# Patient Record
Sex: FEMALE | Race: WHITE | Employment: OTHER | URBAN - NONMETROPOLITAN AREA
[De-identification: names, ages, dates, MRNs, and addresses within clinical notes are randomized per-mention and may not be internally consistent; named-entity substitution may affect disease eponyms.]

---

## 2018-03-23 ENCOUNTER — HOSPITAL ENCOUNTER (OUTPATIENT)
Dept: NON INVASIVE DIAGNOSTICS | Age: 78
Discharge: HOME | End: 2018-03-23
Payer: MEDICARE

## 2018-03-23 VITALS — WEIGHT: 235.89 LBS | SYSTOLIC BLOOD PRESSURE: 130 MMHG | DIASTOLIC BLOOD PRESSURE: 69 MMHG | HEART RATE: 83 BPM

## 2018-03-23 PROCEDURE — C8928 TTE W OR W/O FOL W/CON,STRES: HCPCS

## 2018-03-23 PROCEDURE — 2580000003 HC RX 258: Performed by: INTERNAL MEDICINE

## 2018-03-23 PROCEDURE — 6360000004 HC RX CONTRAST MEDICATION: Performed by: INTERNAL MEDICINE

## 2018-03-23 PROCEDURE — 6360000002 HC RX W HCPCS: Performed by: INTERNAL MEDICINE

## 2018-03-23 RX ORDER — DOBUTAMINE HYDROCHLORIDE 200 MG/100ML
10 INJECTION INTRAVENOUS CONTINUOUS PRN
Status: DISCONTINUED | OUTPATIENT
Start: 2018-03-23 | End: 2018-03-23 | Stop reason: ALTCHOICE

## 2018-03-23 RX ORDER — SODIUM CHLORIDE 9 MG/ML
INJECTION, SOLUTION INTRAVENOUS
Status: COMPLETED | OUTPATIENT
Start: 2018-03-23 | End: 2018-03-23

## 2018-03-23 RX ADMIN — SODIUM CHLORIDE: 9 INJECTION, SOLUTION INTRAVENOUS at 10:25

## 2018-03-23 RX ADMIN — DOBUTAMINE HYDROCHLORIDE 10 MCG/KG/MIN: 200 INJECTION INTRAVENOUS at 10:25

## 2018-03-23 RX ADMIN — PERFLUTREN 1 ML: 6.52 INJECTION, SUSPENSION INTRAVENOUS at 10:20

## 2018-03-23 RX ADMIN — PERFLUTREN 0.5 ML: 6.52 INJECTION, SUSPENSION INTRAVENOUS at 10:29

## 2019-01-10 LAB
ALBUMIN SERPL-MCNC: 4 G/DL (ref 3.5–5.2)
ALP BLD-CCNC: 76 U/L (ref 35–104)
ALT SERPL-CCNC: 14 U/L (ref 5–33)
ANION GAP SERPL CALCULATED.3IONS-SCNC: 14 MMOL/L (ref 7–19)
AST SERPL-CCNC: 14 U/L (ref 5–32)
BILIRUB SERPL-MCNC: 0.3 MG/DL (ref 0.2–1.2)
BUN BLDV-MCNC: 22 MG/DL (ref 8–23)
CALCIUM SERPL-MCNC: 10.1 MG/DL (ref 8.8–10.2)
CHLORIDE BLD-SCNC: 104 MMOL/L (ref 98–111)
CO2: 29 MMOL/L (ref 22–29)
CREAT SERPL-MCNC: 2.7 MG/DL (ref 0.5–0.9)
GFR NON-AFRICAN AMERICAN: 17
GLUCOSE BLD-MCNC: 100 MG/DL (ref 74–109)
POTASSIUM SERPL-SCNC: 4.9 MMOL/L (ref 3.5–5)
SODIUM BLD-SCNC: 147 MMOL/L (ref 136–145)
TOTAL PROTEIN: 6.9 G/DL (ref 6.6–8.7)

## 2019-01-23 ENCOUNTER — NURSE TRIAGE (OUTPATIENT)
Dept: CALL CENTER | Facility: HOSPITAL | Age: 79
End: 2019-01-23

## 2019-01-23 LAB
ANION GAP SERPL CALCULATED.3IONS-SCNC: 12 MMOL/L (ref 7–19)
BUN BLDV-MCNC: 28 MG/DL (ref 8–23)
CALCIUM SERPL-MCNC: 10.2 MG/DL (ref 8.8–10.2)
CHLORIDE BLD-SCNC: 103 MMOL/L (ref 98–111)
CO2: 30 MMOL/L (ref 22–29)
CREAT SERPL-MCNC: 2 MG/DL (ref 0.5–0.9)
GFR NON-AFRICAN AMERICAN: 24
GLUCOSE BLD-MCNC: 77 MG/DL (ref 74–109)
POTASSIUM SERPL-SCNC: 4.9 MMOL/L (ref 3.5–5)
SODIUM BLD-SCNC: 145 MMOL/L (ref 136–145)

## 2019-01-23 NOTE — TELEPHONE ENCOUNTER
"Labs drawn at [facility] this am. She hit her head on the  singh of the van while getting walker out. This happen about 09:30am. A knot appeared and they put a cold coke can on it.  They have been out all day, shopping and running errands. The knot has resolved with now.  No c/o headache.  Area now with small indention. She is acting perfectly normally working a word search puzzle. No n/v.    Reason for Disposition  • Scalp swelling, bruise or pain    Additional Information  • Negative: [1] ACUTE NEURO SYMPTOM AND [2] present now  (DEFINITION: difficult to awaken OR confused thinking and talking OR slurred speech OR weakness of arms OR unsteady walking)  • Negative: Knocked out (unconscious) > 1 minute  • Negative: Seizure (convulsion) occurred  (Exception: prior history of seizures and now alert and without Acute Neuro Symptoms)  • Negative: Penetrating head injury (e.g., knife, gun shot wound, metal object)  • Negative: [1] Major bleeding (e.g., actively dripping or spurting) AND [2] can't be stopped  • Negative: [1] Dangerous mechanism of injury (e.g., MVA, diving, trampoline, contact sports, fall > 10 feet or 3 meters) AND [2] NECK pain AND [3] began < 1 hour after injury  • Negative: Sounds like a life-threatening emergency to the triager  • Negative: [1] Recently examined and diagnosed with a concussion by a healthcare provider AND [2] questions about concussion symptoms  • Negative: Can't remember what happened (amnesia)  • Negative: Vomiting once or more  • Negative: [1] Loss of vision or double vision AND [2] present now  • Negative: Watery or blood-tinged fluid dripping from the NOSE or EARS now  (Exception: tears from crying or nosebleed from nasal trauma)  • Negative: One or two \"black eyes\" (bruising, purple color of eyelids)  • Negative: [1] Large swelling AND [2] size > palm of person's hand  • Negative: Skin is split open or gaping  (or length > 1/2 inch or 12 mm)  • Negative: [1] Bleeding AND [2] won't " "stop after 10 minutes of direct pressure (using correct technique)  • Negative: Sounds like a serious injury to the triager  • Negative: [1] ACUTE NEURO SYMPTOM AND [2] now fine  (DEFINITION: difficult to awaken OR confused thinking and talking OR slurred speech OR weakness of arms OR unsteady walking)  • Negative: [1] Knocked out (unconscious) < 1 minute AND [2] now fine  • Negative: [1] SEVERE headache AND [2]  not improved 2 hours after pain medicine/ice packs  • Negative: Dangerous injury (e.g., MVA, diving, trampoline, contact sports, fall > 10 feet or 3 meters) or severe blow from hard object (e.g., golf club or baseball bat)  • Negative: Taking Coumadin (warfarin) or other strong blood thinner, or known bleeding disorder (e.g., thrombocytopenia)  • Negative: Suspicious history for the injury  • Negative: Patient is confused or is an unreliable provider of information (e.g., dementia, profound mental retardation, alcohol intoxication)  • Negative: [1] Last tetanus shot > 5 years ago AND [2] DIRTY cut or scrape  • Negative: [1] After 72 hours AND [2] headache persists    Answer Assessment - Initial Assessment Questions  1. MECHANISM: \"How did the injury happen?\" For falls, ask: \"What height did you fall from?\" and \"What surface did you fall against?\"       Van singh lowered on forehead while getting walker out of the back of the van  2. ONSET: \"When did the injury happen?\" (Minutes or hours ago)       About 0930am  3. NEUROLOGIC SYMPTOMS: \"Was there any loss of consciousness?\" \"Are there any other neurological symptoms?\"      no symptomns  4. MENTAL STATUS: \"Does the person know who he is, who you are, and where he is?\"       A/o  5. LOCATION: \"What part of the head was hit?\"       Right at the forehead  6. SCALP APPEARANCE: \"What does the scalp look like? Is it bleeding now?\" If so, ask: \"Is it difficult to stop?\"       Did not break skin   7. SIZE: For cuts, bruises, or swelling, ask: \"How large is it?\" " "(e.g., inches or centimeters)      Swelling has resolved  8. PAIN: \"Is there any pain?\" If so, ask: \"How bad is it?\"  (e.g., Scale 1-10; or mild, moderate, severe)      Denies headache  9. TETANUS: For any breaks in the skin, ask: \"When was the last tetanus booster?\"        10. OTHER SYMPTOMS: \"Do you have any other symptoms?\" (e.g., neck pain, vomiting)        none  11. PREGNANCY: \"Is there any chance you are pregnant?\" \"When was your last menstrual period?\"       na    Protocols used: HEAD INJURY-ADULT-AH      "

## 2019-05-12 ENCOUNTER — APPOINTMENT (OUTPATIENT)
Dept: GENERAL RADIOLOGY | Facility: HOSPITAL | Age: 79
End: 2019-05-12

## 2019-05-12 ENCOUNTER — APPOINTMENT (OUTPATIENT)
Dept: CT IMAGING | Facility: HOSPITAL | Age: 79
End: 2019-05-12

## 2019-05-12 ENCOUNTER — HOSPITAL ENCOUNTER (EMERGENCY)
Facility: HOSPITAL | Age: 79
Discharge: HOME | End: 2019-05-12
Admitting: EMERGENCY MEDICINE

## 2019-05-12 VITALS
DIASTOLIC BLOOD PRESSURE: 38 MMHG | RESPIRATION RATE: 17 BRPM | OXYGEN SATURATION: 94 % | SYSTOLIC BLOOD PRESSURE: 122 MMHG | BODY MASS INDEX: 37.77 KG/M2 | TEMPERATURE: 98.8 F | WEIGHT: 235 LBS | HEIGHT: 66 IN | HEART RATE: 90 BPM

## 2019-05-12 DIAGNOSIS — S42.292A CLOSED FRACTURE OF HEAD OF LEFT HUMERUS, INITIAL ENCOUNTER: Primary | ICD-10-CM

## 2019-05-12 DIAGNOSIS — K11.8 MASS OF PAROTID GLAND: ICD-10-CM

## 2019-05-12 DIAGNOSIS — N18.9 CHRONIC KIDNEY DISEASE, UNSPECIFIED CKD STAGE: ICD-10-CM
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
## 2019-05-12 DIAGNOSIS — S09.90XA INJURY OF HEAD, INITIAL ENCOUNTER: ICD-10-CM

## 2019-05-12 LAB
ALBUMIN SERPL-MCNC: 3.7 G/DL (ref 3.5–5)
ALBUMIN/GLOB SERPL: 1.3 G/DL (ref 1.1–2.5)
ALP SERPL-CCNC: 89 U/L (ref 24–120)
ALT SERPL W P-5'-P-CCNC: 21 U/L (ref 0–54)
ANION GAP SERPL CALCULATED.3IONS-SCNC: 11 MMOL/L (ref 4–13)
APTT PPP: 22 SECONDS (ref 24.1–35)
AST SERPL-CCNC: 18 U/L (ref 7–45)
BASOPHILS # BLD AUTO: 0.03 10*3/MM3 (ref 0–0.2)
BASOPHILS NFR BLD AUTO: 0.2 % (ref 0–2)
BILIRUB SERPL-MCNC: 0.5 MG/DL (ref 0.1–1)
BUN BLD-MCNC: 27 MG/DL (ref 5–21)
BUN/CREAT SERPL: 13.5 (ref 7–25)
CALCIUM SPEC-SCNC: 9.5 MG/DL (ref 8.4–10.4)
CHLORIDE SERPL-SCNC: 103 MMOL/L (ref 98–110)
CO2 SERPL-SCNC: 26 MMOL/L (ref 24–31)
CREAT BLD-MCNC: 2 MG/DL (ref 0.5–1.4)
DEPRECATED RDW RBC AUTO: 44.8 FL (ref 40–54)
EOSINOPHIL # BLD AUTO: 0.14 10*3/MM3 (ref 0–0.7)
EOSINOPHIL NFR BLD AUTO: 1.1 % (ref 0–4)
ERYTHROCYTE [DISTWIDTH] IN BLOOD BY AUTOMATED COUNT: 13.1 % (ref 12–15)
GFR SERPL CREATININE-BSD FRML MDRD: 24 ML/MIN/1.73
GLOBULIN UR ELPH-MCNC: 2.9 GM/DL
GLUCOSE BLD-MCNC: 320 MG/DL (ref 70–100)
HCT VFR BLD AUTO: 34.8 % (ref 37–47)
HGB BLD-MCNC: 11.6 G/DL (ref 12–16)
IMM GRANULOCYTES # BLD AUTO: 0.05 10*3/MM3 (ref 0–0.05)
IMM GRANULOCYTES NFR BLD AUTO: 0.4 % (ref 0–5)
INR PPP: 1 (ref 0.91–1.09)
LYMPHOCYTES # BLD AUTO: 1.92 10*3/MM3 (ref 0.72–4.86)
LYMPHOCYTES NFR BLD AUTO: 15 % (ref 15–45)
MCH RBC QN AUTO: 31.3 PG (ref 28–32)
MCHC RBC AUTO-ENTMCNC: 33.3 G/DL (ref 33–36)
MCV RBC AUTO: 93.8 FL (ref 82–98)
MONOCYTES # BLD AUTO: 0.71 10*3/MM3 (ref 0.19–1.3)
MONOCYTES NFR BLD AUTO: 5.5 % (ref 4–12)
NEUTROPHILS # BLD AUTO: 9.96 10*3/MM3 (ref 1.87–8.4)
NEUTROPHILS NFR BLD AUTO: 77.8 % (ref 39–78)
NRBC BLD AUTO-RTO: 0 /100 WBC (ref 0–0.2)
PLATELET # BLD AUTO: 322 10*3/MM3 (ref 130–400)
PMV BLD AUTO: 9.7 FL (ref 6–12)
POTASSIUM BLD-SCNC: 4.1 MMOL/L (ref 3.5–5.3)
PROT SERPL-MCNC: 6.6 G/DL (ref 6.3–8.7)
PROTHROMBIN TIME: 13.5 SECONDS (ref 11.9–14.6)
RBC # BLD AUTO: 3.71 10*6/MM3 (ref 4.2–5.4)
SODIUM BLD-SCNC: 140 MMOL/L (ref 135–145)
WBC NRBC COR # BLD: 12.81 10*3/MM3 (ref 4.8–10.8)

## 2019-05-12 PROCEDURE — 25010000002 MORPHINE SULFATE (PF) 2 MG/ML SOLUTION: Performed by: NURSE PRACTITIONER

## 2019-05-12 PROCEDURE — 25010000002 FENTANYL CITRATE (PF) 100 MCG/2ML SOLUTION: Performed by: NURSE PRACTITIONER

## 2019-05-12 PROCEDURE — 70486 CT MAXILLOFACIAL W/O DYE: CPT

## 2019-05-12 PROCEDURE — 70450 CT HEAD/BRAIN W/O DYE: CPT

## 2019-05-12 PROCEDURE — 85730 THROMBOPLASTIN TIME PARTIAL: CPT | Performed by: NURSE PRACTITIONER

## 2019-05-12 PROCEDURE — 25010000002 ONDANSETRON PER 1 MG: Performed by: NURSE PRACTITIONER

## 2019-05-12 PROCEDURE — 85610 PROTHROMBIN TIME: CPT | Performed by: NURSE PRACTITIONER

## 2019-05-12 PROCEDURE — 99285 EMERGENCY DEPT VISIT HI MDM: CPT

## 2019-05-12 PROCEDURE — 85025 COMPLETE CBC W/AUTO DIFF WBC: CPT | Performed by: NURSE PRACTITIONER

## 2019-05-12 PROCEDURE — 80053 COMPREHEN METABOLIC PANEL: CPT | Performed by: NURSE PRACTITIONER

## 2019-05-12 PROCEDURE — 96376 TX/PRO/DX INJ SAME DRUG ADON: CPT

## 2019-05-12 PROCEDURE — 96375 TX/PRO/DX INJ NEW DRUG ADDON: CPT

## 2019-05-12 PROCEDURE — 96374 THER/PROPH/DIAG INJ IV PUSH: CPT

## 2019-05-12 PROCEDURE — 72125 CT NECK SPINE W/O DYE: CPT

## 2019-05-12 PROCEDURE — 73030 X-RAY EXAM OF SHOULDER: CPT

## 2019-05-12 RX ORDER — ONDANSETRON 2 MG/ML
4 INJECTION INTRAMUSCULAR; INTRAVENOUS ONCE
Status: COMPLETED | OUTPATIENT
Start: 2019-05-12 | End: 2019-05-12

## 2019-05-12 RX ORDER — MORPHINE SULFATE 2 MG/ML
2 INJECTION, SOLUTION INTRAMUSCULAR; INTRAVENOUS ONCE
Status: DISCONTINUED | OUTPATIENT
Start: 2019-05-12 | End: 2019-05-12

## 2019-05-12 RX ORDER — MORPHINE SULFATE 2 MG/ML
2 INJECTION, SOLUTION INTRAMUSCULAR; INTRAVENOUS ONCE
Status: COMPLETED | OUTPATIENT
Start: 2019-05-12 | End: 2019-05-12

## 2019-05-12 RX ORDER — FENTANYL CITRATE 50 UG/ML
50 INJECTION, SOLUTION INTRAMUSCULAR; INTRAVENOUS ONCE
Status: COMPLETED | OUTPATIENT
Start: 2019-05-12 | End: 2019-05-12

## 2019-05-12 RX ORDER — HYDROCODONE BITARTRATE AND ACETAMINOPHEN 5; 325 MG/1; MG/1
1 TABLET ORAL EVERY 6 HOURS PRN
Qty: 12 TABLET | Refills: 0 | Status: SHIPPED | OUTPATIENT
Start: 2019-05-12 | End: 2019-05-15

## 2019-05-12 RX ADMIN — FENTANYL CITRATE 50 MCG: 50 INJECTION, SOLUTION INTRAMUSCULAR; INTRAVENOUS at 20:42

## 2019-05-12 RX ADMIN — ONDANSETRON HYDROCHLORIDE 4 MG: 2 SOLUTION INTRAMUSCULAR; INTRAVENOUS at 18:36

## 2019-05-12 RX ADMIN — ONDANSETRON HYDROCHLORIDE 4 MG: 2 SOLUTION INTRAMUSCULAR; INTRAVENOUS at 17:00

## 2019-05-12 RX ADMIN — MORPHINE SULFATE 2 MG: 2 INJECTION, SOLUTION INTRAMUSCULAR; INTRAVENOUS at 18:36

## 2019-05-12 RX ADMIN — MORPHINE SULFATE 2 MG: 2 INJECTION, SOLUTION INTRAMUSCULAR; INTRAVENOUS at 17:00

## 2019-05-12 NOTE — ED PROVIDER NOTES
Subjective   Patient is an 79-year-old  female who presents to the ER today per EMS with complaint of fall.  Patient states she was walking into her home when she tripped over a small ledge walking into her trailer.  She states that she fell face first.  The patient states she had the left side of her face on the hard floor of her house.  The patient states she also landed on her left shoulder.  Patient reports she is having facial pain and had pain at this time.  She denies any loss of consciousness.  Patient also reports left shoulder pain.  She states it is difficult for her to raise her shoulder due to the pain.  The patient denies any back pain.  She denies any neck pain at this time.  She denies any chest pain or shortness of breath.  She denies any hip or extremity pain.  She presents here today for further evaluation.        History provided by:  Patient   used: No    Fall   Mechanism of injury: fall    Injury location:  Head/neck, shoulder/arm and face  Head/neck injury location:  Head  Facial injury location:  Face  Shoulder/arm injury location:  L shoulder  Incident location:  Home  Time since incident:  1 hour  Arrived directly from scene: yes    Fall:     Fall occurred:  Standing    Impact surface:  Hard floor    Point of impact:  Head and face    Entrapped after fall: no    Suspicion of alcohol use: no    Suspicion of drug use: no    Associated symptoms: no abdominal pain, no back pain, no blindness, no chest pain, no difficulty breathing, no headaches, no hearing loss, no loss of consciousness, no nausea, no neck pain, no seizures and no vomiting    Risk factors: diabetes and kidney disease    Risk factors: no AICD, no anticoagulation therapy, no asthma, no beta blocker therapy, no CABG, no CAD, no CHF, no COPD, no dialysis, no hemophilia, no pacemaker, no past MI, not pregnant and no steroid use        Review of Systems   HENT: Negative for hearing loss.    Eyes: Negative  for blindness.   Cardiovascular: Negative for chest pain.   Gastrointestinal: Negative for abdominal pain, nausea and vomiting.   Musculoskeletal: Negative for back pain and neck pain.   Neurological: Negative for seizures, loss of consciousness and headaches.   All other systems reviewed and are negative.      No past medical history on file.    No Known Allergies    No past surgical history on file.    No family history on file.    Social History     Socioeconomic History   • Marital status:      Spouse name: Not on file   • Number of children: Not on file   • Years of education: Not on file   • Highest education level: Not on file           Objective   Physical Exam   Constitutional: She is oriented to person, place, and time. She appears well-developed and well-nourished.   HENT:   Head: Normocephalic and atraumatic.       Right Ear: External ear normal.   Left Ear: External ear normal.   Nose: Nose normal.   Mouth/Throat: Oropharynx is clear and moist.   Eyes: Conjunctivae are normal. Pupils are equal, round, and reactive to light.   Neck: Normal range of motion.       Cardiovascular: Normal rate, regular rhythm and normal heart sounds.   Pulmonary/Chest: Effort normal and breath sounds normal.   Abdominal: Soft. Bowel sounds are normal.   Musculoskeletal:        Arms:  Neurological: She is alert and oriented to person, place, and time.   Skin: Skin is warm and dry. Capillary refill takes less than 2 seconds.   Psychiatric: She has a normal mood and affect.   Nursing note and vitals reviewed.      Procedures           ED Course  ED Course as of May 12 2242   Sun May 12, 2019   1652 Xray lt shoulder reviewed; arm sling will be placed.   [LF]   1659 Pt and family updated on Xray lt shoulder. Pending labs and CT results at this time.     [LF]   2002 There was also a layering density in the left maxillary sinus, correlate for sinusitis, occult fracture also considered.  Advised the patient of this and  advised her to follow-up with ENT for this as well.  [LF]   2002 X-ray of the left shoulder showed a comminuted fracture of the head and neck of the left humerus.  The patient was placed in arm sling for this.  I have contacted orthopedics to discuss this. I did discuss the pt case with Dr. Bonilla; he would like a shoulder axillary and lateral view of the lt shoulder; if Xray shows no displacement of glenohumeral head, pt may be DC home to call and f/u with him this week or next week. Pt advised to use arm sling and to keep this on loosely; he has advised that this is a non-operable injury. Pt and family advised of this.  [LF]   2002 CT scan of the head showed no acute findings.  CT scan of the cervical spine showed no acute findings.  [LF]   2002 CT scan of the facial bones did show a left parotid mass that will need further evaluation.  Advised patient she needs to follow-up ENT for this.  [LF]   2002 There was also a layering density in the left maxillary sinus.  Findings could be due to sinusitis vs occult fracture. I have advised the pt that she will need to f/u with ENT for this as well and will refer her to Dr. Bobby.   [LF]   2218 Repeat shoulder Xray with axillary and lateral views shows alignment of glenohumeral head.   [LF]   2236 [facility] report # [id number] reviewed; no suspicious activity noted. The pt O2 sat has remained 93-94% on Ra. The pt has an arm sling placed and will be DC home in stable cond. Pt advised to call and f/u with Dr. Bonilla tomorrow; she will need to f/u either this week or next week; call tomorrow for an appt. Pt will be DC home with her daughter who will help care for her mother.   [LF]   2237 I will DC the patient home with a short course of Norco for pain.  The patient is advised to stop precautions while using.  Patient is advised to return to the ER for any new or worsening symptoms patient discharged home in stable condition at this time.  Patient denies any chest pain  shortness of breath back pain lower extremity pain or other injuries.  [LF]      ED Course User Index  [LF] Sada Ordonez, APRN      XR Shoulder 2+ View Left   Final Result   1. Limited exam.   2. Redemonstration of proximal humerus fracture with possibly 3 cm of   foreshortening, but this could be exaggerated due to technique.   3. Humeral head appears normally aligned with the glenoid fossa.   This report was finalized on 05/12/2019 21:44 by Dr Zulema Fernandez MD.      CT Head Without Contrast   Final Result   1. No acute intracranial findings.   2. Mild volume loss and mild chronic microvascular changes.   This report was finalized on 05/12/2019 19:44 by Dr Zulema Fernandez MD.      CT Facial Bones Without Contrast   Final Result   1. No evidence of acute facial fracture.   2. Layering density in the left maxillary sinus, correlate for   sinusitis. Occult fracture is also considered. No displaced maxillary   sinus wall fracture.   3. There is a 1.5 cm enhancing mass in the left parotid gland. Recommend   correlation with outside imaging to determine chronicity. Recommend ENT   follow-up and consider nonemergent/outpatient targeted ultrasound.           This report was finalized on 05/12/2019 19:44 by Dr Zulema Fernandez MD.      CT Cervical Spine Without Contrast   Final Result   1. No acute fracture or dislocation.   2. Degenerative changes with probable at least moderate spinal canal   stenosis at C3-4.   This report was finalized on 05/12/2019 19:44 by Dr Zulema Fernandez MD.      XR Shoulder 2+ View Left   Final Result   Impression:    1. Comminuted fracture head and neck of the left humerus..           This report was finalized on 05/12/2019 16:46 by Dr. Evan Tuttle MD.        Labs Reviewed   COMPREHENSIVE METABOLIC PANEL - Abnormal; Notable for the following components:       Result Value    Glucose 320 (*)     BUN 27 (*)     Creatinine 2.00 (*)     eGFR Non  Amer 24 (*)     All other components  within normal limits    Narrative:     GFR Normal >60  Chronic Kidney Disease <60  Kidney Failure <15   CBC WITH AUTO DIFFERENTIAL - Abnormal; Notable for the following components:    WBC 12.81 (*)     RBC 3.71 (*)     Hemoglobin 11.6 (*)     Hematocrit 34.8 (*)     Neutrophils, Absolute 9.96 (*)     All other components within normal limits   APTT - Abnormal; Notable for the following components:    PTT 22.0 (*)     All other components within normal limits   PROTIME-INR - Normal   CBC AND DIFFERENTIAL    Narrative:     The following orders were created for panel order CBC & Differential.  Procedure                               Abnormality         Status                     ---------                               -----------         ------                     CBC Auto Differential[[id number]]        Abnormal            Final result                 Please view results for these tests on the individual orders.                 MDM  Number of Diagnoses or Management Options  Chronic kidney disease, unspecified CKD stage: established and improving  Closed fracture of head of left humerus, initial encounter: new and requires workup  Fall, initial encounter: new and requires workup  Injury of head, initial encounter: new and requires workup  Mass of parotid gland: new and requires workup     Amount and/or Complexity of Data Reviewed  Clinical lab tests: reviewed and ordered  Tests in the radiology section of CPT®: reviewed and ordered  Discuss the patient with other providers: yes    Risk of Complications, Morbidity, and/or Mortality  General comments: Pt case discussed with Dr. Bautista who agrees with plan of care.     Patient Progress  Patient progress: stable        Final diagnoses:   Closed fracture of head of left humerus, initial encounter   Mass of parotid gland   Chronic kidney disease, unspecified CKD stage   Fall, initial encounter   Injury of head, initial encounter            Sada Ordonez, APRN  05/12/19  [id number]

## 2019-06-02 NOTE — PROGRESS NOTES
Jesse Duffy MD     Chief Complaint   Patient presents with   • Neck Mass        History of Present Illness   Gema Chapman is a  79 y.o.  female who presents for evaluation of an abnormal radiographic study. She had a recent CT  of the facial bones that showed a 1.5 cm enhancing lesion of the left parotid gland.. She has had complaints regarding this findings.  She has not had a palpable mass, facial nerve symptoms, dryness, or lymphadenopathy.  If the CT scan was not performed, she would not know that she had a lesion.    Review of Systems  Reviewed per patient intake note    Past History:  Past Medical History:   Diagnosis Date   • Diabetes (CMS/HCC)    • Hyperlipemia    • Hypertension    • Renal disease      Past Surgical History:   Procedure Laterality Date   • CHOLECYSTECTOMY     • HIP ARTHROPLASTY Left      Family History   Problem Relation Age of Onset   • Diabetes Mother      Social History     Tobacco Use   • Smoking status: Never Smoker   • Smokeless tobacco: Never Used   Substance Use Topics   • Alcohol use: No     Frequency: Never   • Drug use: Not on file     Outpatient Medications Marked as Taking for the 6/5/19 encounter (Office Visit) with Jesse Duffy MD   Medication Sig Dispense Refill   • acetaminophen (TYLENOL) 500 MG tablet Take 500 mg by mouth Every 6 (Six) Hours As Needed for Mild Pain .     • amLODIPine-benazepril (LOTREL 5-10) 5-10 MG per capsule      • calcitriol (ROCALTROL) 0.25 MCG capsule      • gabapentin (NEURONTIN) 100 MG capsule      • HYDROcodone-acetaminophen (NORCO) 5-325 MG per tablet      • LANTUS SOLOSTAR 100 UNIT/ML injection pen      • magnesium chloride ER 64 MG DR tablet Take  by mouth Daily.     • NOVOLOG FLEXPEN 100 UNIT/ML solution pen-injector sc pen      • Omega-3 Fatty Acids (FISH OIL) 1000 MG capsule capsule Take  by mouth Daily With Breakfast.     • omeprazole (priLOSEC) 20 MG capsule      • rosuvastatin (CRESTOR) 20 MG tablet      • Semaglutide  (OZEMPIC) 0.25 or 0.5 MG/DOSE solution pen-injector Inject 0.5 mg under the skin into the appropriate area as directed 1 (One) Time Per Week.     • torsemide (DEMADEX) 20 MG tablet      • ULORIC 40 MG tablet      • Vitamin D, Cholecalciferol, (CHOLECALCIFEROL) 400 units tablet Take 2,000 Units by mouth Daily.       Allergies:  Patient has no known allergies.        Vital Signs:   Temp:  [97.3 °F (36.3 °C)] 97.3 °F (36.3 °C)  Heart Rate:  [61] 61  BP: (131)/(88) 131/88    Physical Exam:   CONSTITUTIONAL: well nourished, well-developed, alert, oriented, in no acute distress   COMMUNICATION AND VOICE: able to communicate normally, normal voice quality  HEAD: normocephalic, no lesions, atraumatic, no tenderness, no masses   FACE: appearance normal, no lesions, no tenderness, no deformities, facial motion symmetric  SALIVARY GLANDS: On the left side, I may be able to palpate a fullness in the parotid gland but no discrete mass or lesion.  EYES: ocular motility normal, eyelids normal, orbits normal, no proptosis, conjunctiva normal , pupils equal, round  HEARING: response to conversational voice normal bilaterally   EXTERNAL EARS: auricles without lesions  EXTERNAL EAR CANALS: normal ear canals without stenosis or significant cerumen  TYMPANIC MEMBRANES: tympanic membrane appearance normal, no lesions, no perforation, normal mobility, no fluid  EXTERNAL NOSE: structure normal, no tenderness on palpation, no nasal discharge, no lesions, no evidence of trauma, nostrils patent  INTRANASAL EXAM: nasal mucosa normal, vestibule within normal limits, inferior turbinate normal, nasal septum without overtly obstructing anterior deviation  LIPS: structure normal, no tenderness on palpation, no lesions, no evidence of trauma  TEETH: dentition within normal limits for age  GUMS: gingivae healthy  ORAL MUCOSA: oral mucosa normal, no mucosal lesions  FLOOR OF MOUTH: Warthin's duct patent, mucosa normal  TONGUE: lingual mucosa normal  without lesions, normal tongue mobility  PALATE: soft and hard palates with normal mucosa and structure  OROPHARYNX: oropharyngeal mucosa normal, tonsil fossa with normal in appearance  NECK: neck appearance normal, no masses or tenderness  THYROID: no overt thyromegaly, no tenderness, nodules or mass present on palpation, position midline   LYMPH NODES: no lymphadenopathy  CHEST/RESPIRATORY: respiratory effort normal  CARDIOVASCULAR: extremities without cyanosis or edema, no overt jugulovenous distension present  NEUROLOGIC/PSYCHIATRIC: oriented appropriately for age, mood normal, affect appropriate, cranial nerves intact grossly unless specifically mentioned above     RESULTS REVIEW:    CT FACE:  I have personally reviewed the patient's ct scan of the facial bones images.       Assessment   1. 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  After the long discussion, the patient wanted to observe this area and will consider needle biopsy on further discussion with her family members.    Orders Placed This Encounter   Procedures   • US Head Neck Soft Tissue       Return in about 4 months (around 10/5/2019).    Jesse Duffy MD  06/05/19  2:32 PM

## 2019-06-05 ENCOUNTER — OFFICE VISIT (OUTPATIENT)
Dept: OTOLARYNGOLOGY | Facility: CLINIC | Age: 79
End: 2019-06-05

## 2019-06-05 VITALS
WEIGHT: 212 LBS | BODY MASS INDEX: 34.07 KG/M2 | HEIGHT: 66 IN | HEART RATE: 61 BPM | SYSTOLIC BLOOD PRESSURE: 131 MMHG | TEMPERATURE: 97.3 F | DIASTOLIC BLOOD PRESSURE: 88 MMHG

## 2019-06-05 DIAGNOSIS — D37.030 NEOPLASM OF UNCERTAIN BEHAVIOR OF PAROTID GLAND: Primary | ICD-10-CM

## 2019-06-05 PROCEDURE — 99203 OFFICE O/P NEW LOW 30 MIN: CPT | Performed by: OTOLARYNGOLOGY

## 2019-06-05 RX ORDER — HYDROCODONE BITARTRATE AND ACETAMINOPHEN 5; 325 MG/1; MG/1
TABLET ORAL
COMMUNITY
Start: 2019-05-17 | End: 2019-10-23

## 2019-06-05 RX ORDER — MULTIVIT-MIN/IRON/FOLIC ACID/K 18-600-40
2000 CAPSULE ORAL DAILY
COMMUNITY

## 2019-06-05 RX ORDER — INSULIN GLARGINE 100 [IU]/ML
61 INJECTION, SOLUTION SUBCUTANEOUS 2 TIMES DAILY
Status: ON HOLD | COMMUNITY
Start: 2019-05-31 | End: 2020-04-21 | Stop reason: SDUPTHER

## 2019-06-05 RX ORDER — AMLODIPINE BESYLATE AND BENAZEPRIL HYDROCHLORIDE 5; 10 MG/1; MG/1
1 CAPSULE ORAL DAILY
COMMUNITY
Start: 2019-04-18 | End: 2020-04-21 | Stop reason: HOSPADM

## 2019-06-05 RX ORDER — ROSUVASTATIN CALCIUM 20 MG/1
20 TABLET, COATED ORAL DAILY
COMMUNITY
Start: 2019-04-18

## 2019-06-05 RX ORDER — MAGNESIUM CHLORIDE 64 MG
64 TABLET, DELAYED RELEASE (ENTERIC COATED) ORAL DAILY
COMMUNITY

## 2019-06-05 RX ORDER — INSULIN ASPART 100 [IU]/ML
13 INJECTION, SOLUTION INTRAVENOUS; SUBCUTANEOUS
COMMUNITY
Start: 2019-05-31

## 2019-06-05 RX ORDER — GABAPENTIN 100 MG/1
100 CAPSULE ORAL DAILY
COMMUNITY
Start: 2019-04-18

## 2019-06-05 RX ORDER — FEBUXOSTAT 40 MG/1
TABLET ORAL
Status: ON HOLD | COMMUNITY
Start: 2019-05-20 | End: 2020-04-08

## 2019-06-05 RX ORDER — CALCITRIOL 0.25 UG/1
0.25 CAPSULE, LIQUID FILLED ORAL DAILY
COMMUNITY
Start: 2019-05-20

## 2019-06-05 RX ORDER — CHLORAL HYDRATE 500 MG
1000 CAPSULE ORAL
COMMUNITY

## 2019-06-05 RX ORDER — ACETAMINOPHEN 500 MG
1000 TABLET ORAL DAILY
COMMUNITY

## 2019-06-05 RX ORDER — OMEPRAZOLE 20 MG/1
CAPSULE, DELAYED RELEASE ORAL
COMMUNITY
Start: 2019-04-18 | End: 2019-10-23

## 2019-06-05 RX ORDER — TORSEMIDE 20 MG/1
20 TABLET ORAL TAKE AS DIRECTED
COMMUNITY
Start: 2019-03-05 | End: 2020-04-21 | Stop reason: HOSPADM

## 2019-06-05 NOTE — PROGRESS NOTES
Estella Osullivan   Patient Intake Note    Review of Systems  Review of Systems   Constitutional: Negative for chills, fatigue and fever.   HENT:        See HPI   Eyes: Negative for pain, redness and itching.   Respiratory: Negative for cough, choking and shortness of breath.    Gastrointestinal: Negative for diarrhea, nausea and vomiting.   Musculoskeletal: Negative for neck pain and neck stiffness.   Neurological: Negative for dizziness, light-headedness and headaches.   Psychiatric/Behavioral: Negative for sleep disturbance.   All other systems reviewed and are negative.      QUALITY MEASURES    Tobacco Use: Screening and Cessation Intervention  Social History    Tobacco Use      Smoking status: Never Smoker      Smokeless tobacco: Never Used        Estella Osullivan  6/5/2019  1:19 PM

## 2019-10-15 ENCOUNTER — HOSPITAL ENCOUNTER (OUTPATIENT)
Dept: ULTRASOUND IMAGING | Facility: HOSPITAL | Age: 79
Discharge: HOME | End: 2019-10-15
Admitting: OTOLARYNGOLOGY

## 2019-10-15 DIAGNOSIS — D37.030 NEOPLASM OF UNCERTAIN BEHAVIOR OF PAROTID GLAND: ICD-10-CM

## 2019-10-15 PROCEDURE — 76536 US EXAM OF HEAD AND NECK: CPT

## 2019-10-23 ENCOUNTER — OFFICE VISIT (OUTPATIENT)
Dept: OTOLARYNGOLOGY | Facility: CLINIC | Age: 79
End: 2019-10-23

## 2019-10-23 VITALS
WEIGHT: 210.4 LBS | HEIGHT: 66 IN | BODY MASS INDEX: 33.82 KG/M2 | SYSTOLIC BLOOD PRESSURE: 119 MMHG | TEMPERATURE: 98.7 F | DIASTOLIC BLOOD PRESSURE: 70 MMHG | HEART RATE: 110 BPM

## 2019-10-23 DIAGNOSIS — D37.030 NEOPLASM OF UNCERTAIN BEHAVIOR OF PAROTID GLAND: Primary | ICD-10-CM

## 2019-10-23 PROCEDURE — 99213 OFFICE O/P EST LOW 20 MIN: CPT | Performed by: OTOLARYNGOLOGY

## 2019-10-23 RX ORDER — FAMOTIDINE 20 MG/1
20 TABLET, FILM COATED ORAL DAILY
COMMUNITY

## 2019-10-23 NOTE — PROGRESS NOTES
Lauren Tamayo MA   Patient Intake Note    Review of Systems  Review of Systems   Constitutional: Negative for chills, fatigue and fever.   HENT:        See HPI   Respiratory: Negative for cough, shortness of breath and wheezing.    Gastrointestinal: Negative for diarrhea, nausea and vomiting.   Neurological: Negative for light-headedness and headaches.   Psychiatric/Behavioral: Negative for sleep disturbance.       QUALITY MEASURES    Tobacco Use: Screening and Cessation Intervention  Social History    Tobacco Use      Smoking status: Never Smoker      Smokeless tobacco: Never Used        Lauren Tamayo MA  10/23/2019  10:46 AM

## 2019-10-23 NOTE — PROGRESS NOTES
Jesse Duffy MD     Chief Complaint   Patient presents with   • Follow-up        History of Present Illness  Gema Chapman is a  79 y.o. female who is here for follow up.  She was last seen in the office on June 5, 2019 for evaluation of a left-sided parotid gland mass.  At the time of the visit, I gave her options of serial ultrasounds versus CT scans versus fine-needle aspiration to get a pathologic diagnosis versus parotidectomy.  She deferred her decision until after discussion with family members.  She eventually did decide to get a ultrasound of the parotid gland which was performed on October 15, 2019.  This showed subcentimeter left parotid gland cysts but no definitive sonographic evidence of the left parotid gland mass.      Review of Systems  Reviewed per patient intake note    Past History:  Past medical and surgical history, family history and social history reviewed and updated when appropriate.  Current medications and allergies reviewed and updated when appropriate.  Allergies:  Patient has no known allergies.        Vital Signs:   Temp:  [98.7 °F (37.1 °C)] 98.7 °F (37.1 °C)  Heart Rate:  [110] 110  BP: (119)/(70) 119/70    Physical Exam:  CONSTITUTIONAL: well nourished, well-developed, alert, oriented, in no acute distress   COMMUNICATION AND VOICE: able to communicate normally, normal voice quality  HEAD: normocephalic, no lesions, atraumatic, no tenderness, no masses   FACE: appearance normal, no lesions, no tenderness, no deformities, facial motion symmetric  SALIVARY GLANDS: parotid glands with no tenderness, no swelling, no masses, submandibular glands with normal size, nontender  EYES: ocular motility normal, eyelids normal, orbits normal, no proptosis, conjunctiva normal , pupils equal, round  HEARING: response to conversational voice normal bilaterally   EXTERNAL EARS: auricles without lesions  EXTERNAL NOSE: structure normal, no tenderness on palpation, no nasal discharge, no  lesions, no evidence of trauma, nostrils patent  LIPS: structure normal, no tenderness on palpation, no lesions, no evidence of trauma  NECK: neck appearance normal  LYMPH NODES: no lymphadenopathy  CHEST/RESPIRATORY: respiratory effort normal  CARDIOVASCULAR: extremities without cyanosis or edema, no overt jugulovenous distension present  NEUROLOGIC/PSYCHIATRIC: oriented appropriately for age, mood normal, affect appropriate, cranial nerves intact grossly unless specifically mentioned above          Assessment   1. 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                 Return if symptoms worsen or fail to improve.    Jesse Duffy MD  10/23/19  11:45 AM

## 2020-04-04 ENCOUNTER — HOSPITAL ENCOUNTER (INPATIENT)
Facility: HOSPITAL | Age: 80
LOS: 17 days | Discharge: HOME | End: 2020-04-21
Attending: EMERGENCY MEDICINE | Admitting: INTERNAL MEDICINE

## 2020-04-04 ENCOUNTER — APPOINTMENT (OUTPATIENT)
Dept: GENERAL RADIOLOGY | Facility: HOSPITAL | Age: 80
End: 2020-04-04

## 2020-04-04 DIAGNOSIS — Z74.09 IMPAIRED MOBILITY AND ADLS: ICD-10-CM

## 2020-04-04 DIAGNOSIS — R74.01 TRANSAMINITIS: ICD-10-CM

## 2020-04-04 DIAGNOSIS — U07.1 COVID-19 VIRUS INFECTION: ICD-10-CM

## 2020-04-04 DIAGNOSIS — J18.9 PNEUMONIA DUE TO INFECTIOUS ORGANISM, UNSPECIFIED LATERALITY, UNSPECIFIED PART OF LUNG: ICD-10-CM

## 2020-04-04 DIAGNOSIS — R50.9 FEBRILE ILLNESS: Primary | ICD-10-CM

## 2020-04-04 DIAGNOSIS — Z74.09 IMPAIRED MOBILITY: ICD-10-CM

## 2020-04-04 DIAGNOSIS — N18.9 CHRONIC KIDNEY DISEASE, UNSPECIFIED CKD STAGE: ICD-10-CM

## 2020-04-04 DIAGNOSIS — Z78.9 IMPAIRED MOBILITY AND ADLS: ICD-10-CM

## 2020-04-04 PROBLEM — E11.9 TYPE 2 DIABETES MELLITUS (HCC): Status: ACTIVE | Noted: 2020-04-04

## 2020-04-04 LAB
ALBUMIN SERPL-MCNC: 3.2 G/DL (ref 3.5–5.2)
ALBUMIN/GLOB SERPL: 0.8 G/DL
ALP SERPL-CCNC: 92 U/L (ref 39–117)
ALT SERPL W P-5'-P-CCNC: 183 U/L (ref 1–33)
ANION GAP SERPL CALCULATED.3IONS-SCNC: 15 MMOL/L (ref 5–15)
AST SERPL-CCNC: 212 U/L (ref 1–32)
BASOPHILS # BLD AUTO: 0.03 10*3/MM3 (ref 0–0.2)
BASOPHILS NFR BLD AUTO: 0.2 % (ref 0–1.5)
BILIRUB SERPL-MCNC: 0.7 MG/DL (ref 0.2–1.2)
BUN BLD-MCNC: 51 MG/DL (ref 8–23)
BUN/CREAT SERPL: 22.6 (ref 7–25)
CALCIUM SPEC-SCNC: 9.2 MG/DL (ref 8.6–10.5)
CHLORIDE SERPL-SCNC: 96 MMOL/L (ref 98–107)
CO2 SERPL-SCNC: 21 MMOL/L (ref 22–29)
CREAT BLD-MCNC: 2.26 MG/DL (ref 0.57–1)
CRP SERPL-MCNC: 14.96 MG/DL (ref 0–0.5)
D-LACTATE SERPL-SCNC: 1.9 MMOL/L (ref 0.5–2)
DEPRECATED RDW RBC AUTO: 45.1 FL (ref 37–54)
EOSINOPHIL # BLD AUTO: 0 10*3/MM3 (ref 0–0.4)
EOSINOPHIL NFR BLD AUTO: 0 % (ref 0.3–6.2)
ERYTHROCYTE [DISTWIDTH] IN BLOOD BY AUTOMATED COUNT: 13.2 % (ref 12.3–15.4)
FERRITIN SERPL-MCNC: 1835 NG/ML (ref 13–150)
GFR SERPL CREATININE-BSD FRML MDRD: 21 ML/MIN/1.73
GLOBULIN UR ELPH-MCNC: 4.1 GM/DL
GLUCOSE BLD-MCNC: 226 MG/DL (ref 65–99)
GLUCOSE BLDC GLUCOMTR-MCNC: 197 MG/DL (ref 70–130)
GLUCOSE BLDC GLUCOMTR-MCNC: 219 MG/DL (ref 70–130)
HCT VFR BLD AUTO: 34.1 % (ref 34–46.6)
HGB BLD-MCNC: 11.5 G/DL (ref 12–15.9)
IMM GRANULOCYTES # BLD AUTO: 0.08 10*3/MM3 (ref 0–0.05)
IMM GRANULOCYTES NFR BLD AUTO: 0.5 % (ref 0–0.5)
LDH SERPL-CCNC: 423 U/L (ref 135–214)
LYMPHOCYTES # BLD AUTO: 1.15 10*3/MM3 (ref 0.7–3.1)
LYMPHOCYTES NFR BLD AUTO: 7.3 % (ref 19.6–45.3)
MCH RBC QN AUTO: 31.4 PG (ref 26.6–33)
MCHC RBC AUTO-ENTMCNC: 33.7 G/DL (ref 31.5–35.7)
MCV RBC AUTO: 93.2 FL (ref 79–97)
MONOCYTES # BLD AUTO: 0.64 10*3/MM3 (ref 0.1–0.9)
MONOCYTES NFR BLD AUTO: 4 % (ref 5–12)
NEUTROPHILS # BLD AUTO: 13.92 10*3/MM3 (ref 1.7–7)
NEUTROPHILS NFR BLD AUTO: 88 % (ref 42.7–76)
NRBC BLD AUTO-RTO: 0 /100 WBC (ref 0–0.2)
PLATELET # BLD AUTO: 297 10*3/MM3 (ref 140–450)
PMV BLD AUTO: 10.3 FL (ref 6–12)
POTASSIUM BLD-SCNC: 4.3 MMOL/L (ref 3.5–5.2)
PROCALCITONIN SERPL-MCNC: 0.69 NG/ML (ref 0.1–0.25)
PROT SERPL-MCNC: 7.3 G/DL (ref 6–8.5)
RBC # BLD AUTO: 3.66 10*6/MM3 (ref 3.77–5.28)
SODIUM BLD-SCNC: 132 MMOL/L (ref 136–145)
WBC NRBC COR # BLD: 15.82 10*3/MM3 (ref 3.4–10.8)

## 2020-04-04 PROCEDURE — 25010000002 ENOXAPARIN PER 10 MG: Performed by: FAMILY MEDICINE

## 2020-04-04 PROCEDURE — 84145 PROCALCITONIN (PCT): CPT | Performed by: EMERGENCY MEDICINE

## 2020-04-04 PROCEDURE — 86140 C-REACTIVE PROTEIN: CPT | Performed by: EMERGENCY MEDICINE

## 2020-04-04 PROCEDURE — 94799 UNLISTED PULMONARY SVC/PX: CPT

## 2020-04-04 PROCEDURE — 83615 LACTATE (LD) (LDH) ENZYME: CPT | Performed by: EMERGENCY MEDICINE

## 2020-04-04 PROCEDURE — 71045 X-RAY EXAM CHEST 1 VIEW: CPT

## 2020-04-04 PROCEDURE — 83605 ASSAY OF LACTIC ACID: CPT | Performed by: EMERGENCY MEDICINE

## 2020-04-04 PROCEDURE — U0002 COVID-19 LAB TEST NON-CDC: HCPCS | Performed by: EMERGENCY MEDICINE

## 2020-04-04 PROCEDURE — 80053 COMPREHEN METABOLIC PANEL: CPT | Performed by: EMERGENCY MEDICINE

## 2020-04-04 PROCEDURE — 36415 COLL VENOUS BLD VENIPUNCTURE: CPT

## 2020-04-04 PROCEDURE — 25010000002 AZITHROMYCIN PER 500 MG: Performed by: EMERGENCY MEDICINE

## 2020-04-04 PROCEDURE — 82728 ASSAY OF FERRITIN: CPT | Performed by: EMERGENCY MEDICINE

## 2020-04-04 PROCEDURE — 93005 ELECTROCARDIOGRAM TRACING: CPT | Performed by: EMERGENCY MEDICINE

## 2020-04-04 PROCEDURE — 93010 ELECTROCARDIOGRAM REPORT: CPT | Performed by: INTERNAL MEDICINE

## 2020-04-04 PROCEDURE — 87040 BLOOD CULTURE FOR BACTERIA: CPT | Performed by: EMERGENCY MEDICINE

## 2020-04-04 PROCEDURE — 63710000001 INSULIN LISPRO (HUMAN) PER 5 UNITS: Performed by: FAMILY MEDICINE

## 2020-04-04 PROCEDURE — 82962 GLUCOSE BLOOD TEST: CPT

## 2020-04-04 PROCEDURE — 85025 COMPLETE CBC W/AUTO DIFF WBC: CPT | Performed by: EMERGENCY MEDICINE

## 2020-04-04 PROCEDURE — 99285 EMERGENCY DEPT VISIT HI MDM: CPT

## 2020-04-04 PROCEDURE — 25010000002 CEFTRIAXONE PER 250 MG: Performed by: EMERGENCY MEDICINE

## 2020-04-04 RX ORDER — SODIUM CHLORIDE 0.9 % (FLUSH) 0.9 %
10 SYRINGE (ML) INJECTION EVERY 12 HOURS SCHEDULED
Status: DISCONTINUED | OUTPATIENT
Start: 2020-04-04 | End: 2020-04-20

## 2020-04-04 RX ORDER — DEXTROSE MONOHYDRATE 25 G/50ML
25 INJECTION, SOLUTION INTRAVENOUS
Status: DISCONTINUED | OUTPATIENT
Start: 2020-04-04 | End: 2020-04-21 | Stop reason: HOSPADM

## 2020-04-04 RX ORDER — ONDANSETRON 4 MG/1
4 TABLET, FILM COATED ORAL EVERY 6 HOURS PRN
Status: DISCONTINUED | OUTPATIENT
Start: 2020-04-04 | End: 2020-04-21 | Stop reason: HOSPADM

## 2020-04-04 RX ORDER — ACETAMINOPHEN 325 MG/1
650 TABLET ORAL EVERY 4 HOURS PRN
Status: DISCONTINUED | OUTPATIENT
Start: 2020-04-04 | End: 2020-04-05

## 2020-04-04 RX ORDER — NICOTINE POLACRILEX 4 MG
15 LOZENGE BUCCAL
Status: DISCONTINUED | OUTPATIENT
Start: 2020-04-04 | End: 2020-04-21 | Stop reason: HOSPADM

## 2020-04-04 RX ORDER — ACETAMINOPHEN 160 MG/5ML
650 SOLUTION ORAL EVERY 4 HOURS PRN
Status: DISCONTINUED | OUTPATIENT
Start: 2020-04-04 | End: 2020-04-05

## 2020-04-04 RX ORDER — HYDROXYCHLOROQUINE SULFATE 200 MG/1
400 TABLET, FILM COATED ORAL EVERY 12 HOURS SCHEDULED
Status: DISPENSED | OUTPATIENT
Start: 2020-04-04 | End: 2020-04-05

## 2020-04-04 RX ORDER — ACETAMINOPHEN 500 MG
1000 TABLET ORAL ONCE
Status: COMPLETED | OUTPATIENT
Start: 2020-04-04 | End: 2020-04-04

## 2020-04-04 RX ORDER — HYDROXYCHLOROQUINE SULFATE 200 MG/1
200 TABLET, FILM COATED ORAL EVERY 12 HOURS SCHEDULED
Status: DISCONTINUED | OUTPATIENT
Start: 2020-04-05 | End: 2020-04-06

## 2020-04-04 RX ORDER — SODIUM CHLORIDE 0.9 % (FLUSH) 0.9 %
10 SYRINGE (ML) INJECTION AS NEEDED
Status: DISCONTINUED | OUTPATIENT
Start: 2020-04-04 | End: 2020-04-21 | Stop reason: HOSPADM

## 2020-04-04 RX ORDER — ACETAMINOPHEN 650 MG/1
650 SUPPOSITORY RECTAL EVERY 4 HOURS PRN
Status: DISCONTINUED | OUTPATIENT
Start: 2020-04-04 | End: 2020-04-05

## 2020-04-04 RX ADMIN — INSULIN LISPRO 3 UNITS: 100 INJECTION, SOLUTION INTRAVENOUS; SUBCUTANEOUS at 17:34

## 2020-04-04 RX ADMIN — ACETAMINOPHEN 650 MG: 325 TABLET, FILM COATED ORAL at 22:17

## 2020-04-04 RX ADMIN — ACETAMINOPHEN 650 MG: 325 TABLET, FILM COATED ORAL at 17:34

## 2020-04-04 RX ADMIN — ENOXAPARIN SODIUM 30 MG: 30 INJECTION SUBCUTANEOUS at 17:34

## 2020-04-04 RX ADMIN — SODIUM CHLORIDE, PRESERVATIVE FREE 10 ML: 5 INJECTION INTRAVENOUS at 20:11

## 2020-04-04 RX ADMIN — ACETAMINOPHEN 1000 MG: 500 TABLET, FILM COATED ORAL at 11:33

## 2020-04-04 RX ADMIN — AZITHROMYCIN MONOHYDRATE 500 MG: 500 INJECTION, POWDER, LYOPHILIZED, FOR SOLUTION INTRAVENOUS at 13:00

## 2020-04-04 RX ADMIN — CEFTRIAXONE SODIUM 1 G: 1 INJECTION, POWDER, FOR SOLUTION INTRAMUSCULAR; INTRAVENOUS at 11:53

## 2020-04-04 RX ADMIN — INSULIN LISPRO 2 UNITS: 100 INJECTION, SOLUTION INTRAVENOUS; SUBCUTANEOUS at 20:18

## 2020-04-04 NOTE — CONSULTS
INFECTIOUS DISEASES CONSULT NOTE    Patient:  Gema Chapman 80 y.o. female  ROOM # 290/1  YOB: 1940  MRN: [id number]  [facility]:  [id number]  Admit date: 4/4/2020   Admitting Physician: Paulette Bell MD  Primary Care Physician: Duy Hall MD  REFERRING PROVIDER: Paulette Bell MD    Reason for Consultation: Tested positive for COVID-19.    History of Present Illness/Chief Complaint: Pleasant 80-year-old woman.  Seen and examined in the emergency room.  She indicates she started to get sick about a week ago.  She felt weak and tired.  She indicates development of cough particularly when she tries to talk.  She reports some dyspnea.  She feels she is gotten more tired over the past few days.  She feels her cough is somewhat worse.  She indicates she went through a drive-through testing center near the end of this week.  She indicates her test for COVID-19 was positive.  She has a history of chronic renal disease.  She also reports history of diabetes mellitus.  Due to ongoing fever at home and report of progressive congestion, she is referred by her physician to the emergency room to consider admission for further management.    Current Scheduled Medications:     azithromycin 500 mg Intravenous Once     Outpatient medications  Acetaminophen as needed  Amlodipine 5 to 10 mg daily  Rocaltrol 0.25 mcg daily  Pepcid 20 mg orally daily  Neurontin-dosing uncertain  Lantus-dosing uncertain  Novel log-dosing uncertain  Omega-3 fatty acids  Crestor 20 mg daily  Ozempic  Demadex    Current PRN Medications: None    Allergies:  No Known Allergies     Past Medical History: Chronic kidney disease.  Hyperlipidemia.  Diabetes mellitus.  Hypertension.    Past Surgical History: Cholecystectomy.  Hip arthroplasty.    Social History: .  No tobacco or alcohol use.  Indicates she lives with her daughter.    Family History: Diabetes.    Exposure History: No close contacts have been ill.    Review of  "Systems   Denies headache or visual changes  Denies chest pain or chest pressure  Reports no nausea, vomiting, or diarrhea  No abdominal pain or discomfort  No recent weight gain or weight loss  No dysuria or urinary frequency  She has not noticed any rash  No sore throat  No sinus symptoms  Please see HPI for remaining pertinent positive negatives from her complete review of systems    Vital Signs:  /62   Pulse 87   Temp 99.5 °F (37.5 °C) (Oral)   Resp 24   Ht 167.6 cm (66\")   Wt 95 kg (209 lb 8 oz)   SpO2 92%   BMI 33.81 kg/m²  Temp (24hrs), Av.5 °F (37.5 °C), Min:99.5 °F (37.5 °C), Max:99.5 °F (37.5 °C)    Physical Exam  Vital signs - reviewed.  She looks tired.  She was wearing a mask.  She coughed at times when trying to speak.  She looks very weak.  She was not very mobile in bed.  She has slight increase in respiratory rate at around 18.  Although in the room with PPE on speaking with her and observing her at 6 feet of distance, did not repeat physical exam performed by the emergency room physician.  Reviewed ER attending's physical exam and description.    Lab Results:  CBC: Results from last 7 days   Lab Units 20  1105   WBC 10*3/mm3 15.82*   HEMOGLOBIN g/dL 11.5*   HEMATOCRIT % 34.1   PLATELETS 10*3/mm3 297     Lymphocyte count 1150    CMP: Results from last 7 days   Lab Units 20  1105   SODIUM mmol/L 132*   POTASSIUM mmol/L 4.3   CHLORIDE mmol/L 96*   CO2 mmol/L 21.0*   BUN mg/dL 51*   CREATININE mg/dL 2.26*   CALCIUM mg/dL 9.2   BILIRUBIN mg/dL 0.7   ALK PHOS U/L 92   ALT (SGPT) U/L 183*   AST (SGOT) U/L 212*   GLUCOSE mg/dL 226*     Ferritin 1835  C-reactive protein 14.9  Procalcitonin 0.69      Radiology:   Chest x-ray today (personally reviewed):  FINDINGS:  The level inspiration is shallow and lung volumes diminished.  There is mild airspace opacities in the right lung base, atelectasis  associated with the level inspiration considered. Underlying infiltrate  from " infectious or inflammatory process not excluded. Correlate with  patient presentation.  The left lung is grossly clear.  The heart is likely within the upper limits of normal in size with  ectasia aorta. The pulmonary circulation appropriate, without heart  failure.  Small sclerotic lesion noted in the right humeral neck, enchondroma  considered.  Degenerative spurring noted in the thoracic spine.                         IMPRESSION:  1. Shallow inspiration with diminished lung volumes. Mild right basilar  atelectasis suspected.  This report was finalized on 04/04/2020 11:13 by Dr. Bola Pratt MD.    Additional Studies Reviewed:     Impression:   1.  Fever, fatigue, cough, elevated AST/ALT and reported positive COVID-19 testing.  Hard copy of test not available for review at present.  Per history appears to have COVID-19 infection.  She appears weak.  She appears tired.  She is on room air oxygen currently.  She has had approximately 1 week of symptoms at this point.  She looks weak enough that managing at home would be difficult.  Admission for assistance with care reasonable and it shows evidence of decline we could consider instituting additional treatment possibly with hydroxychloroquine.  At present feel she needs primarily supportive care.  She show some signs of improvement relatively soon she could likely be discharged home to manage as an outpatient.  2.  Chronic renal failure.  Current creatinine 2.3.  Per review of her chart her creatinine in May 2019 was 2.0.  3.  Diabetes mellitus  4.  History hypertension    Recommendations:    She is being admitted to the general medical COVID-19 wells  Would suggest contact/enhanced droplet precautions  We continue supportive care with gentle IV volume resuscitation  Do not feel she needs any antimicrobial treatment  Encourage incentive spirometry/deep breathing  If she has some difficulty with worsening dyspnea, she could try prone positioning  Supplemental oxygen  as needed    Given her risk factors for poor outcome going to add hydroxychloroquine treatment and follow QTc  Will continue to follow  Hopefully she will gain some strength and could be discharged home soon for additional convalescence as outpatient    Drew Castaneda MD  04/04/20  12:52

## 2020-04-04 NOTE — PLAN OF CARE
Patient denies pain/nausea however reports a current lack of appetite. HR is intermittently tachy. Ambulated to the bathroom. Made it back to bed well however was too weak to be able to assist herself up in the bed. Patients O2 decreased and patient required 3L NC to increase SaO2. [facility] called wanting QTc to be monitored q shift and does not want the plaquenil given if that level is greater than 440. Patients was [id number]. Awaiting callback from [facility] to report that value to him, as he stated he would call me back. Accu ac/hs w/ s/s coverage. Will cont to monitor.     Problem: Patient Care Overview  Goal: Plan of Care Review  Outcome: Ongoing (interventions implemented as appropriate)  Flowsheets (Taken 4/4/2020 1842)  Progress: no change  Plan of Care Reviewed With: patient

## 2020-04-04 NOTE — ED PROVIDER NOTES
Subjective   This patient was sent by Dr. Riley to the ER to be evaluated and admitted for covid although she already has been tested positive and was getting progressively worsening fever and congestion at home      Fever   Max temp prior to arrival:  Fever  Temp source:  Oral  Severity:  Moderate  Onset quality:  Gradual  Timing:  Constant  Progression:  Worsening  Chronicity:  New  Relieved by:  Nothing  Worsened by:  Nothing  Ineffective treatments:  None tried  Associated symptoms: chills, congestion and cough    Associated symptoms: no chest pain, no confusion, no diarrhea, no dysuria, no ear pain, no headaches, no myalgias, no nausea, no rash, no somnolence, no sore throat and no vomiting    Risk factors: sick contacts    Risk factors: no contaminated food, no contaminated water, no hx of cancer, no immunosuppression, no occupational exposure and no recent travel        Review of Systems   Constitutional: Positive for chills and fever. Negative for activity change, appetite change, diaphoresis and fatigue.   HENT: Positive for congestion. Negative for drooling, ear pain, facial swelling, hearing loss, sinus pressure and sore throat.    Eyes: Negative.  Negative for discharge.   Respiratory: Positive for cough.    Cardiovascular: Negative for chest pain.   Gastrointestinal: Negative for abdominal distention, abdominal pain, blood in stool, diarrhea, nausea and vomiting.   Endocrine: Negative.  Negative for cold intolerance, heat intolerance, polydipsia, polyphagia and polyuria.   Genitourinary: Negative.  Negative for dysuria, flank pain and urgency.   Musculoskeletal: Negative.  Negative for arthralgias, back pain, myalgias and neck stiffness.   Skin: Negative.  Negative for color change, pallor and rash.   Allergic/Immunologic: Negative.    Neurological: Negative.  Negative for dizziness, seizures, speech difficulty, weakness, numbness and headaches.   Hematological: Negative.  Negative for adenopathy.    Psychiatric/Behavioral: Negative for confusion.   All other systems reviewed and are negative.      Past Medical History:   Diagnosis Date   • Diabetes (CMS/HCC)    • Hyperlipemia    • Hypertension    • Renal disease        No Known Allergies    Past Surgical History:   Procedure Laterality Date   • CHOLECYSTECTOMY     • HIP ARTHROPLASTY Left        Family History   Problem Relation Age of Onset   • Diabetes Mother        Social History     Socioeconomic History   • Marital status:      Spouse name: Not on file   • Number of children: Not on file   • Years of education: Not on file   • Highest education level: Not on file   Tobacco Use   • Smoking status: Never Smoker   • Smokeless tobacco: Never Used   Substance and Sexual Activity   • Alcohol use: No     Frequency: Never   • Drug use: Never           Objective   Physical Exam   Constitutional: She is oriented to person, place, and time. She appears well-developed and well-nourished.   HENT:   Head: Normocephalic.   Right Ear: External ear normal.   Eyes: Pupils are equal, round, and reactive to light. Conjunctivae are normal.   Neck: Normal range of motion. Neck supple.   Cardiovascular: Normal rate, regular rhythm, normal heart sounds and intact distal pulses. PMI is not displaced. Exam reveals no decreased pulses.   No murmur heard.  Pulmonary/Chest: Effort normal. No accessory muscle usage. Tachypnea noted. No respiratory distress. She has decreased breath sounds in the right lower field and the left lower field. She has wheezes. She has no rales. She exhibits no tenderness.   Abdominal: Soft. Bowel sounds are normal. There is no tenderness.   Musculoskeletal: Normal range of motion. She exhibits no edema or tenderness.        Lumbar back: Normal.   Lower extremity exam bilaterally is unremarkable.  There is no right or left calf tenderness .  There is no palpable venous cord.  No obvious difference in the size of the legs.  No pitting edema.  The  dorsalis pedis and posterior tibial femoral and popliteal pulses are palpable and +2 bilaterally.  Homans sign is negative   Neurological: She is alert and oriented to person, place, and time. She has normal reflexes. No cranial nerve deficit. Coordination normal. GCS eye subscore is 4. GCS verbal subscore is 5. GCS motor subscore is 6.   Somewhat confused   Skin: Skin is warm. No rash noted. No erythema.   Nursing note and vitals reviewed.      Procedures           ED Course  ED Course as of May 02 1226   Sat Apr 04, 2020   1143 Patient was tested positive for covid  as an outpatient I do not have access to the test result record the swab has been sent the patient getting progressively weak and coughing and replaced antibiotics cultures have been obtained  will be admitted to hospital    [TS]   1149 Per the call received by the primary MD and also by the covering him medical doctor the patient was sent to the ER to be admitted to the hospital she is getting progressively worse.  Her lab work-up do not look very remarkable but she may very well have coronavirus infection especially with the fact that her recent covid test was positive  The patient's daughter is a nurse practitioner and has been trying to keep her at home but she was getting progressively weaker more cough fever and more complain of shortness of breath and so she was sent to the ER to be admitted    [TS]      ED Course User Index  [TS] Davian Antoine MD                                           MDM  Number of Diagnoses or Management Options  Diagnosis management comments: Patient with fever cough congestion cover test positive       Amount and/or Complexity of Data Reviewed  Clinical lab tests: reviewed and ordered  Tests in the radiology section of CPT®: ordered and reviewed  Tests in the medicine section of CPT®: ordered and reviewed    Risk of Complications, Morbidity, and/or Mortality  Presenting problems: moderate  Diagnostic procedures:  moderate  Management options: moderate        Final diagnoses:   Febrile illness   Pneumonia due to infectious organism, unspecified laterality, unspecified part of lung   Chronic kidney disease, unspecified CKD stage   Transaminitis   COVID-19 virus infection            Davian Antoine MD  04/04/20 1152       Davian Antoine MD  05/02/20 1226

## 2020-04-04 NOTE — H&P
[facility] Medicine Services  HISTORY AND PHYSICAL    Date of Admission: 4/4/2020  Primary Care Physician: Duy Hall MD    Subjective     Chief Complaint: sob, cough    History of Present Illness    80-year-old white female with known history of diabetes hypertension who has apparently been being followed somewhat on outpatient basis by her primary provider Dr. Guerrero for about a one-week history of cough shortness of breath and feeling poorly.  The history is obtained somewhat from the on-call provider today who called me, the ER provider as well as the patient however she is not a great historian.  She tells me she has had a cough and fever for 1 week.  She was tested for Kopit on outpatient basis which apparently resulted positive yesterday.  She continued to digress however feeling worse therefore decision was made by her daughter to send her to the hospital today.  She states she is does not have a great appetite.  In the ER the patient was not hypoxic.  She was found to have a white count of 15,000, an elevated LDH, elevated ferritin, elevated CRP, this x-ray fairly unremarkable other than some atelectasis, blood cultures obtained.        Review of Systems     Otherwise complete ROS reviewed and negative except as mentioned in the HPI.    Past Medical History:   Past Medical History:   Diagnosis Date   • Diabetes (CMS/HCC)    • Hyperlipemia    • Hypertension    • Renal disease      Past Surgical History:  Past Surgical History:   Procedure Laterality Date   • CHOLECYSTECTOMY     • HIP ARTHROPLASTY Left      Social History:  reports that she has never smoked. She has never used smokeless tobacco. She reports that she does not drink alcohol or use drugs.    Family History: family history includes Diabetes in her mother.       Allergies:  No Known Allergies  Medications:  Prior to Admission medications    Medication Sig Start Date End Date Taking? Authorizing  "Provider   acetaminophen (TYLENOL) 500 MG tablet Take 500 mg by mouth Every 6 (Six) Hours As Needed for Mild Pain .   Yes Byron Montano MD   amLODIPine-benazepril (LOTREL 5-10) 5-10 MG per capsule  4/18/19  Yes ProviderByron MD   calcitriol (ROCALTROL) 0.25 MCG capsule  5/20/19  Yes ProviderByron MD   famotidine (PEPCID) 20 MG tablet Take 20 mg by mouth Daily.   Yes ProviderByron MD   gabapentin (NEURONTIN) 100 MG capsule  4/18/19  Yes ProviderByron MD   LANTUS SOLOSTAR 100 UNIT/ML injection pen  5/31/19  Yes ProviderByron MD   magnesium chloride ER 64 MG DR tablet Take  by mouth Daily.   Yes ProviderByron MD   NOVOLOG FLEXPEN 100 UNIT/ML solution pen-injector sc pen  5/31/19  Yes ProviderByron MD   Omega-3 Fatty Acids (FISH OIL) 1000 MG capsule capsule Take  by mouth Daily With Breakfast.   Yes ProviderByron MD   rosuvastatin (CRESTOR) 20 MG tablet  4/18/19  Yes Provider, MD Byron   Semaglutide (OZEMPIC) 0.25 or 0.5 MG/DOSE solution pen-injector Inject 0.5 mg under the skin into the appropriate area as directed 1 (One) Time Per Week.   Yes ProviderByron MD   torsemide (DEMADEX) 20 MG tablet  3/5/19  Yes ProviderByron MD   ULORIC 40 MG tablet  5/20/19  Yes Provider, MD Byron   Vitamin D, Cholecalciferol, (CHOLECALCIFEROL) 400 units tablet Take 2,000 Units by mouth Daily.   Yes Provider, MD Byron     Objective     Vital Signs: /48 (BP Location: Left arm, Patient Position: Sitting)   Pulse 91   Temp (!) 102.2 °F (39 °C) (Oral)   Resp 28   Ht 167.6 cm (66\")   Wt 95 kg (209 lb 8 oz)   SpO2 (!) 89%   BMI 33.81 kg/m²   Physical Exam   Constitutional:   Elderly-appearing female, lying in bed comfortably.  Asleep when I entered however awoke easily to my voice.  Able to answer most of my questions.  In no acute distress however she does appear to feel ill   HENT:   Head: Normocephalic and atraumatic.   " "  Eyes: Pupils are equal, round, and reactive to light. Conjunctivae and EOM are normal.   Neck: Neck supple. No JVD present.   Cardiovascular: Normal rate and regular rhythm. Exam reveals no gallop and no friction rub.   No murmur heard.  Pulmonary/Chest: No respiratory distress. She has no wheezes. She has no rales. She exhibits no tenderness.   Abdominal: Soft. Bowel sounds are normal. She exhibits no distension. There is no tenderness. There is no rebound and no guarding.   Musculoskeletal: Normal range of motion. She exhibits no edema, tenderness or deformity.   Neurological: She is alert. She displays normal reflexes. No cranial nerve deficit. She exhibits normal muscle tone.   Skin: Skin is warm and dry. No rash noted.   Psychiatric: She has a normal mood and affect. Her behavior is normal.           Results Reviewed:  Lab Results (last 24 hours)     Procedure Component Value Units Date/Time    Lactate Dehydrogenase [[id number]]  (Abnormal) Collected:  04/04/20 1105    Specimen:  Blood Updated:  04/04/20 1149      U/L      Comment: Specimen hemolyzed.  Results may be affected.       Procalcitonin [[id number]]  (Abnormal) Collected:  04/04/20 1105    Specimen:  Blood Updated:  04/04/20 1143     Procalcitonin 0.69 ng/mL     Narrative:       As a Marker for Sepsis (Non-Neonates):   1. <0.5 ng/mL represents a low risk of severe sepsis and/or septic shock.  1. >2 ng/mL represents a high risk of severe sepsis and/or septic shock.    As a Marker for Lower Respiratory Tract Infections that require antibiotic therapy:  PCT on Admission     Antibiotic Therapy             6-12 Hrs later  > 0.5                Strongly Recommended            >0.25 - <0.5         Recommended  0.1 - 0.25           Discouraged                   Remeasure/reassess PCT  <0.1                 Strongly Discouraged          Remeasure/reassess PCT      As 28 day mortality risk marker: \"Change in Procalcitonin Result\" (> 80 % or <=80 %) if Day " 0 (or Day 1) and Day 4 values are available. Refer to [email or web address]   Change in PCT <=80 %   A decrease of PCT levels below or equal to 80 % defines a positive change in PCT test result representing a higher risk for 28-day all-cause mortality of patients diagnosed with severe sepsis or septic shock.  Change in PCT > 80 %   A decrease of PCT levels of more than 80 % defines a negative change in PCT result representing a lower risk for 28-day all-cause mortality of patients diagnosed with severe sepsis or septic shock.                Results may be falsely decreased if patient taking Biotin.     Comprehensive Metabolic Panel [[id number]]  (Abnormal) Collected:  04/04/20 1105    Specimen:  Blood Updated:  04/04/20 1142     Glucose 226 mg/dL      BUN 51 mg/dL      Creatinine 2.26 mg/dL      Sodium 132 mmol/L      Potassium 4.3 mmol/L      Chloride 96 mmol/L      CO2 21.0 mmol/L      Calcium 9.2 mg/dL      Total Protein 7.3 g/dL      Albumin 3.20 g/dL      ALT (SGPT) 183 U/L      AST (SGOT) 212 U/L      Alkaline Phosphatase 92 U/L      Total Bilirubin 0.7 mg/dL      eGFR Non African Amer 21 mL/min/1.73      Globulin 4.1 gm/dL      A/G Ratio 0.8 g/dL      BUN/Creatinine Ratio 22.6     Anion Gap 15.0 mmol/L     Narrative:       GFR Normal >60  Chronic Kidney Disease <60  Kidney Failure <15      C-reactive Protein [[id number]]  (Abnormal) Collected:  04/04/20 1105    Specimen:  Blood Updated:  04/04/20 1142     C-Reactive Protein 14.96 mg/dL     Ferritin [[id number]]  (Abnormal) Collected:  04/04/20 1105    Specimen:  Blood Updated:  04/04/20 1142     Ferritin 1,835.00 ng/mL     Narrative:       Results may be falsely decreased if patient taking Biotin.      Blood Culture - Blood, Arm, Right [[id number]] Collected:  04/04/20 1057    Specimen:  Blood from Arm, Right Updated:  04/04/20 1140    Blood Culture - Blood, Arm, Left [[id number]] Collected:  04/04/20 1105    Specimen:  Blood from Arm, Left  Updated:  04/04/20 1139    Lactic Acid, Plasma [[id number]]  (Normal) Collected:  04/04/20 1105    Specimen:  Blood Updated:  04/04/20 1134     Lactate 1.9 mmol/L     CORONAVIRUS (COVID-19),RT-PCR,DIATHERIX,Puritan Opti-Swab Kit Only - Swab, Nasopharynx [[id number]] Collected:  04/04/20 1048    Specimen:  Swab from Nasopharynx Updated:  04/04/20 1118    CBC & Differential [[id number]] Collected:  04/04/20 1105    Specimen:  Blood Updated:  04/04/20 1118    Narrative:       The following orders were created for panel order CBC & Differential.  Procedure                               Abnormality         Status                     ---------                               -----------         ------                     CBC Auto Differential[[id number]]        Abnormal            Final result                 Please view results for these tests on the individual orders.    CBC Auto Differential [[id number]]  (Abnormal) Collected:  04/04/20 1105    Specimen:  Blood Updated:  04/04/20 1118     WBC 15.82 10*3/mm3      RBC 3.66 10*6/mm3      Hemoglobin 11.5 g/dL      Hematocrit 34.1 %      MCV 93.2 fL      MCH 31.4 pg      MCHC 33.7 g/dL      RDW 13.2 %      RDW-SD 45.1 fl      MPV 10.3 fL      Platelets 297 10*3/mm3      Neutrophil % 88.0 %      Lymphocyte % 7.3 %      Monocyte % 4.0 %      Eosinophil % 0.0 %      Basophil % 0.2 %      Immature Grans % 0.5 %      Neutrophils, Absolute 13.92 10*3/mm3      Lymphocytes, Absolute 1.15 10*3/mm3      Monocytes, Absolute 0.64 10*3/mm3      Eosinophils, Absolute 0.00 10*3/mm3      Basophils, Absolute 0.03 10*3/mm3      Immature Grans, Absolute 0.08 10*3/mm3      nRBC 0.0 /100 WBC         Imaging Results (Last 24 Hours)     Procedure Component Value Units Date/Time    XR Chest 1 View [[id number]] Collected:  04/04/20 1111     Updated:  04/04/20 1116    Narrative:       EXAMINATION: XR CHEST 1 VW-. 4/4/2020 11:11 AM CDT     CHEST, ONE VIEW:     HISTORY: Shortness of air     COMPARISON:  6/8/2009 and 2/23/2009     A single frontal chest radiograph was obtained.     FINDINGS:     The level inspiration is shallow and lung volumes diminished.     There is mild airspace opacities in the right lung base, atelectasis  associated with the level inspiration considered. Underlying infiltrate  from infectious or inflammatory process not excluded. Correlate with  patient presentation.     The left lung is grossly clear.     The heart is likely within the upper limits of normal in size with  ectasia aorta. The pulmonary circulation appropriate, without heart  failure.     Small sclerotic lesion noted in the right humeral neck, enchondroma  considered.     Degenerative spurring noted in the thoracic spine.                                     Impression:       1. Shallow inspiration with diminished lung volumes. Mild right basilar  atelectasis suspected.     This report was finalized on 04/04/2020 11:13 by Dr. Bola Pratt MD.        I have personally reviewed and interpreted the radiology studies and ECG obtained at time of admission.     Assessment / Plan     Assessment:   Active Hospital Problems    Diagnosis   • Febrile illness   • COVID-19 virus detected   • Type 2 diabetes mellitus (CMS/HCC)         Plan:    1. ID consulted. Discussed with them.  2. Started on plaquenil.   3. Supportive care.  4. SSI   5. Monitor on tele  6. Low dose lovenox      Code Status: DNR aggressive     I discussed the patient's findings and my recommendations with the nurse    Estimated length of stay 1-2 days    Patient seen and examined by me on 4/4/20 at 1600.    Paulette Bell MD   04/04/20   17:17

## 2020-04-05 LAB
ALBUMIN SERPL-MCNC: 2.6 G/DL (ref 3.5–5.2)
ALBUMIN/GLOB SERPL: 0.6 G/DL
ALP SERPL-CCNC: 130 U/L (ref 39–117)
ALT SERPL W P-5'-P-CCNC: 445 U/L (ref 1–33)
ANION GAP SERPL CALCULATED.3IONS-SCNC: 14 MMOL/L (ref 5–15)
AST SERPL-CCNC: 444 U/L (ref 1–32)
BASOPHILS # BLD AUTO: 0.03 10*3/MM3 (ref 0–0.2)
BASOPHILS NFR BLD AUTO: 0.2 % (ref 0–1.5)
BILIRUB SERPL-MCNC: 0.8 MG/DL (ref 0.2–1.2)
BUN BLD-MCNC: 42 MG/DL (ref 8–23)
BUN/CREAT SERPL: 24.1 (ref 7–25)
CALCIUM SPEC-SCNC: 9.1 MG/DL (ref 8.6–10.5)
CHLORIDE SERPL-SCNC: 100 MMOL/L (ref 98–107)
CO2 SERPL-SCNC: 20 MMOL/L (ref 22–29)
CREAT BLD-MCNC: 1.74 MG/DL (ref 0.57–1)
CRP SERPL-MCNC: 20.34 MG/DL (ref 0–0.5)
D DIMER PPP FEU-MCNC: 3.25 MG/L (FEU) (ref 0–0.5)
DEPRECATED RDW RBC AUTO: 45 FL (ref 37–54)
EOSINOPHIL # BLD AUTO: 0 10*3/MM3 (ref 0–0.4)
EOSINOPHIL NFR BLD AUTO: 0 % (ref 0.3–6.2)
ERYTHROCYTE [DISTWIDTH] IN BLOOD BY AUTOMATED COUNT: 13.5 % (ref 12.3–15.4)
FERRITIN SERPL-MCNC: 4467 NG/ML (ref 13–150)
GFR SERPL CREATININE-BSD FRML MDRD: 28 ML/MIN/1.73
GLOBULIN UR ELPH-MCNC: 4.3 GM/DL
GLUCOSE BLD-MCNC: 241 MG/DL (ref 65–99)
GLUCOSE BLDC GLUCOMTR-MCNC: 214 MG/DL (ref 70–130)
GLUCOSE BLDC GLUCOMTR-MCNC: 219 MG/DL (ref 70–130)
GLUCOSE BLDC GLUCOMTR-MCNC: 230 MG/DL (ref 70–130)
GLUCOSE BLDC GLUCOMTR-MCNC: 247 MG/DL (ref 70–130)
HCT VFR BLD AUTO: 32.3 % (ref 34–46.6)
HGB BLD-MCNC: 11 G/DL (ref 12–15.9)
IMM GRANULOCYTES # BLD AUTO: 0.18 10*3/MM3 (ref 0–0.05)
IMM GRANULOCYTES NFR BLD AUTO: 1 % (ref 0–0.5)
LDH SERPL-CCNC: 517 U/L (ref 135–214)
LYMPHOCYTES # BLD AUTO: 0.93 10*3/MM3 (ref 0.7–3.1)
LYMPHOCYTES NFR BLD AUTO: 5 % (ref 19.6–45.3)
MCH RBC QN AUTO: 31.3 PG (ref 26.6–33)
MCHC RBC AUTO-ENTMCNC: 34.1 G/DL (ref 31.5–35.7)
MCV RBC AUTO: 91.8 FL (ref 79–97)
MONOCYTES # BLD AUTO: 0.58 10*3/MM3 (ref 0.1–0.9)
MONOCYTES NFR BLD AUTO: 3.1 % (ref 5–12)
NEUTROPHILS # BLD AUTO: 17.05 10*3/MM3 (ref 1.7–7)
NEUTROPHILS NFR BLD AUTO: 90.7 % (ref 42.7–76)
NRBC BLD AUTO-RTO: 0 /100 WBC (ref 0–0.2)
PLATELET # BLD AUTO: 340 10*3/MM3 (ref 140–450)
PMV BLD AUTO: 10.2 FL (ref 6–12)
POTASSIUM BLD-SCNC: 4.3 MMOL/L (ref 3.5–5.2)
PROT SERPL-MCNC: 6.9 G/DL (ref 6–8.5)
RBC # BLD AUTO: 3.52 10*6/MM3 (ref 3.77–5.28)
SODIUM BLD-SCNC: 134 MMOL/L (ref 136–145)
WBC NRBC COR # BLD: 18.77 10*3/MM3 (ref 3.4–10.8)

## 2020-04-05 PROCEDURE — 85025 COMPLETE CBC W/AUTO DIFF WBC: CPT | Performed by: INTERNAL MEDICINE

## 2020-04-05 PROCEDURE — 82962 GLUCOSE BLOOD TEST: CPT

## 2020-04-05 PROCEDURE — 82728 ASSAY OF FERRITIN: CPT | Performed by: INTERNAL MEDICINE

## 2020-04-05 PROCEDURE — 94799 UNLISTED PULMONARY SVC/PX: CPT

## 2020-04-05 PROCEDURE — 63710000001 INSULIN LISPRO (HUMAN) PER 5 UNITS: Performed by: FAMILY MEDICINE

## 2020-04-05 PROCEDURE — 83615 LACTATE (LD) (LDH) ENZYME: CPT | Performed by: INTERNAL MEDICINE

## 2020-04-05 PROCEDURE — 86140 C-REACTIVE PROTEIN: CPT | Performed by: INTERNAL MEDICINE

## 2020-04-05 PROCEDURE — 25010000002 ENOXAPARIN PER 10 MG: Performed by: FAMILY MEDICINE

## 2020-04-05 PROCEDURE — 80053 COMPREHEN METABOLIC PANEL: CPT | Performed by: INTERNAL MEDICINE

## 2020-04-05 PROCEDURE — 85379 FIBRIN DEGRADATION QUANT: CPT | Performed by: INTERNAL MEDICINE

## 2020-04-05 RX ORDER — ACETAMINOPHEN 160 MG/5ML
650 SOLUTION ORAL EVERY 6 HOURS PRN
Status: DISCONTINUED | OUTPATIENT
Start: 2020-04-05 | End: 2020-04-21 | Stop reason: HOSPADM

## 2020-04-05 RX ORDER — ACETAMINOPHEN 325 MG/1
650 TABLET ORAL EVERY 6 HOURS PRN
Status: DISCONTINUED | OUTPATIENT
Start: 2020-04-05 | End: 2020-04-21 | Stop reason: HOSPADM

## 2020-04-05 RX ORDER — ACETAMINOPHEN 650 MG/1
650 SUPPOSITORY RECTAL EVERY 6 HOURS PRN
Status: DISCONTINUED | OUTPATIENT
Start: 2020-04-05 | End: 2020-04-21 | Stop reason: HOSPADM

## 2020-04-05 RX ADMIN — ACETAMINOPHEN 650 MG: 325 TABLET, FILM COATED ORAL at 21:19

## 2020-04-05 RX ADMIN — SODIUM CHLORIDE, PRESERVATIVE FREE 10 ML: 5 INJECTION INTRAVENOUS at 20:57

## 2020-04-05 RX ADMIN — SODIUM CHLORIDE, PRESERVATIVE FREE 10 ML: 5 INJECTION INTRAVENOUS at 08:54

## 2020-04-05 RX ADMIN — ACETAMINOPHEN 650 MG: 325 TABLET, FILM COATED ORAL at 06:36

## 2020-04-05 RX ADMIN — INSULIN LISPRO 3 UNITS: 100 INJECTION, SOLUTION INTRAVENOUS; SUBCUTANEOUS at 08:54

## 2020-04-05 RX ADMIN — INSULIN LISPRO 3 UNITS: 100 INJECTION, SOLUTION INTRAVENOUS; SUBCUTANEOUS at 20:53

## 2020-04-05 RX ADMIN — ACETAMINOPHEN 650 MG: 325 TABLET, FILM COATED ORAL at 13:17

## 2020-04-05 RX ADMIN — INSULIN LISPRO 3 UNITS: 100 INJECTION, SOLUTION INTRAVENOUS; SUBCUTANEOUS at 11:59

## 2020-04-05 RX ADMIN — ACETAMINOPHEN 650 MG: 325 TABLET, FILM COATED ORAL at 17:13

## 2020-04-05 RX ADMIN — ACETAMINOPHEN 650 MG: 325 TABLET, FILM COATED ORAL at 01:21

## 2020-04-05 RX ADMIN — INSULIN LISPRO 3 UNITS: 100 INJECTION, SOLUTION INTRAVENOUS; SUBCUTANEOUS at 17:09

## 2020-04-05 RX ADMIN — ENOXAPARIN SODIUM 30 MG: 30 INJECTION SUBCUTANEOUS at 17:09

## 2020-04-05 NOTE — PLAN OF CARE
Problem: Patient Care Overview  Goal: Plan of Care Review  Outcome: Ongoing (interventions implemented as appropriate)  Flowsheets (Taken 4/5/2020 0330)  Progress: no change  Plan of Care Reviewed With: patient  Outcome Summary: Pt medicated with tylenol x2 for fever. Pt temperature ranging from 99.6 to 100.2. Plaquenil not given as qtc was 455 when checked at 2130. Up with assist x1 to bathroom. O2 @ 3L NC. Other VSS, will cont to monitor.

## 2020-04-05 NOTE — PROGRESS NOTES
[facility] Medicine Services  INPATIENT PROGRESS NOTE    Patient Name: Gema Chapman  Date of Admission: 4/4/2020  Today's Date: 04/05/20  Length of Stay: 1  Primary Care Physician: Duy Hall MD    Subjective   Chief Complaint: feeling better today, f/u cough, fever  HPI   Patient is sitting up in her chair today and tells me that she is feeling much better.  She did however have to be placed on 3 L nasal cannula and had been on room air yesterday.  She looks much better however though and states that she feels she is improving.  She does continue to cough while in the room.  She denies having any pain.  She does continue to be febrile.  Per nursing staff they did get her up and walk her to the bathroom.  They states she says she only uses a wheelchair normally at home however she was able to ambulate with them to the bathroom.  Says she was very winded afterwards.        Review of Systems     All pertinent negatives and positives are as above. All other systems have been reviewed and are negative unless otherwise stated.     Objective    Temp:  [99.6 °F (37.6 °C)-102.2 °F (39 °C)] 100 °F (37.8 °C)  Heart Rate:  [] 76  Resp:  [15-28] 18  BP: (103-148)/() 112/61  Physical Exam   Constitutional:   Patient appears much better than yesterday.  She is upright in the chair.  Nasal cannula in place.  No distress however she does continue to cough.  She is awake and alert   HENT:   Head: Normocephalic and atraumatic.   Neck: Neck supple. No JVD present.   Cardiovascular: Exam reveals no gallop and no friction rub.   No murmur heard.  Pulmonary/Chest: Effort normal. No respiratory distress. She has no wheezes. She has no rales. She exhibits no tenderness.   Abdominal: She exhibits no distension. There is no tenderness. There is no rebound and no guarding.   Musculoskeletal: Normal range of motion. She exhibits no edema, tenderness or deformity.   Neurological:  She is alert. She displays normal reflexes. No cranial nerve deficit. She exhibits normal muscle tone.   Skin: Skin is warm and dry. No rash noted.   Psychiatric: She has a normal mood and affect. Her behavior is normal. Judgment and thought content normal.           Results Review:  I have reviewed the labs, radiology results, and diagnostic studies.    Laboratory Data:   Results from last 7 days   Lab Units 04/05/20  1045 04/04/20  1105   WBC 10*3/mm3 18.77* 15.82*   HEMOGLOBIN g/dL 11.0* 11.5*   HEMATOCRIT % 32.3* 34.1   PLATELETS 10*3/mm3 340 297        Results from last 7 days   Lab Units 04/05/20  1045 04/04/20  1105   SODIUM mmol/L 134* 132*   POTASSIUM mmol/L 4.3 4.3   CHLORIDE mmol/L 100 96*   CO2 mmol/L 20.0* 21.0*   BUN mg/dL 42* 51*   CREATININE mg/dL 1.74* 2.26*   CALCIUM mg/dL 9.1 9.2   BILIRUBIN mg/dL 0.8 0.7   ALK PHOS U/L 130* 92   ALT (SGPT) U/L 445* 183*   AST (SGOT) U/L 444* 212*   GLUCOSE mg/dL 241* 226*       Culture Data:   No results found for: BLOODCX, URINECX, WOUNDCX, MRSACX, RESPCX, STOOLCX    Radiology Data:   Imaging Results (Last 24 Hours)     ** No results found for the last 24 hours. **          I have reviewed the patient's current medications.     Assessment/Plan     Active Hospital Problems    Diagnosis   • Febrile illness   • COVID-19 virus detected   • Type 2 diabetes mellitus (CMS/HCC)       1. Pt now requiring 3 L NC. Will watch her closely as she has high chance of doing poorly, although actually looks a lot better today  2. ID had planned to do plaquenil however baseline QT was already more prolonged than he wanted. Decided to not give. Will also d/c azithro  4.  Worsening lft's today and white count. Will not check labs tomorrow  5. SSI for DM  6. bp is slightly lower this am than it has been. Will monitor closely                 Discharge Planning: I expect the patient to be discharged to home vs NH in several days.    Paulette Bell MD   04/05/20   13:37

## 2020-04-05 NOTE — PROGRESS NOTES
Discharge Planning Assessment  [facility]     Patient Name: Gema Chapman  MRN: [id number]  Today's Date: 4/5/2020    Admit Date: 4/4/2020    Discharge Needs Assessment     Row Name 04/05/20 0942       Living Environment    Lives With  child(lior), adult    Current Living Arrangements  home/apartment/condo    Primary Care Provided by  self    Provides Primary Care For  no one    Family Caregiver if Needed  child(lior), adult    Quality of Family Relationships  helpful;involved;supportive    Able to Return to Prior Arrangements  yes       Resource/Environmental Concerns    Resource/Environmental Concerns  none    Transportation Concerns  car, none       Transition Planning    Patient/Family Anticipates Transition to  home with family    Patient/Family Anticipated Services at Transition  none    Transportation Anticipated  family or friend will provide       Discharge Needs Assessment    Readmission Within the Last 30 Days  no previous admission in last 30 days    Concerns to be Addressed  no discharge needs identified;denies needs/concerns at this time    Equipment Currently Used at Home  walker, rolling;shower chair;wheelchair;commode toilet riser    Anticipated Changes Related to Illness  none    Equipment Needed After Discharge  none    Discharge Coordination/Progress  Pt has RX coverage and a PCP. Pt is positive for COVID-19. Pt lived at home with her daughter, Tea, prior to admission. SW spoke with Tea on phone who states that she is having symptoms as well. Tea does plan for pt to return home with her at discharge if possible. Tea has been educated that if pt is in need of SNF placement that pt will need to be quarantined for the suggested time prior to placement. Tea is also aware that SNF are admitting from home due to COVID-19. Pt has needed DME at home. Tea denies any needs at this time. SW will follow and assist with any other discharge needs that may arise.         Discharge Plan    No  documentation.       Destination      Coordination has not been started for this encounter.      Durable Medical Equipment      Coordination has not been started for this encounter.      Dialysis/Infusion      Coordination has not been started for this encounter.      Home Medical Care      Coordination has not been started for this encounter.      Therapy      Coordination has not been started for this encounter.      Community Resources      Coordination has not been started for this encounter.          Demographic Summary    No documentation.       Functional Status    No documentation.       Psychosocial    No documentation.       Abuse/Neglect    No documentation.       Legal    No documentation.       Substance Abuse    No documentation.       Patient Forms    No documentation.           Destiny Mondragon

## 2020-04-05 NOTE — PLAN OF CARE
Problem: Patient Care Overview  Goal: Plan of Care Review  Outcome: Ongoing (interventions implemented as appropriate)  Flowsheets (Taken 4/5/2020 4041)  Progress: no change  Plan of Care Reviewed With: patient  Note:   Patient has C/O generalized back discomfort, prn tylenol per orders. Patient sat up in chair for some of the shift. Patient desaturated  to 86%; O2 increased to 4L's, saturations maintained in 90's. Frequent, non-productive cough noted. Patient does coughs while trying communicate. No appetite. Enhanced Droplet/Contact precautions. Patient repositioned and pillows utilized to aide with patients gluteal redness/maroon discoloration. Safety Maintained. Will continue to monitor.   Goal: Individualization and Mutuality  Outcome: Ongoing (interventions implemented as appropriate)  Goal: Discharge Needs Assessment  Outcome: Ongoing (interventions implemented as appropriate)  Goal: Interprofessional Rounds/Family Conf  Outcome: Ongoing (interventions implemented as appropriate)     Problem: Infection, Risk/Actual (Adult)  Goal: Identify Related Risk Factors and Signs and Symptoms  Outcome: Ongoing (interventions implemented as appropriate)  Goal: Infection Prevention/Resolution  Outcome: Ongoing (interventions implemented as appropriate)     Problem: Diabetes, Type 2 (Adult)  Goal: Signs and Symptoms of Listed Potential Problems Will be Absent, Minimized or Managed (Diabetes, Type 2)  Outcome: Ongoing (interventions implemented as appropriate)     Problem: Breathing Pattern Ineffective (Adult)  Goal: Identify Related Risk Factors and Signs and Symptoms  Outcome: Ongoing (interventions implemented as appropriate)  Goal: Effective Oxygenation/Ventilation  Outcome: Ongoing (interventions implemented as appropriate)  Goal: Anxiety/Fear Reduction  Outcome: Ongoing (interventions implemented as appropriate)

## 2020-04-05 NOTE — PROGRESS NOTES
"Pharmacy Dosing Service  Renal Adjustment  Acetaminophen    Action/Assessment/Plan:  APAP dosing frequency changed from q4h prn to q6h prn based on renal function. LFT results considered as well. Pharmacy will continue to follow.     80 y.o. female Ht: 167.6 cm (66\"); Wt: 95 kg (209 lb 8 oz) Body mass index is 33.81 kg/m².  Estimated Creatinine Clearance: 29 mL/min (A) (by C-G formula based on SCr of 1.74 mg/dL (H)).    BUN   Date Value Ref Range Status   04/05/2020 42 (H) 8 - 23 mg/dL Final   04/04/2020 51 (H) 8 - 23 mg/dL Final      Creatinine   Date Value Ref Range Status   04/05/2020 1.74 (H) 0.57 - 1.00 mg/dL Final   04/04/2020 2.26 (H) 0.57 - 1.00 mg/dL Final     LFT Results      4/4/2020 4/5/2020          11:05 AM 10:45 AM         AST 212High  444High          ALT 183High  445High              Masoud Frank, PharmD  04/05/20 17:39    "

## 2020-04-06 PROBLEM — N18.4 CKD (CHRONIC KIDNEY DISEASE) STAGE 4, GFR 15-29 ML/MIN (HCC): Status: ACTIVE | Noted: 2020-04-06

## 2020-04-06 LAB
ALBUMIN SERPL-MCNC: 2.9 G/DL (ref 3.5–5.2)
ALBUMIN/GLOB SERPL: 0.7 G/DL
ALP SERPL-CCNC: 151 U/L (ref 39–117)
ALT SERPL W P-5'-P-CCNC: 414 U/L (ref 1–33)
ANION GAP SERPL CALCULATED.3IONS-SCNC: 15 MMOL/L (ref 5–15)
AST SERPL-CCNC: 259 U/L (ref 1–32)
BASOPHILS # BLD AUTO: 0.06 10*3/MM3 (ref 0–0.2)
BASOPHILS NFR BLD AUTO: 0.3 % (ref 0–1.5)
BILIRUB SERPL-MCNC: 0.9 MG/DL (ref 0.2–1.2)
BUN BLD-MCNC: 51 MG/DL (ref 8–23)
BUN/CREAT SERPL: 24.4 (ref 7–25)
CALCIUM SPEC-SCNC: 9.4 MG/DL (ref 8.6–10.5)
CHLORIDE SERPL-SCNC: 99 MMOL/L (ref 98–107)
CO2 SERPL-SCNC: 21 MMOL/L (ref 22–29)
CREAT BLD-MCNC: 2.09 MG/DL (ref 0.57–1)
CRP SERPL-MCNC: 23.83 MG/DL (ref 0–0.5)
D DIMER PPP FEU-MCNC: 3.64 MG/L (FEU) (ref 0–0.5)
DEPRECATED RDW RBC AUTO: 46 FL (ref 37–54)
EOSINOPHIL # BLD AUTO: 0 10*3/MM3 (ref 0–0.4)
EOSINOPHIL NFR BLD AUTO: 0 % (ref 0.3–6.2)
ERYTHROCYTE [DISTWIDTH] IN BLOOD BY AUTOMATED COUNT: 13.7 % (ref 12.3–15.4)
FERRITIN SERPL-MCNC: 3900 NG/ML (ref 13–150)
GFR SERPL CREATININE-BSD FRML MDRD: 23 ML/MIN/1.73
GLOBULIN UR ELPH-MCNC: 4.2 GM/DL
GLUCOSE BLD-MCNC: 231 MG/DL (ref 65–99)
GLUCOSE BLDC GLUCOMTR-MCNC: 201 MG/DL (ref 70–130)
GLUCOSE BLDC GLUCOMTR-MCNC: 217 MG/DL (ref 70–130)
GLUCOSE BLDC GLUCOMTR-MCNC: 222 MG/DL (ref 70–130)
GLUCOSE BLDC GLUCOMTR-MCNC: 248 MG/DL (ref 70–130)
HCT VFR BLD AUTO: 35.6 % (ref 34–46.6)
HGB BLD-MCNC: 12.1 G/DL (ref 12–15.9)
IMM GRANULOCYTES # BLD AUTO: 0.22 10*3/MM3 (ref 0–0.05)
IMM GRANULOCYTES NFR BLD AUTO: 1.1 % (ref 0–0.5)
LDH SERPL-CCNC: 399 U/L (ref 135–214)
LYMPHOCYTES # BLD AUTO: 0.83 10*3/MM3 (ref 0.7–3.1)
LYMPHOCYTES NFR BLD AUTO: 4.3 % (ref 19.6–45.3)
MCH RBC QN AUTO: 31.3 PG (ref 26.6–33)
MCHC RBC AUTO-ENTMCNC: 34 G/DL (ref 31.5–35.7)
MCV RBC AUTO: 92.2 FL (ref 79–97)
MONOCYTES # BLD AUTO: 0.56 10*3/MM3 (ref 0.1–0.9)
MONOCYTES NFR BLD AUTO: 2.9 % (ref 5–12)
NEUTROPHILS # BLD AUTO: 17.54 10*3/MM3 (ref 1.7–7)
NEUTROPHILS NFR BLD AUTO: 91.4 % (ref 42.7–76)
NRBC BLD AUTO-RTO: 0 /100 WBC (ref 0–0.2)
PLATELET # BLD AUTO: 339 10*3/MM3 (ref 140–450)
PMV BLD AUTO: 11 FL (ref 6–12)
POTASSIUM BLD-SCNC: 4.6 MMOL/L (ref 3.5–5.2)
PROCALCITONIN SERPL-MCNC: 1.42 NG/ML (ref 0.1–0.25)
PROT SERPL-MCNC: 7.1 G/DL (ref 6–8.5)
RBC # BLD AUTO: 3.86 10*6/MM3 (ref 3.77–5.28)
SODIUM BLD-SCNC: 135 MMOL/L (ref 136–145)
WBC NRBC COR # BLD: 19.21 10*3/MM3 (ref 3.4–10.8)

## 2020-04-06 PROCEDURE — 83520 IMMUNOASSAY QUANT NOS NONAB: CPT | Performed by: INTERNAL MEDICINE

## 2020-04-06 PROCEDURE — 83615 LACTATE (LD) (LDH) ENZYME: CPT | Performed by: INTERNAL MEDICINE

## 2020-04-06 PROCEDURE — 25010000002 ENOXAPARIN PER 10 MG: Performed by: FAMILY MEDICINE

## 2020-04-06 PROCEDURE — 82962 GLUCOSE BLOOD TEST: CPT

## 2020-04-06 PROCEDURE — 80053 COMPREHEN METABOLIC PANEL: CPT | Performed by: INTERNAL MEDICINE

## 2020-04-06 PROCEDURE — 87635 SARS-COV-2 COVID-19 AMP PRB: CPT | Performed by: INTERNAL MEDICINE

## 2020-04-06 PROCEDURE — 85025 COMPLETE CBC W/AUTO DIFF WBC: CPT | Performed by: INTERNAL MEDICINE

## 2020-04-06 PROCEDURE — 63710000001 INSULIN LISPRO (HUMAN) PER 5 UNITS: Performed by: FAMILY MEDICINE

## 2020-04-06 PROCEDURE — 86140 C-REACTIVE PROTEIN: CPT | Performed by: INTERNAL MEDICINE

## 2020-04-06 PROCEDURE — 85379 FIBRIN DEGRADATION QUANT: CPT | Performed by: INTERNAL MEDICINE

## 2020-04-06 PROCEDURE — 82728 ASSAY OF FERRITIN: CPT | Performed by: INTERNAL MEDICINE

## 2020-04-06 PROCEDURE — 84145 PROCALCITONIN (PCT): CPT | Performed by: INTERNAL MEDICINE

## 2020-04-06 RX ORDER — HYDROXYCHLOROQUINE SULFATE 200 MG/1
200 TABLET, FILM COATED ORAL EVERY 12 HOURS SCHEDULED
Status: COMPLETED | OUTPATIENT
Start: 2020-04-07 | End: 2020-04-11

## 2020-04-06 RX ORDER — HYDROXYCHLOROQUINE SULFATE 200 MG/1
400 TABLET, FILM COATED ORAL EVERY 12 HOURS SCHEDULED
Status: COMPLETED | OUTPATIENT
Start: 2020-04-06 | End: 2020-04-07

## 2020-04-06 RX ADMIN — ACETAMINOPHEN 650 MG: 325 TABLET, FILM COATED ORAL at 15:24

## 2020-04-06 RX ADMIN — INSULIN LISPRO 3 UNITS: 100 INJECTION, SOLUTION INTRAVENOUS; SUBCUTANEOUS at 08:21

## 2020-04-06 RX ADMIN — SODIUM CHLORIDE, PRESERVATIVE FREE 10 ML: 5 INJECTION INTRAVENOUS at 08:05

## 2020-04-06 RX ADMIN — ENOXAPARIN SODIUM 30 MG: 30 INJECTION SUBCUTANEOUS at 17:01

## 2020-04-06 RX ADMIN — INSULIN LISPRO 3 UNITS: 100 INJECTION, SOLUTION INTRAVENOUS; SUBCUTANEOUS at 12:37

## 2020-04-06 RX ADMIN — INSULIN LISPRO 3 UNITS: 100 INJECTION, SOLUTION INTRAVENOUS; SUBCUTANEOUS at 20:51

## 2020-04-06 RX ADMIN — ACETAMINOPHEN 650 MG: 325 TABLET, FILM COATED ORAL at 08:24

## 2020-04-06 RX ADMIN — SODIUM CHLORIDE, PRESERVATIVE FREE 10 ML: 5 INJECTION INTRAVENOUS at 20:51

## 2020-04-06 RX ADMIN — INSULIN LISPRO 3 UNITS: 100 INJECTION, SOLUTION INTRAVENOUS; SUBCUTANEOUS at 17:01

## 2020-04-06 RX ADMIN — ACETAMINOPHEN 650 MG: 325 TABLET, FILM COATED ORAL at 03:00

## 2020-04-06 RX ADMIN — HYDROXYCHLOROQUINE SULFATE 400 MG: 200 TABLET ORAL at 20:51

## 2020-04-06 NOTE — PLAN OF CARE
Problem: Patient Care Overview  Goal: Plan of Care Review  Outcome: Ongoing (interventions implemented as appropriate)  Flowsheets (Taken 4/6/2020 7361)  Progress: no change  Plan of Care Reviewed With: patient  Note:   Patient has C/O generalized back discomfort, prn tylenol per orders. Patient sat up in chair for some of the shift. Patient desaturated  to 78%; breathing techniques utilized; O2 on 4L's saturations maintained in 90's. Frequent, non-productive cough noted. No appetite. Isolation precautions. Patient repositioned and pillows utilized to aide with patients gluteal redness/maroon discoloration; Z guard utilized. QTc monitoring per orders. Safety Maintained. Will continue to monitor.  Goal: Individualization and Mutuality  Outcome: Ongoing (interventions implemented as appropriate)  Goal: Discharge Needs Assessment  Outcome: Ongoing (interventions implemented as appropriate)  Goal: Interprofessional Rounds/Family Conf  Outcome: Ongoing (interventions implemented as appropriate)     Problem: Infection, Risk/Actual (Adult)  Goal: Identify Related Risk Factors and Signs and Symptoms  Outcome: Ongoing (interventions implemented as appropriate)  Goal: Infection Prevention/Resolution  Outcome: Ongoing (interventions implemented as appropriate)     Problem: Diabetes, Type 2 (Adult)  Goal: Signs and Symptoms of Listed Potential Problems Will be Absent, Minimized or Managed (Diabetes, Type 2)  Outcome: Ongoing (interventions implemented as appropriate)     Problem: Breathing Pattern Ineffective (Adult)  Goal: Identify Related Risk Factors and Signs and Symptoms  Outcome: Ongoing (interventions implemented as appropriate)  Goal: Effective Oxygenation/Ventilation  Outcome: Ongoing (interventions implemented as appropriate)  Goal: Anxiety/Fear Reduction  Outcome: Ongoing (interventions implemented as appropriate)

## 2020-04-06 NOTE — PLAN OF CARE
Problem: Patient Care Overview  Goal: Plan of Care Review  Outcome: Ongoing (interventions implemented as appropriate)  Flowsheets (Taken 4/6/2020 0421)  Progress: no change  Plan of Care Reviewed With: patient  Outcome Summary:     Pt complained of lower back pain, see MAR. Highest temp was 102.2, Tylenol given early per Dr. Aguilar. Plaquenil not given qtc >440. Voiding per BSC. On 4L of O2. Tele running sinus arhythmia with frequent PVCs per monitor room ranging in 70s-90s.Pt has dry cough frequently, especially with movement. Repositioned frequently and spent about 2 hours up in chair. ACCU checks ACHS with SS. Isolation precautions maintained. Will cont to monitor.     Dr Sawant called to check in on pt last night, he is aware of pts temp.

## 2020-04-06 NOTE — PROGRESS NOTES
Infectious Diseases Progress Note    Patient:  Gema Chapman  YOB: 1940  MRN: [id number]   Admit date: 4/4/2020   Admitting Physician: Jesse Kilgore MD  Primary Care Physician: Duy Hall MD    Chief Complaint/Interval History: She feels a little better than yesterday per her report.  She does desaturate and gets short of breath when she moves around the room.  She does cough some when she talks, but in watching her she does not cough as much as when I first saw her in the ER.  She had temperature earlier yesterday morning.  She was afebrile on rounds this morning when I saw her.  I did return this afternoon to reassess.  She had had some temperature elevation midday.  When I spoke with her this morning I asked her about her thoughts about using hydroxychloroquine.  I explained it is part of some institutions treatment algorithm, but that there was not great data to suggest it would definitely be helpful.  I reviewed with her that there is some risk with heart arrhythmias.  I explained we would do our best to monitor the electrical activity of her heart on the monitor.  She was comfortable answering questions appropriately with me this morning.  Nurse was present with her in the room.  She indicated she would leave it up to Dr. Kilgore and I use her best judgment.  She was okay trying the medicine or not trying the medicine.  She implied that she was comfortable passing away if it was her time.  She was not short of breath or in distress making that comment, she was merely verbalizing that mentally prepared to pass.    Intake/Output Summary (Last 24 hours) at 4/6/2020 0637  Last data filed at 4/5/2020 1853  Gross per 24 hour   Intake 500 ml   Output --   Net 500 ml     Allergies: No Known Allergies  Current Scheduled Medications:     enoxaparin 30 mg Subcutaneous Q24H   hydroxychloroquine 200 mg Oral Q12H   insulin lispro 2-7 Units Subcutaneous 4x Daily With Meals & Nightly   sodium  "chloride 10 mL Intravenous Q12H     Current PRN Medications:  •  acetaminophen **OR** acetaminophen **OR** acetaminophen  •  dextrose  •  dextrose  •  glucagon (human recombinant)  •  ondansetron  •  sodium chloride    Review of Systems see HPI    Vital Signs:  /64 (BP Location: Right arm, Patient Position: Lying)   Pulse 73   Temp 98.8 °F (37.1 °C) (Oral)   Resp 25   Ht 167.6 cm (66\")   Wt 95 kg (209 lb 8 oz)   SpO2 96%   BMI 33.81 kg/m²     Physical Exam  Vital signs - reviewed.    General she looks comfortable at rest.  She was not dyspneic at rest.  She was oriented, conversant, and answering questions appropriately.  She did not cough today during our interview.  She overall looks stronger.    Lab Results:  CBC: Results from last 7 days   Lab Units 04/05/20  1045 04/04/20  1105   WBC 10*3/mm3 18.77* 15.82*   HEMOGLOBIN g/dL 11.0* 11.5*   HEMATOCRIT % 32.3* 34.1   PLATELETS 10*3/mm3 340 297     BMP:  Results from last 7 days   Lab Units 04/05/20  1045 04/04/20  1105   SODIUM mmol/L 134* 132*   POTASSIUM mmol/L 4.3 4.3   CHLORIDE mmol/L 100 96*   CO2 mmol/L 20.0* 21.0*   BUN mg/dL 42* 51*   CREATININE mg/dL 1.74* 2.26*   GLUCOSE mg/dL 241* 226*   CALCIUM mg/dL 9.1 9.2   ALT (SGPT) U/L 445* 183*     Liver assessment test today-, , alkaline phosphatase 151, total bilirubin 0.9  Creatinine today 2.1    D-dimer 3.6    Procalcitonin 1.4  Ferritin 3900    Culture Results:   Blood Culture   Date Value Ref Range Status   04/04/2020 No growth at 24 hours  Preliminary   04/04/2020 No growth at 24 hours  Preliminary     Radiology: None    Additional Studies Reviewed: None    Impression:   1.  Fever, fatigue, cough, elevated AST/ALT and positive COVID-19 testing.  Suspect COVID-19 pneumonia.  Current baseline QTC right at 480 ms.  Discussed with Dr. Kilgore.  She has a lot of laboratory markers for poor outcome.  Patient is comfortable with either approach.  Per discussion with Dr. Kilgore " we have elected a trial of hydroxychloroquine treatment.  2.  Acute on chronic renal failure-stable  3.  Diabetes mellitus  4.  History hypertension    Recommendations:   Begin hydroxychloroquine 4 mg orally every 12 hours  Repeat QTc corrected off telemetry in 4 hours  Will discontinue hydroxychloroquine if QTc interval increases to greater than or equal to 500 ms  Continue to follow    Drew Castaneda MD

## 2020-04-07 PROBLEM — E66.9 OBESITY (BMI 30-39.9): Status: ACTIVE | Noted: 2020-04-07

## 2020-04-07 PROBLEM — R74.02 ELEVATED LDH: Status: ACTIVE | Noted: 2020-04-07

## 2020-04-07 PROBLEM — R79.89 ELEVATED FERRITIN: Status: ACTIVE | Noted: 2020-04-07

## 2020-04-07 PROBLEM — R79.89 ELEVATED LFTS: Status: ACTIVE | Noted: 2020-04-07

## 2020-04-07 LAB
GLUCOSE BLDC GLUCOMTR-MCNC: 248 MG/DL (ref 70–130)
GLUCOSE BLDC GLUCOMTR-MCNC: 282 MG/DL (ref 70–130)
GLUCOSE BLDC GLUCOMTR-MCNC: 289 MG/DL (ref 70–130)
GLUCOSE BLDC GLUCOMTR-MCNC: 319 MG/DL (ref 70–130)
SARS-COV-2 RNA RESP QL NAA+PROBE: DETECTED

## 2020-04-07 PROCEDURE — 25010000002 ENOXAPARIN PER 10 MG: Performed by: FAMILY MEDICINE

## 2020-04-07 PROCEDURE — 63710000001 INSULIN LISPRO (HUMAN) PER 5 UNITS: Performed by: FAMILY MEDICINE

## 2020-04-07 PROCEDURE — 82962 GLUCOSE BLOOD TEST: CPT

## 2020-04-07 RX ORDER — ZINC GLUCONATE 50 MG
100 TABLET ORAL DAILY
Status: DISCONTINUED | OUTPATIENT
Start: 2020-04-07 | End: 2020-04-21 | Stop reason: HOSPADM

## 2020-04-07 RX ORDER — FUROSEMIDE 10 MG/ML
40 INJECTION INTRAMUSCULAR; INTRAVENOUS ONCE
Status: DISCONTINUED | OUTPATIENT
Start: 2020-04-07 | End: 2020-04-07

## 2020-04-07 RX ADMIN — HYDROXYCHLOROQUINE SULFATE 400 MG: 200 TABLET ORAL at 08:52

## 2020-04-07 RX ADMIN — ACETAMINOPHEN 650 MG: 325 TABLET, FILM COATED ORAL at 08:53

## 2020-04-07 RX ADMIN — INSULIN LISPRO 4 UNITS: 100 INJECTION, SOLUTION INTRAVENOUS; SUBCUTANEOUS at 09:03

## 2020-04-07 RX ADMIN — SODIUM CHLORIDE, PRESERVATIVE FREE 10 ML: 5 INJECTION INTRAVENOUS at 21:06

## 2020-04-07 RX ADMIN — INSULIN LISPRO 5 UNITS: 100 INJECTION, SOLUTION INTRAVENOUS; SUBCUTANEOUS at 12:19

## 2020-04-07 RX ADMIN — ENOXAPARIN SODIUM 30 MG: 30 INJECTION SUBCUTANEOUS at 17:35

## 2020-04-07 RX ADMIN — ACETAMINOPHEN 650 MG: 325 TABLET, FILM COATED ORAL at 00:25

## 2020-04-07 RX ADMIN — INSULIN LISPRO 4 UNITS: 100 INJECTION, SOLUTION INTRAVENOUS; SUBCUTANEOUS at 17:41

## 2020-04-07 RX ADMIN — HYDROXYCHLOROQUINE SULFATE 200 MG: 200 TABLET, FILM COATED ORAL at 20:55

## 2020-04-07 RX ADMIN — SODIUM CHLORIDE, PRESERVATIVE FREE 10 ML: 5 INJECTION INTRAVENOUS at 08:54

## 2020-04-07 RX ADMIN — Medication 100 MG: at 17:36

## 2020-04-07 RX ADMIN — INSULIN LISPRO 5 UNITS: 100 INJECTION, SOLUTION INTRAVENOUS; SUBCUTANEOUS at 20:55

## 2020-04-07 NOTE — PLAN OF CARE
Problem: Patient Care Overview  Goal: Plan of Care Review  Outcome: Ongoing (interventions implemented as appropriate)  Flowsheets  Taken 4/7/2020 0403  Progress: no change  Outcome Summary: Pt spiked fever of 101.7; PRN tylenol given. First dose of plaquenil given, QT interval monitored and remained less than 500. Remains on 4L O2 sats in the low-mid 90s and desats with activity. Sat up in chair for a few hours tonight. Accucheks AC/HS 3 units given per SS. Pt still has dry cough. No c/o pain. Turned frewuently using pillows as support. Report given to Cathryn SAM at 0400 for remainder of shift. Isolation precautions maintained. Safety maintained. Will continue to monitor.  Taken 4/6/2020 2045  Plan of Care Reviewed With: patient

## 2020-04-07 NOTE — PROGRESS NOTES
[facility] Medicine Services  INPATIENT PROGRESS NOTE    Patient Name: Gema Chapman  Date of Admission: 4/4/2020  Today's Date: 04/06/20  Length of Stay: 2  Primary Care Physician: Duy Hall MD    Subjective   Chief Complaint: shortness of breath  HPI     Patient was examined at bedside.  Patient continues to be febrile with a T-max of 101.5.  Patient states that she does not feelshort of breath, however she is on anywhere from 3 to 6 L.  Patient denies any nausea vomiting..  Patient cannot take.        Review of Systems   Constitutional: Positive for chills, fatigue and fever. Negative for activity change, appetite change and diaphoresis.   Respiratory: Positive for cough and shortness of breath.    Cardiovascular: Positive for leg swelling. Negative for chest pain and palpitations.   Gastrointestinal: Negative for abdominal distention, abdominal pain, diarrhea, nausea and vomiting.        All pertinent negatives and positives are as above. All other systems have been reviewed and are negative unless otherwise stated.     Objective    Temp:  [98.7 °F (37.1 °C)-101.5 °F (38.6 °C)] 99.4 °F (37.4 °C)  Heart Rate:  [73-88] 80  Resp:  [19-34] 32  BP: (108-170)/(48-68) 145/59  Physical Exam   Constitutional: She is oriented to person, place, and time. No distress.   HENT:   Head: Normocephalic and atraumatic.   Eyes: Conjunctivae are normal. No scleral icterus.   Neck: Neck supple.   Cardiovascular: Normal rate and regular rhythm.   Review of telemetry    Pulmonary/Chest: Effort normal. No respiratory distress.   Musculoskeletal: She exhibits edema.   Neurological: She is alert and oriented to person, place, and time.   Skin: She is diaphoretic. No erythema. There is pallor.   Psychiatric: She has a normal mood and affect. Her behavior is normal.   Nursing note and vitals reviewed.      This exam was performed remotely in the unit aided by real-time audio/visual  communication tools and with assistance from nursing staff.        Results Review:  I have reviewed the labs, radiology results, and diagnostic studies.    Laboratory Data:   Results from last 7 days   Lab Units 04/06/20  0813 04/05/20  1045 04/04/20  1105   WBC 10*3/mm3 19.21* 18.77* 15.82*   HEMOGLOBIN g/dL 12.1 11.0* 11.5*   HEMATOCRIT % 35.6 32.3* 34.1   PLATELETS 10*3/mm3 339 340 297        Results from last 7 days   Lab Units 04/06/20  0813 04/05/20  1045 04/04/20  1105   SODIUM mmol/L 135* 134* 132*   POTASSIUM mmol/L 4.6 4.3 4.3   CHLORIDE mmol/L 99 100 96*   CO2 mmol/L 21.0* 20.0* 21.0*   BUN mg/dL 51* 42* 51*   CREATININE mg/dL 2.09* 1.74* 2.26*   CALCIUM mg/dL 9.4 9.1 9.2   BILIRUBIN mg/dL 0.9 0.8 0.7   ALK PHOS U/L 151* 130* 92   ALT (SGPT) U/L 414* 445* 183*   AST (SGOT) U/L 259* 444* 212*   GLUCOSE mg/dL 231* 241* 226*       Culture Data:   Blood Culture   Date Value Ref Range Status   04/04/2020 No growth at 2 days  Preliminary   04/04/2020 No growth at 2 days  Preliminary       Radiology Data:   Imaging Results (Last 24 Hours)     ** No results found for the last 24 hours. **          I have reviewed the patient's current medications.     Assessment/Plan     Active Hospital Problems    Diagnosis   • Obesity (BMI 30-39.9)   • Elevated LFTs   • Elevated LDH   • Elevated ferritin   • CKD (chronic kidney disease) stage 4, GFR 15-29 ml/min (CMS/HCC)   • Febrile illness   • COVID-19 virus detected   • Type 2 diabetes mellitus (CMS/HCC)       Plan:  1.  Consulted ID, discussed with Dr. Sawant  2.  Went ahead and started hydroxychloroquine 400 mg BID on day 1, followed by 200 mg BID for 4 days  3.  QTc 480 today, monitor closely  4.  Sliding scale insulin with qac/qhs accuchecks  5.  Supportive care   6.  Fluid restrictive strategy   7.  AM lab holidays, labs on 4/8  8.  Lovenox 30 mg q24h              Discharge Planning: I expect the patient to be discharged to ? in ? days.    Jesse Kilgore MD      04/06/20   22:14     COVID-19 Evaluation    Presentation:       fever, shortness of breath and cough      Risk Factors:      personal exposure to COVID-19 suspected person      Personal Review and Interpretation of Imaging:   CXR poor inspiratory quality central opacities with possible mild peripheral opacities      Laboratory Evaluation:  Lab Results   Component Value Date    WBC 19.21 (H) 04/06/2020    WBC 18.77 (H) 04/05/2020    LYMPHORELPCT 4.3 (L) 04/06/2020    LYMPHORELPCT 5.0 (L) 04/05/2020    LYMPHSABS 0.83 04/06/2020    LYMPHSABS 0.93 04/05/2020     (H) 04/06/2020     (H) 04/05/2020    CRP 23.83 (H) 04/06/2020    CRP 20.34 (H) 04/05/2020    PROCALCITO 1.42 (H) 04/06/2020    PROCALCITO 0.69 (H) 04/04/2020    FERRITIN 3,900.00 (H) 04/06/2020    FERRITIN 4,467.00 (H) 04/05/2020    DDIMER 3.64 (H) 04/06/2020    DDIMER 3.25 (H) 04/05/2020     (H) 04/06/2020     (H) 04/05/2020     (H) 04/06/2020     (H) 04/05/2020       Respiratory Panel:  No results found for: ADENOVIRUS, FT635U, CVHKU1, CVNL63, CVOC43, HUMETPNEVS, HURVEV, FLUBPCR, PARAINFLUE, PARAFLUV2, PARAFLUV3, PARAFLUV4, BPERTPCR, [id number], CPNEUPCR, MPNEUMO, FLUAPCR, FLUAH3, FLUAH1, RSV, BPARAPCR    Other Microbiological work-up:    Lab Results   Component Value Date    BLOODCX No growth at 2 days 04/04/2020    BLOODCX No growth at 2 days 04/04/2020       COVID-19 Testing: was sent to in house lab, supposedly OSH check but unable to find result    Examination:  assessed using appropriate electronic equipment      Clinical Suspicion:  high risk of COVID-19 infection      COVID-19 Result  No results found for: COVID19

## 2020-04-07 NOTE — PROGRESS NOTES
"Infectious Diseases Progress Note    Patient:  Gema Chapman  YOB: 1940  MRN: [id number]   Admit date: 4/4/2020   Admitting Physician: Jesse Kilgore MD  Primary Care Physician: Duy Hall MD    Chief Complaint/Interval History: She reports she is feeling better.  She does not feel as achy today she did yesterday.  She does not report any chills or night sweats.  Her appetite seems marginal.  She does not desaturate at rest.  She will desaturate with exertion into the high 70s.  It takes a while for her to recover.  She is not having any abdominal pain, nausea, or diarrhea.  She has no pain at her IV site.  She does not describe any urinary tract complaints.  Her maximum temperature earlier today was shortly after midnight and was 101.7.  She has been afebrile subsequently.  Her QT corrected on telemetry was 462 ms.    Intake/Output Summary (Last 24 hours) at 4/7/2020 1750  Last data filed at 4/7/2020 1200  Gross per 24 hour   Intake 150 ml   Output --   Net 150 ml     Allergies: No Known Allergies  Current Scheduled Medications:     enoxaparin 30 mg Subcutaneous Q24H   hydroxychloroquine 200 mg Oral Q12H   insulin lispro 2-7 Units Subcutaneous 4x Daily With Meals & Nightly   sodium chloride 10 mL Intravenous Q12H   zinc gluconate 100 mg Oral Daily     Current PRN Medications:  •  acetaminophen **OR** acetaminophen **OR** acetaminophen  •  dextrose  •  dextrose  •  glucagon (human recombinant)  •  ondansetron  •  sodium chloride    Review of Systems    Vital Signs:  /57 (BP Location: Right arm, Patient Position: Lying)   Pulse 69   Temp 97.8 °F (36.6 °C) (Oral)   Resp (!) 31   Ht 167.6 cm (66\")   Wt 95 kg (209 lb 8 oz)   SpO2 94%   BMI 33.81 kg/m²     Physical Exam  Vital signs - reviewed.  Visit today performed via telemedicine using iPad device on the floor.  Her IV site was visualized and showed no erythema, swelling, or cellulitis  In general she was smiling and " interactive.  She looks stable in comparison to yesterday's exam.  She did not appear to be dyspneic or tachypneic at rest.    Lab Results:  CBC: Results from last 7 days   Lab Units 04/06/20  0813 04/05/20  1045 04/04/20  1105   WBC 10*3/mm3 19.21* 18.77* 15.82*   HEMOGLOBIN g/dL 12.1 11.0* 11.5*   HEMATOCRIT % 35.6 32.3* 34.1   PLATELETS 10*3/mm3 339 340 297     BMP:  Results from last 7 days   Lab Units 04/06/20  0813 04/05/20  1045 04/04/20  1105   SODIUM mmol/L 135* 134* 132*   POTASSIUM mmol/L 4.6 4.3 4.3   CHLORIDE mmol/L 99 100 96*   CO2 mmol/L 21.0* 20.0* 21.0*   BUN mg/dL 51* 42* 51*   CREATININE mg/dL 2.09* 1.74* 2.26*   GLUCOSE mg/dL 231* 241* 226*   CALCIUM mg/dL 9.4 9.1 9.2   ALT (SGPT) U/L 414* 445* 183*     Culture Results:   Blood Culture   Date Value Ref Range Status   04/04/2020 No growth at 3 days  Preliminary   04/04/2020 No growth at 3 days  Preliminary     Radiology: None  Additional Studies Reviewed: None    Impression:   1.  Fever, fatigue, cough, elevated AST/ALT-COVID-19 pneumonia.  2.  Acute on chronic renal failure-stable  3.  Diabetes mellitus  4.  Hypertension    Recommendations:   She is holding her own at this point.  She does not seem to be improving much at present.  Subjectively she feels a little bit better, but objectively feels she is remaining about the same.  Does not appear to have toxicity with hydroxychloroquine treatment at this point  Continue supportive care  Continue to follow    Drew Castaneda MD

## 2020-04-07 NOTE — PLAN OF CARE
Problem: Patient Care Overview  Goal: Plan of Care Review  Outcome: Ongoing (interventions implemented as appropriate)  Flowsheets (Taken 4/7/2020 9997)  Progress: improving  Plan of Care Reviewed With: patient  Outcome Summary: Took over care of patient at 0400. Pt wearing o2 at 4L nasal cannula, desats with activity. Pt has had no c/o pain. Turned as tolerated,  up in chair currently. Pt had a liquid BM this am. Isolation precautions maintianed. Continue to monitor.

## 2020-04-07 NOTE — PROGRESS NOTES
[facility] Medicine Services  INPATIENT PROGRESS NOTE    Patient Name: Gema Chapman  Date of Admission: 4/4/2020  Today's Date: 04/07/20  Length of Stay: 3  Primary Care Physician: Duy Hall MD    Subjective   Chief Complaint: shortness of breath  HPI     Patient was seen and examined.  Telehealth was used.  Patient overall is doing well.  Patient indicates that she lives with her daughter and plans to return to this.  Patient was noted to have a T-max 101.7.  Patient is on 4 L nasal cannula saturating with an O2 sat of 92%.        Review of Systems   Constitutional: Positive for chills and fatigue. Negative for activity change, appetite change and diaphoresis.   Respiratory: Positive for shortness of breath. Negative for cough.    Cardiovascular: Positive for leg swelling. Negative for palpitations.   Gastrointestinal: Negative for abdominal distention.        All pertinent negatives and positives are as above. All other systems have been reviewed and are negative unless otherwise stated.     Objective    Temp:  [97.2 °F (36.2 °C)-101.7 °F (38.7 °C)] 97.2 °F (36.2 °C)  Heart Rate:  [59-97] 59  Resp:  [19-33] 27  BP: ()/(48-59) 99/57  Physical Exam   Constitutional: She is oriented to person, place, and time. No distress.   HENT:   Head: Normocephalic and atraumatic.   Eyes: Conjunctivae are normal. No scleral icterus.   Neck: Neck supple.   Cardiovascular: Normal rate and regular rhythm.   Review of telemetry    Pulmonary/Chest: Effort normal. No respiratory distress.   Musculoskeletal: She exhibits edema.   Neurological: She is alert and oriented to person, place, and time.   Skin: She is not diaphoretic. No erythema. There is pallor.   Psychiatric: She has a normal mood and affect. Her behavior is normal.   Nursing note and vitals reviewed.      This exam was performed remotely in the unit aided by real-time audio/visual communication tools and with  assistance from nursing staff.        Results Review:  I have reviewed the labs, radiology results, and diagnostic studies.    Laboratory Data:   Results from last 7 days   Lab Units 04/06/20  0813 04/05/20  1045 04/04/20  1105   WBC 10*3/mm3 19.21* 18.77* 15.82*   HEMOGLOBIN g/dL 12.1 11.0* 11.5*   HEMATOCRIT % 35.6 32.3* 34.1   PLATELETS 10*3/mm3 339 340 297        Results from last 7 days   Lab Units 04/06/20  0813 04/05/20  1045 04/04/20  1105   SODIUM mmol/L 135* 134* 132*   POTASSIUM mmol/L 4.6 4.3 4.3   CHLORIDE mmol/L 99 100 96*   CO2 mmol/L 21.0* 20.0* 21.0*   BUN mg/dL 51* 42* 51*   CREATININE mg/dL 2.09* 1.74* 2.26*   CALCIUM mg/dL 9.4 9.1 9.2   BILIRUBIN mg/dL 0.9 0.8 0.7   ALK PHOS U/L 151* 130* 92   ALT (SGPT) U/L 414* 445* 183*   AST (SGOT) U/L 259* 444* 212*   GLUCOSE mg/dL 231* 241* 226*       Culture Data:   Blood Culture   Date Value Ref Range Status   04/04/2020 No growth at 2 days  Preliminary   04/04/2020 No growth at 2 days  Preliminary       Radiology Data:   Imaging Results (Last 24 Hours)     ** No results found for the last 24 hours. **          I have reviewed the patient's current medications.     Assessment/Plan     Active Hospital Problems    Diagnosis   • Obesity (BMI 30-39.9)   • Elevated LFTs   • Elevated LDH   • Elevated ferritin   • CKD (chronic kidney disease) stage 4, GFR 15-29 ml/min (CMS/HCC)   • Febrile illness   • COVID-19 virus detected   • Type 2 diabetes mellitus (CMS/HCC)       Plan:  1.  Consulted ID, discussed with Dr. Sawant  2.  Continue hydroxychloroquine 400 mg BID on day 1, followed by 200 mg BID for 4 days  3.  QTc 459 today, monitor closely  4.  Sliding scale insulin with qac/qhs accuchecks  5.  Supportive care   6.  Fluid restrictive strategy   7.  AM labs  8.  Lovenox 30 mg q24h  9.  Started zinc, 100 mg daily, this is based on anecdotal evidence, but little harm associated with treatment.              Discharge Planning: I expect the patient to be discharged  to ? in ? days.    Jesse Kilgore MD   04/07/20   15:47     COVID-19 Evaluation    Presentation:       fever, shortness of breath and cough      Risk Factors:      personal exposure to COVID-19 suspected person      Personal Review and Interpretation of Imaging:   CXR poor inspiratory quality central opacities with possible mild peripheral opacities      Laboratory Evaluation:  Lab Results   Component Value Date    WBC 19.21 (H) 04/06/2020    WBC 18.77 (H) 04/05/2020    LYMPHORELPCT 4.3 (L) 04/06/2020    LYMPHORELPCT 5.0 (L) 04/05/2020    LYMPHSABS 0.83 04/06/2020    LYMPHSABS 0.93 04/05/2020     (H) 04/06/2020     (H) 04/05/2020    CRP 23.83 (H) 04/06/2020    CRP 20.34 (H) 04/05/2020    PROCALCITO 1.42 (H) 04/06/2020    PROCALCITO 0.69 (H) 04/04/2020    FERRITIN 3,900.00 (H) 04/06/2020    FERRITIN 4,467.00 (H) 04/05/2020    DDIMER 3.64 (H) 04/06/2020    DDIMER 3.25 (H) 04/05/2020     (H) 04/06/2020     (H) 04/05/2020     (H) 04/06/2020     (H) 04/05/2020       Respiratory Panel:  No results found for: ADENOVIRUS, ID247O, CVHKU1, CVNL63, CVOC43, HUMETPNEVS, HURVEV, FLUBPCR, PARAINFLUE, PARAFLUV2, PARAFLUV3, PARAFLUV4, BPERTPCR, [id number], CPNEUPCR, MPNEUMO, FLUAPCR, FLUAH3, FLUAH1, RSV, BPARAPCR    Other Microbiological work-up:    Lab Results   Component Value Date    BLOODCX No growth at 3 days 04/04/2020    BLOODCX No growth at 3 days 04/04/2020       COVID-19 Testing: was sent to in house lab, supposedly OSH check but unable to find result    Examination:  assessed using appropriate electronic equipment      Clinical Suspicion:  high risk of COVID-19 infection      COVID-19 Result  COVID19   Date Value Ref Range Status   04/06/2020 Detected (C) Not Detected Final

## 2020-04-08 LAB
ALBUMIN SERPL-MCNC: 2.4 G/DL (ref 3.5–5.2)
ALBUMIN/GLOB SERPL: 0.6 G/DL
ALP SERPL-CCNC: 139 U/L (ref 39–117)
ALT SERPL W P-5'-P-CCNC: 261 U/L (ref 1–33)
ANION GAP SERPL CALCULATED.3IONS-SCNC: 14 MMOL/L (ref 5–15)
AST SERPL-CCNC: 164 U/L (ref 1–32)
BASOPHILS # BLD AUTO: 0.04 10*3/MM3 (ref 0–0.2)
BASOPHILS NFR BLD AUTO: 0.3 % (ref 0–1.5)
BILIRUB SERPL-MCNC: 0.5 MG/DL (ref 0.2–1.2)
BUN BLD-MCNC: 76 MG/DL (ref 8–23)
BUN/CREAT SERPL: 32.6 (ref 7–25)
CALCIUM SPEC-SCNC: 9.1 MG/DL (ref 8.6–10.5)
CHLORIDE SERPL-SCNC: 102 MMOL/L (ref 98–107)
CO2 SERPL-SCNC: 20 MMOL/L (ref 22–29)
CREAT BLD-MCNC: 2.33 MG/DL (ref 0.57–1)
DEPRECATED RDW RBC AUTO: 46.8 FL (ref 37–54)
EOSINOPHIL # BLD AUTO: 0.02 10*3/MM3 (ref 0–0.4)
EOSINOPHIL NFR BLD AUTO: 0.2 % (ref 0.3–6.2)
ERYTHROCYTE [DISTWIDTH] IN BLOOD BY AUTOMATED COUNT: 13.8 % (ref 12.3–15.4)
GFR SERPL CREATININE-BSD FRML MDRD: 20 ML/MIN/1.73
GLOBULIN UR ELPH-MCNC: 3.8 GM/DL
GLUCOSE BLD-MCNC: 233 MG/DL (ref 65–99)
GLUCOSE BLDC GLUCOMTR-MCNC: 221 MG/DL (ref 70–130)
GLUCOSE BLDC GLUCOMTR-MCNC: 226 MG/DL (ref 70–130)
GLUCOSE BLDC GLUCOMTR-MCNC: 235 MG/DL (ref 70–130)
GLUCOSE BLDC GLUCOMTR-MCNC: 243 MG/DL (ref 70–130)
HCT VFR BLD AUTO: 31.5 % (ref 34–46.6)
HGB BLD-MCNC: 10.6 G/DL (ref 12–15.9)
IL6 SERPL-MCNC: 149.2 PG/ML (ref 0–15.5)
IMM GRANULOCYTES # BLD AUTO: 0.22 10*3/MM3 (ref 0–0.05)
IMM GRANULOCYTES NFR BLD AUTO: 1.9 % (ref 0–0.5)
LYMPHOCYTES # BLD AUTO: 1.13 10*3/MM3 (ref 0.7–3.1)
LYMPHOCYTES NFR BLD AUTO: 9.5 % (ref 19.6–45.3)
MCH RBC QN AUTO: 30.9 PG (ref 26.6–33)
MCHC RBC AUTO-ENTMCNC: 33.7 G/DL (ref 31.5–35.7)
MCV RBC AUTO: 91.8 FL (ref 79–97)
MONOCYTES # BLD AUTO: 0.63 10*3/MM3 (ref 0.1–0.9)
MONOCYTES NFR BLD AUTO: 5.3 % (ref 5–12)
NEUTROPHILS # BLD AUTO: 9.84 10*3/MM3 (ref 1.7–7)
NEUTROPHILS NFR BLD AUTO: 82.8 % (ref 42.7–76)
NRBC BLD AUTO-RTO: 0 /100 WBC (ref 0–0.2)
PLATELET # BLD AUTO: 413 10*3/MM3 (ref 140–450)
PMV BLD AUTO: 10.3 FL (ref 6–12)
POTASSIUM BLD-SCNC: 4 MMOL/L (ref 3.5–5.2)
PROT SERPL-MCNC: 6.2 G/DL (ref 6–8.5)
RBC # BLD AUTO: 3.43 10*6/MM3 (ref 3.77–5.28)
SODIUM BLD-SCNC: 136 MMOL/L (ref 136–145)
WBC NRBC COR # BLD: 11.88 10*3/MM3 (ref 3.4–10.8)

## 2020-04-08 PROCEDURE — 80053 COMPREHEN METABOLIC PANEL: CPT | Performed by: INTERNAL MEDICINE

## 2020-04-08 PROCEDURE — 85025 COMPLETE CBC W/AUTO DIFF WBC: CPT | Performed by: INTERNAL MEDICINE

## 2020-04-08 PROCEDURE — 25010000002 FUROSEMIDE PER 20 MG: Performed by: INTERNAL MEDICINE

## 2020-04-08 PROCEDURE — 25010000002 ENOXAPARIN PER 10 MG: Performed by: FAMILY MEDICINE

## 2020-04-08 PROCEDURE — 63710000001 INSULIN LISPRO (HUMAN) PER 5 UNITS: Performed by: FAMILY MEDICINE

## 2020-04-08 PROCEDURE — 82962 GLUCOSE BLOOD TEST: CPT

## 2020-04-08 RX ORDER — ASPIRIN 81 MG/1
81 TABLET ORAL DAILY
COMMUNITY

## 2020-04-08 RX ORDER — GUAIFENESIN AND DEXTROMETHORPHAN HYDROBROMIDE 600; 30 MG/1; MG/1
1-2 TABLET, EXTENDED RELEASE ORAL 2 TIMES DAILY
COMMUNITY

## 2020-04-08 RX ORDER — FUROSEMIDE 10 MG/ML
40 INJECTION INTRAMUSCULAR; INTRAVENOUS ONCE
Status: COMPLETED | OUTPATIENT
Start: 2020-04-08 | End: 2020-04-08

## 2020-04-08 RX ORDER — ALLOPURINOL 300 MG/1
300 TABLET ORAL DAILY
COMMUNITY

## 2020-04-08 RX ORDER — ALBUTEROL SULFATE 90 UG/1
2 AEROSOL, METERED RESPIRATORY (INHALATION) 4 TIMES DAILY
COMMUNITY

## 2020-04-08 RX ADMIN — INSULIN LISPRO 3 UNITS: 100 INJECTION, SOLUTION INTRAVENOUS; SUBCUTANEOUS at 08:35

## 2020-04-08 RX ADMIN — SODIUM CHLORIDE, PRESERVATIVE FREE 10 ML: 5 INJECTION INTRAVENOUS at 08:38

## 2020-04-08 RX ADMIN — ENOXAPARIN SODIUM 30 MG: 30 INJECTION SUBCUTANEOUS at 17:20

## 2020-04-08 RX ADMIN — Medication 100 MG: at 08:35

## 2020-04-08 RX ADMIN — INSULIN LISPRO 3 UNITS: 100 INJECTION, SOLUTION INTRAVENOUS; SUBCUTANEOUS at 17:21

## 2020-04-08 RX ADMIN — HYDROXYCHLOROQUINE SULFATE 200 MG: 200 TABLET, FILM COATED ORAL at 20:37

## 2020-04-08 RX ADMIN — HYDROXYCHLOROQUINE SULFATE 200 MG: 200 TABLET, FILM COATED ORAL at 08:38

## 2020-04-08 RX ADMIN — FUROSEMIDE 40 MG: 10 INJECTION, SOLUTION INTRAVENOUS at 12:14

## 2020-04-08 RX ADMIN — INSULIN LISPRO 3 UNITS: 100 INJECTION, SOLUTION INTRAVENOUS; SUBCUTANEOUS at 12:00

## 2020-04-08 RX ADMIN — INSULIN LISPRO 4 UNITS: 100 INJECTION, SOLUTION INTRAVENOUS; SUBCUTANEOUS at 20:36

## 2020-04-08 NOTE — PLAN OF CARE
Problem: Patient Care Overview  Goal: Plan of Care Review  Outcome: Ongoing (interventions implemented as appropriate)  Flowsheets (Taken 4/8/2020 0310)  Plan of Care Reviewed With: patient  Note:   Denies pain this shift. Very SOA with activity. O2 at 5l. Turns self.

## 2020-04-08 NOTE — PLAN OF CARE
Problem: Patient Care Overview  Goal: Plan of Care Review  Outcome: Ongoing (interventions implemented as appropriate)  Flowsheets (Taken 4/8/2020 1817)  Progress: no change  Plan of Care Reviewed With: patient  Outcome Summary: No c/o of pain voiced this shift, pt on 6L O2 per nc, desats with any exertion, given 40mg Lasix IV, multiple incontinence episodes, pure wick now in place. Pt remained afebrile this shift, pt refused to get out of bed for any activity. MD notified, VSS, will continue to monitor.

## 2020-04-08 NOTE — PROGRESS NOTES
Continued Stay Note   [place]     Patient Name: Gema Chapman  MRN: [id number]  Today's Date: 4/8/2020    Admit Date: 4/4/2020    Discharge Plan     Row Name 04/08/20 1243       Plan    Plan  SNF vs home    Patient/Family in Agreement with Plan  yes    Plan Comments  Pt has RX coverage and a PCP. Pt is positive for COVID-19. Pt lived at home with her daughter, Tea, prior to admission. Tae does plan for pt to return home with her at discharge if possible. Tea has been educated that if pt is in need of SNF placement that pt will need to be quarantined for the suggested time prior to placement. Tea is also aware that SNF are admitting from home due to COVID-19. Pt has needed DME at home. Recommend physical therapy evaluation when medically appropriate.  SW will follow and assist with discharge needs        Discharge Codes    No documentation.             IVORY ParrishW

## 2020-04-08 NOTE — PROGRESS NOTES
"Infectious Diseases Progress Note    Patient:  Gema Chapman  YOB: 1940  MRN: [id number]   Admit date: 4/4/2020   Admitting Physician: Jesse Kilgore MD  Primary Care Physician: Duy Hall MD    Chief Complaint/Interval History: Presented to unit to see patient. Discussed with nursing.  Discussed with Dr. Kilgore.  She remains about the same.  She desaturates with exertion.  She does want to move much.  She feels tired.  She will indicate feels somewhat better.  She is on 6 L of oxygen.  Her oral intake is fairly minimal.  No diarrhea or rash.  Reviewed her telemetry strips.  Current QT corrected 405 ms.    Intake/Output Summary (Last 24 hours) at 4/8/2020 1510  Last data filed at 4/7/2020 1900  Gross per 24 hour   Intake 50 ml   Output --   Net 50 ml     Allergies: No Known Allergies  Current Scheduled Medications:     enoxaparin 30 mg Subcutaneous Q24H   hydroxychloroquine 200 mg Oral Q12H   insulin lispro 2-7 Units Subcutaneous 4x Daily With Meals & Nightly   sodium chloride 10 mL Intravenous Q12H   zinc gluconate 100 mg Oral Daily     Current PRN Medications:  •  acetaminophen **OR** acetaminophen **OR** acetaminophen  •  dextrose  •  dextrose  •  glucagon (human recombinant)  •  ondansetron  •  sodium chloride    Review of Systems not coughing much has not been itching.  Temperature curve is shown improvement.    Vital Signs:  /50   Pulse 64   Temp 98 °F (36.7 °C) (Oral)   Resp 27   Ht 167.6 cm (66\")   Wt 95 kg (209 lb 8 oz)   SpO2 (!) 89%   BMI 33.81 kg/m²     Physical Exam  Vital signs - reviewed.  Line/IV site - No erythema, warmth, induration, or tenderness.  Generalized when traffic and unnecessary exposure.  Reviewed physical exam with nursing and Dr. Kilgore.  Personal physical exam.    Lab Results:  CBC: Results from last 7 days   Lab Units 04/08/20  0802 04/06/20  0813 04/05/20  1045 04/04/20  1105   WBC 10*3/mm3 11.88* 19.21* 18.77* 15.82*   HEMOGLOBIN " g/dL 10.6* 12.1 11.0* 11.5*   HEMATOCRIT % 31.5* 35.6 32.3* 34.1   PLATELETS 10*3/mm3 413 339 340 297     BMP:  Results from last 7 days   Lab Units 04/08/20  0802 04/06/20  0813 04/05/20  1045 04/04/20  1105   SODIUM mmol/L 136 135* 134* 132*   POTASSIUM mmol/L 4.0 4.6 4.3 4.3   CHLORIDE mmol/L 102 99 100 96*   CO2 mmol/L 20.0* 21.0* 20.0* 21.0*   BUN mg/dL 76* 51* 42* 51*   CREATININE mg/dL 2.33* 2.09* 1.74* 2.26*   GLUCOSE mg/dL 233* 231* 241* 226*   CALCIUM mg/dL 9.1 9.4 9.1 9.2   ALT (SGPT) U/L 261* 414* 445* 183*     Culture Results:   Blood Culture   Date Value Ref Range Status   04/04/2020 No growth at 4 days  Preliminary   04/04/2020 No growth at 4 days  Preliminary     Radiology: None  Additional Studies Reviewed: None    Impression:   1.  Fever/fatigue//elevated AST/ALT-COVID-19 is positive.  She is holding her own.  She remains stable.  2.  Acute on chronic renal failure-stable  3.  Diabetes mellitus  4.  Hypertension    Recommendations:   Encouraging her to get up to chair more  Encouraging her to move more  Trying to help prevent development of secondary pneumonia  Continue supplemental oxygen  Complete course of hydroxychloroquine  Continue to follow  Lab in a.m.    Drew Castaneda MD

## 2020-04-09 LAB
ANION GAP SERPL CALCULATED.3IONS-SCNC: 13 MMOL/L (ref 5–15)
BACTERIA SPEC AEROBE CULT: NORMAL
BACTERIA SPEC AEROBE CULT: NORMAL
BUN BLD-MCNC: 68 MG/DL (ref 8–23)
BUN/CREAT SERPL: 35.6 (ref 7–25)
CALCIUM SPEC-SCNC: 9.2 MG/DL (ref 8.6–10.5)
CHLORIDE SERPL-SCNC: 105 MMOL/L (ref 98–107)
CO2 SERPL-SCNC: 22 MMOL/L (ref 22–29)
CREAT BLD-MCNC: 1.91 MG/DL (ref 0.57–1)
GFR SERPL CREATININE-BSD FRML MDRD: 25 ML/MIN/1.73
GLUCOSE BLD-MCNC: 215 MG/DL (ref 65–99)
GLUCOSE BLDC GLUCOMTR-MCNC: 194 MG/DL (ref 70–130)
GLUCOSE BLDC GLUCOMTR-MCNC: 197 MG/DL (ref 70–130)
GLUCOSE BLDC GLUCOMTR-MCNC: 202 MG/DL (ref 70–130)
GLUCOSE BLDC GLUCOMTR-MCNC: 241 MG/DL (ref 70–130)
MAGNESIUM SERPL-MCNC: 2.4 MG/DL (ref 1.6–2.4)
POTASSIUM BLD-SCNC: 3.9 MMOL/L (ref 3.5–5.2)
REF LAB TEST METHOD: ABNORMAL
SARS-COV-2 RNA RESP QL NAA+PROBE: DETECTED
SODIUM BLD-SCNC: 140 MMOL/L (ref 136–145)

## 2020-04-09 PROCEDURE — 63710000001 INSULIN LISPRO (HUMAN) PER 5 UNITS: Performed by: FAMILY MEDICINE

## 2020-04-09 PROCEDURE — 80048 BASIC METABOLIC PNL TOTAL CA: CPT | Performed by: INTERNAL MEDICINE

## 2020-04-09 PROCEDURE — 99497 ADVNCD CARE PLAN 30 MIN: CPT | Performed by: CLINICAL NURSE SPECIALIST

## 2020-04-09 PROCEDURE — 99223 1ST HOSP IP/OBS HIGH 75: CPT | Performed by: CLINICAL NURSE SPECIALIST

## 2020-04-09 PROCEDURE — 83735 ASSAY OF MAGNESIUM: CPT | Performed by: INTERNAL MEDICINE

## 2020-04-09 PROCEDURE — 82962 GLUCOSE BLOOD TEST: CPT

## 2020-04-09 PROCEDURE — 25010000002 ENOXAPARIN PER 10 MG: Performed by: FAMILY MEDICINE

## 2020-04-09 RX ORDER — MODAFINIL 100 MG/1
100 TABLET ORAL DAILY
Status: DISCONTINUED | OUTPATIENT
Start: 2020-04-09 | End: 2020-04-09

## 2020-04-09 RX ORDER — MODAFINIL 100 MG/1
100 TABLET ORAL DAILY
Status: DISPENSED | OUTPATIENT
Start: 2020-04-09 | End: 2020-04-19

## 2020-04-09 RX ORDER — FLUOXETINE 10 MG/1
10 CAPSULE ORAL DAILY
Status: DISCONTINUED | OUTPATIENT
Start: 2020-04-09 | End: 2020-04-09

## 2020-04-09 RX ADMIN — HYDROXYCHLOROQUINE SULFATE 200 MG: 200 TABLET, FILM COATED ORAL at 08:09

## 2020-04-09 RX ADMIN — SODIUM CHLORIDE, PRESERVATIVE FREE 10 ML: 5 INJECTION INTRAVENOUS at 20:36

## 2020-04-09 RX ADMIN — INSULIN LISPRO 2 UNITS: 100 INJECTION, SOLUTION INTRAVENOUS; SUBCUTANEOUS at 18:26

## 2020-04-09 RX ADMIN — INSULIN LISPRO 3 UNITS: 100 INJECTION, SOLUTION INTRAVENOUS; SUBCUTANEOUS at 20:41

## 2020-04-09 RX ADMIN — INSULIN LISPRO 2 UNITS: 100 INJECTION, SOLUTION INTRAVENOUS; SUBCUTANEOUS at 11:16

## 2020-04-09 RX ADMIN — HYDROXYCHLOROQUINE SULFATE 200 MG: 200 TABLET, FILM COATED ORAL at 20:36

## 2020-04-09 RX ADMIN — SODIUM CHLORIDE, PRESERVATIVE FREE 10 ML: 5 INJECTION INTRAVENOUS at 08:10

## 2020-04-09 RX ADMIN — INSULIN LISPRO 3 UNITS: 100 INJECTION, SOLUTION INTRAVENOUS; SUBCUTANEOUS at 08:09

## 2020-04-09 RX ADMIN — Medication 100 MG: at 08:09

## 2020-04-09 RX ADMIN — ENOXAPARIN SODIUM 30 MG: 30 INJECTION SUBCUTANEOUS at 18:22

## 2020-04-09 NOTE — PROGRESS NOTES
"Infectious Diseases Progress Note    Patient:  Gema Chapman  YOB: 1940  MRN: [id number]   Admit date: 4/4/2020   Admitting Physician: Jesse Kilgore MD  Primary Care Physician: Duy Hall MD    Chief Complaint/Interval History: Was able to see her via Video visit.  She seems to be coughing More today.  She looks tired.  She was sitting at bedside to get up to chair.  She remains on 6 L per nasal cannula.  Oxygen saturations at rest in low 90s.  Continues to desaturate with exercise.    No intake or output data in the 24 hours ending 04/09/20 1121  Allergies: No Known Allergies  Current Scheduled Medications:     enoxaparin 30 mg Subcutaneous Q24H   FLUoxetine 10 mg Oral Daily   hydroxychloroquine 200 mg Oral Q12H   insulin lispro 2-7 Units Subcutaneous 4x Daily With Meals & Nightly   modafinil 100 mg Oral Daily   sodium chloride 10 mL Intravenous Q12H   zinc gluconate 100 mg Oral Daily     Current PRN Medications:  •  acetaminophen **OR** acetaminophen **OR** acetaminophen  •  dextrose  •  dextrose  •  glucagon (human recombinant)  •  ondansetron  •  sodium chloride    Review of Systems    Vital Signs:  /53 (BP Location: Right arm)   Pulse 63   Temp 99 °F (37.2 °C)   Resp (!) 30   Ht 167.6 cm (66\")   Wt 95 kg (209 lb 8 oz)   SpO2 94%   BMI 33.81 kg/m²     Physical Exam  Vital signs - reviewed.  Video visit performed today.  To minimize unnecessary room traffic and exposures, exam discussed with nursing and hospitalist.  Personal direct exam not performed.  Watching her during the video she would cough intermittently when trying to speak.  She was tachypneic at rest.  She looked tired overall.  Exam about the same as yesterday.    Lab Results:  CBC: Results from last 7 days   Lab Units 04/08/20  0802 04/06/20  0813 04/05/20  1045 04/04/20  1105   WBC 10*3/mm3 11.88* 19.21* 18.77* 15.82*   HEMOGLOBIN g/dL 10.6* 12.1 11.0* 11.5*   HEMATOCRIT % 31.5* 35.6 32.3* 34.1 "   PLATELETS 10*3/mm3 413 339 340 297     BMP:  Results from last 7 days   Lab Units 04/09/20  0805 04/08/20  0802 04/06/20  0813 04/05/20  1045 04/04/20  1105   SODIUM mmol/L 140 136 135* 134* 132*   POTASSIUM mmol/L 3.9 4.0 4.6 4.3 4.3   CHLORIDE mmol/L 105 102 99 100 96*   CO2 mmol/L 22.0 20.0* 21.0* 20.0* 21.0*   BUN mg/dL 68* 76* 51* 42* 51*   CREATININE mg/dL 1.91* 2.33* 2.09* 1.74* 2.26*   GLUCOSE mg/dL 215* 233* 231* 241* 226*   CALCIUM mg/dL 9.2 9.1 9.4 9.1 9.2   ALT (SGPT) U/L  --  261* 414* 445* 183*     Culture Results:   Blood Culture   Date Value Ref Range Status   04/04/2020 No growth at 4 days  Preliminary   04/04/2020 No growth at 4 days  Preliminary     Radiology: None    Additional Studies Reviewed:   QT corrected via [id number]    Impression:   1.  COVID-19 pneumonia  2.  Chronic renal failure-stable  3.  Diabetes mellitus  4.  Hypertension    Recommendations:   She is holding her own but not making significant progress  She continues to desaturate with fairly minimal exertion  She remains stable at 6 L of oxygen, but looks very fatigued  Her QT corrected is borderline, but she seems to be able to tolerate hydroxychloroquine at present  Feel best we can offer at this point remains supportive care, oxygen supplementation, and completion of course of hydroxychloroquine treatment  Continue support  Continue to follow    Drew Castaneda MD

## 2020-04-09 NOTE — CONSULTS
"Palliative Care Initial Consult   Attending Physician: Jesse Kilgore MD  Referring Provider: Fara Farnsworth RN/Benny Kilgore MD    Reason for Referral: other symptom management, comfort care  Family/Support: No family at bedside  Goals of Care: TBD.  Code Status and Medical Interventions:   Ordered at: 04/04/20 1655     Limited Support to NOT Include:    Intubation     Level Of Support Discussed With:    Health Care Surrogate     Code Status:    No CPR     Medical Interventions (Level of Support Prior to Arrest):    Limited         HPI:   80 y.o. 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  Patient presented to [facility] on 4/4/2020 related to shortness of breath, cough, and confirmed COVID.  This exam was performed remotely in the unit aided by real-time audio/visual communication tools and with the assistance from nursing staff. Palliative Care Spoke With: patient reports her functional status at baseline is ambulatory with walker of which she has no difficulties.  She lives with her daughter and son-in-law who provide for any of her needs. When asked about cooking/cleaning etc. She verbalizes she does not participate in these activities \"they take care of me very well\". Due to the Palliative Care Topics Discussed: palliative care, goals of care, care options and resuscitation status we will establish an advance care plan.   Advance Care Planning   Advance Care Planning Discussion: Spoke with patient via real-time audio/visual communication.  Assessed overall understanding of patient's current health status and determined goals of care.  Patient admits that she has felt poorly and had little motivation over the last several days.  She feels that her motivation has improved as well as her appetite today.  She is willing to transfer to chair " and participate in rehab activities.  Her goals are to return to baseline of which she used a walker for ambulation without difficulty at home.  We further assessed CODE STATUS and care interventions.  Patient has elected no CPR, no intubation, no dialysis, and no cardioversion/defibrillation.  Based upon these choices and goals to transition to SNF after discharge we discussed implementing medical orders for scope of treatment (MOST) form to further delineate these care goals.  A copy of the decision aid and document have been provided for the patient to review.    Review of Systems   Constitution: Positive for decreased appetite and malaise/fatigue.   Cardiovascular: Positive for leg swelling.   Respiratory: Positive for cough and shortness of breath.    Hematologic/Lymphatic: Negative for bleeding problem.   Musculoskeletal: Positive for muscle weakness.   Gastrointestinal: Negative for constipation.   Genitourinary: Positive for bladder incontinence.   Neurological: Positive for weakness.   Psychiatric/Behavioral: The patient is not nervous/anxious.          Past Medical History:   Diagnosis Date   • Diabetes (CMS/HCC)    • Hyperlipemia    • Hypertension    • Renal disease      Past Surgical History:   Procedure Laterality Date   • CHOLECYSTECTOMY     • HIP ARTHROPLASTY Left      Social History     Socioeconomic History   • Marital status:      Spouse name: Not on file   • Number of children: Not on file   • Years of education: Not on file   • Highest education level: Not on file   Tobacco Use   • Smoking status: Never Smoker   • Smokeless tobacco: Never Used   Substance and Sexual Activity   • Alcohol use: No     Frequency: Never   • Drug use: Never       No Known Allergies    Current medication reviewed for route, type, dose and frequency and are current per MAR at time of dictation.      Diagnostics: Reviewed    Patient Active Problem List   Diagnosis   • Febrile illness   • COVID-19 virus detected   •  "Type 2 diabetes mellitus (CMS/HCC)   • CKD (chronic kidney disease) stage 4, GFR 15-29 ml/min (CMS/[facility])   • Obesity (BMI 30-39.9)   • Elevated LFTs   • Elevated LDH   • Elevated ferritin       Physical Exam:    /53 (BP Location: Right arm)   Pulse 63   Temp 99 °F (37.2 °C)   Resp (!) 30   Ht 167.6 cm (66\")   Wt 95 kg (209 lb 8 oz)   SpO2 94%   BMI 33.81 kg/m²      Physical Exam   Constitutional: She is oriented to person, place, and time. She appears well-developed and well-nourished. She has a sickly appearance. She appears ill. Nasal cannula in place.   HENT:   Head: Normocephalic and atraumatic.   Eyes: Lids are normal.   Neck: Neck supple.   Cardiovascular: Normal rate and regular rhythm.   Per cardiac monitor   Pulmonary/Chest: Effort normal. No respiratory distress.   Abdominal:   Decreased appetite   Genitourinary:   Genitourinary Comments: Incontinence.  External catheter in place.   Musculoskeletal: Normal range of motion.   Neurological: She is alert and oriented to person, place, and time.   Psychiatric: She has a normal mood and affect. Cognition and memory are normal.     Patient status: Disease state: Controlled with current treatments.  Functional status: Palliative Performance Scale Score: Performance 30% based on the following measures: Ambulation: Totally bed bound, Activity and Evidence of Disease: Unable to do any work, extensive evidence of disease, Self-Care: Total care required,  Intake: Reduced, LOC: Full, drowsy or confusion   Nutritional status: Albumin 2.40. Body mass index is 33.81 kg/m².         Family support: The patient receives support from her daughter and extended family..  POA/Healthcare surrogate-no advance directive on file    Impression/Problem List:    1.  COVID-19 virus detected  2.  Acute kidney injury on chronic kidney disease stage IV  3.  Type 2 diabetes mellitus  4.  Advanced age  5.  Hypertension  6.  Elevated LFTs  7.  Elevated LDH  8.  Elevated " ferritin  9.  Chronic microvascular disease, per CT head  10.  Neoplasm of parotid gland    Recommendations/Plan:  1. plan: Goals of care include CODE STATUS no CPR/limited interventions.      2.   Palliative care encounter  -CODE STATUS clarified as per conversation above.  -Discussed the MOST form.  A decision aid and copy of document has been provided.  -Plans for SNF for rehab.  We will have to await COVID negative status per SNF before acceptance in facility.  -Patient mood and appetite improved today.  Agrees to work toward goal of returning to baseline.  -We will follow as needed.      Thank you for this consult and allowing us to participate in patient's plan of care. Palliative Care Team will continue to follow patient.     Time spent: 75 minutes spent reviewing medical and medication records, assessing and examining patient, discussing with family, answering questions, providing some guidance about a plan and documentation of care, and coordinating care with other healthcare members, with > 50% time spent face to face.   20 minutes spent on advance care planning.    Maria Victoria Callaway, APRN  4/9/2020

## 2020-04-09 NOTE — PROGRESS NOTES
[facility] Medicine Services  INPATIENT PROGRESS NOTE    Patient Name: Gema Chapman  Date of Admission: 4/4/2020  Today's Date: 04/08/20  Length of Stay: 4  Primary Care Physician: Duy Hall MD    Subjective   Chief Complaint: shortness of breath  HPI     This exam was performed remotely in the unit aided by real-time audio/visual communication tools and with assistance from nursing staff.      Patient indicates she feels fine.  She has not been eating well.  TMax 100.2 over last 24 hours.  She is on 5-6 L NC pretty consistently throughout the day.  Patient has very little motivation, I encouraged her to get up to the chair but she is very resistant.  Very concerned that she continues to decline.        Review of Systems   Constitutional: Positive for appetite change, chills and fatigue. Negative for activity change, diaphoresis and fever.   Respiratory: Positive for cough and shortness of breath.    Cardiovascular: Positive for leg swelling. Negative for chest pain and palpitations.   Gastrointestinal: Negative for abdominal distention, abdominal pain, constipation, diarrhea, nausea and vomiting.        All pertinent negatives and positives are as above. All other systems have been reviewed and are negative unless otherwise stated.     Objective    Temp:  [98 °F (36.7 °C)-100.2 °F (37.9 °C)] 98.3 °F (36.8 °C)  Heart Rate:  [64-74] 66  Resp:  [27-33] 33  BP: (110-117)/(42-72) 110/56  Physical Exam   Constitutional: She is oriented to person, place, and time. No distress.   HENT:   Head: Normocephalic and atraumatic.   Eyes: Conjunctivae are normal. No scleral icterus.   Neck: Neck supple.   Cardiovascular: Normal rate and regular rhythm.   Review of telemetry    Pulmonary/Chest: Effort normal. No respiratory distress.   Musculoskeletal: She exhibits edema.   Neurological: She is alert and oriented to person, place, and time.   Skin: She is not diaphoretic. No  erythema. There is pallor.   Psychiatric: She has a normal mood and affect. Her behavior is normal.   Nursing note and vitals reviewed.      This exam was performed remotely in the unit aided by real-time audio/visual communication tools and with assistance from nursing staff.        Results Review:  I have reviewed the labs, radiology results, and diagnostic studies.    Laboratory Data:   Results from last 7 days   Lab Units 04/08/20  0802 04/06/20  0813 04/05/20  1045   WBC 10*3/mm3 11.88* 19.21* 18.77*   HEMOGLOBIN g/dL 10.6* 12.1 11.0*   HEMATOCRIT % 31.5* 35.6 32.3*   PLATELETS 10*3/mm3 413 339 340        Results from last 7 days   Lab Units 04/08/20  0802 04/06/20  0813 04/05/20  1045   SODIUM mmol/L 136 135* 134*   POTASSIUM mmol/L 4.0 4.6 4.3   CHLORIDE mmol/L 102 99 100   CO2 mmol/L 20.0* 21.0* 20.0*   BUN mg/dL 76* 51* 42*   CREATININE mg/dL 2.33* 2.09* 1.74*   CALCIUM mg/dL 9.1 9.4 9.1   BILIRUBIN mg/dL 0.5 0.9 0.8   ALK PHOS U/L 139* 151* 130*   ALT (SGPT) U/L 261* 414* 445*   AST (SGOT) U/L 164* 259* 444*   GLUCOSE mg/dL 233* 231* 241*       Culture Data:   Blood Culture   Date Value Ref Range Status   04/04/2020 No growth at 2 days  Preliminary   04/04/2020 No growth at 2 days  Preliminary       Radiology Data:   Imaging Results (Last 24 Hours)     ** No results found for the last 24 hours. **          I have reviewed the patient's current medications.     Assessment/Plan     Active Hospital Problems    Diagnosis   • **COVID-19 virus detected   • Obesity (BMI 30-39.9)   • Elevated LFTs   • Elevated LDH   • Elevated ferritin   • CKD (chronic kidney disease) stage 4, GFR 15-29 ml/min (CMS/HCC)   • Febrile illness   • Type 2 diabetes mellitus (CMS/HCC)       Plan:  1.  Consulted ID, discussed with Dr. Sawant  2.  Continue hydroxychloroquine 400 mg BID on day 1, followed by 200 mg BID for 4 days  3.  QTc reviewed.  Continue current therapy  4.  Sliding scale insulin with qac/qhs accuchecks  5.  Supportive  care   6.  Fluid restrictive strategy   7.  AM BMP and Mg  8.  Lovenox 30 mg q24h  9.  Continue zinc, 100 mg daily, this is based on anecdotal evidence, but little harm associated with treatment.  10.  Furosemide 40 mg IV x 1  11.  Palliative care consultation, concerned as patient continues to decline            Discharge Planning: I expect the patient to be discharged to ? in ? days.    Jesse Kilgore MD   04/08/20   20:20

## 2020-04-09 NOTE — SIGNIFICANT NOTE
Daughter Celena,retired APRN with [employer], called office today to discuss her Mother's care. Reviewed chart and updated her on status. She is frustrated in trying to obtain updates on Mom's progress.   Will add a low dose stimulate like Provigil, to see if helps motivation. Daughter feels she is getting depressed, will add low dose fluoxetine as well.

## 2020-04-09 NOTE — PROGRESS NOTES
[facility] Medicine Services  INPATIENT PROGRESS NOTE    Patient Name: Gema Chapman  Date of Admission: 4/4/2020  Today's Date: 04/09/20  Length of Stay: 5  Primary Care Physician: Duy Hall MD    Subjective   Chief Complaint: shortness of breath  HPI     This exam was performed remotely in the unit aided by real-time audio/visual communication tools and with assistance from nursing staff.      Patient is much brighter today and cheerful.  She indicates she is feeling some better today, she has been smiling more.  She is still requiring 6 L of nasal cannula still.  Patient willing to get up in the chair today.  We have discussed with PT who has given the nursing some exercises for the patient.  I have also tried to arrange with PT to have telehealth visits for strengthening as well.      Patient feels as though her breathing continues to improve.  But appetite is still poor.    Tmax 99F        Review of Systems   Constitutional: Positive for appetite change. Negative for activity change, chills, diaphoresis, fatigue and fever.   Respiratory: Negative for cough and shortness of breath.    Cardiovascular: Positive for leg swelling. Negative for palpitations.   Gastrointestinal: Negative for abdominal distention and constipation.        All pertinent negatives and positives are as above. All other systems have been reviewed and are negative unless otherwise stated.     Objective    Temp:  [98.3 °F (36.8 °C)-99 °F (37.2 °C)] 99 °F (37.2 °C)  Heart Rate:  [63-79] 63  Resp:  [27-33] 30  BP: (106-118)/(53-72) 106/53  Physical Exam   Constitutional: She is oriented to person, place, and time. No distress.   HENT:   Head: Normocephalic and atraumatic.   Eyes: Conjunctivae are normal. No scleral icterus.   Neck: Neck supple.   Cardiovascular: Normal rate and regular rhythm.   Review of telemetry    Pulmonary/Chest: Effort normal. No respiratory distress.   Musculoskeletal:  She exhibits edema.   Neurological: She is alert and oriented to person, place, and time.   Skin: She is not diaphoretic. No erythema. There is pallor.   Psychiatric: She has a normal mood and affect. Her behavior is normal.   Nursing note and vitals reviewed.      This exam was performed remotely in the unit aided by real-time audio/visual communication tools and with assistance from nursing staff.        Results Review:  I have reviewed the labs, radiology results, and diagnostic studies.    Laboratory Data:   Results from last 7 days   Lab Units 04/08/20  0802 04/06/20  0813 04/05/20  1045   WBC 10*3/mm3 11.88* 19.21* 18.77*   HEMOGLOBIN g/dL 10.6* 12.1 11.0*   HEMATOCRIT % 31.5* 35.6 32.3*   PLATELETS 10*3/mm3 413 339 340        Results from last 7 days   Lab Units 04/09/20  0805 04/08/20  0802 04/06/20  0813 04/05/20  1045   SODIUM mmol/L 140 136 135* 134*   POTASSIUM mmol/L 3.9 4.0 4.6 4.3   CHLORIDE mmol/L 105 102 99 100   CO2 mmol/L 22.0 20.0* 21.0* 20.0*   BUN mg/dL 68* 76* 51* 42*   CREATININE mg/dL 1.91* 2.33* 2.09* 1.74*   CALCIUM mg/dL 9.2 9.1 9.4 9.1   BILIRUBIN mg/dL  --  0.5 0.9 0.8   ALK PHOS U/L  --  139* 151* 130*   ALT (SGPT) U/L  --  261* 414* 445*   AST (SGOT) U/L  --  164* 259* 444*   GLUCOSE mg/dL 215* 233* 231* 241*       Culture Data:   Blood Culture   Date Value Ref Range Status   04/04/2020 No growth at 2 days  Preliminary   04/04/2020 No growth at 2 days  Preliminary       Radiology Data:   Imaging Results (Last 24 Hours)     ** No results found for the last 24 hours. **          I have reviewed the patient's current medications.     Assessment/Plan     Active Hospital Problems    Diagnosis   • **COVID-19 virus detected   • Obesity (BMI 30-39.9)   • Elevated LFTs   • Elevated LDH   • Elevated ferritin   • CKD (chronic kidney disease) stage 4, GFR 15-29 ml/min (CMS/HCC)   • Febrile illness   • Type 2 diabetes mellitus (CMS/HCC)       Plan:  1.  Consulted ID, appreciate their  assistance  2.  Continue hydroxychloroquine 400 mg BID on day 1, followed by 200 mg BID for 4 days  3.  QTc reviewed, 465 this AM.  Continue current therapy  4.  Sliding scale insulin with qac/qhs accuchecks  5.  Supportive care with fluid restrictive strategy   7.  AM lab holiday   8.  Lovenox 30 mg q24h  9.  Continue zinc, 100 mg daily, this is based on anecdotal evidence, but little harm associated with treatment.  10.  Furosemide 40 mg IV x 1 yesterday, will hold on further diuresis today as she had a good response yesterday  11.  Discussed case at length with palliative care, they are aiding her with a [place] MOST form as the patient has very strong wishes on certain items such as dialysis, feeding tubes, etc.    12.  Patient very motivated today, PT, will arrange telehealth visits  13.  Added magic cups and boost   14.  Discontinued the fluoxetine due to concerns of QTc prolongation in the setting of hydroxychloroquine.  Will add back the fluoxetine after course of hydroxychloroquine is complete.    15.  Will continue the provigil.  Dr. Hall had a lengthy discussion with the patients POA/daughter and they decided the risk outweigh the benefits.  This may only be temporary as the patient gets to feeling better energy/mood may improve.      Will consider reswabbing the patient in the middle of the next week to evaluate viral clearance for placement.       Discharge Planning: I expect the patient to be discharged to ? in ? days.    Jesse Kilgore MD   04/09/20   11:34

## 2020-04-09 NOTE — PLAN OF CARE
Problem: Patient Care Overview  Goal: Plan of Care Review  Outcome: Ongoing (interventions implemented as appropriate)  Flowsheets (Taken 4/9/2020 1842)  Progress: improving  Note:   VSS, afebrile, adequate urine output per purewick, maintaining oxygen saturation on 6L NC, desats with minimal exertion, pt OOB to chair for approx 1 hour, to BSC x1, resting in bed rest of shift, pt denies pain or SOA, conversational when in room, phone within reach, call light within reach, pt has no further questions or complaints at this time, will continue to monitor.   Goal: Individualization and Mutuality  Outcome: Ongoing (interventions implemented as appropriate)  Goal: Discharge Needs Assessment  Outcome: Ongoing (interventions implemented as appropriate)  Goal: Interprofessional Rounds/Family Conf  Outcome: Ongoing (interventions implemented as appropriate)     Problem: Infection, Risk/Actual (Adult)  Goal: Identify Related Risk Factors and Signs and Symptoms  Outcome: Ongoing (interventions implemented as appropriate)  Goal: Infection Prevention/Resolution  Outcome: Ongoing (interventions implemented as appropriate)     Problem: Diabetes, Type 2 (Adult)  Goal: Signs and Symptoms of Listed Potential Problems Will be Absent, Minimized or Managed (Diabetes, Type 2)  Outcome: Ongoing (interventions implemented as appropriate)     Problem: Breathing Pattern Ineffective (Adult)  Goal: Identify Related Risk Factors and Signs and Symptoms  Outcome: Ongoing (interventions implemented as appropriate)  Goal: Effective Oxygenation/Ventilation  Outcome: Ongoing (interventions implemented as appropriate)  Goal: Anxiety/Fear Reduction  Outcome: Ongoing (interventions implemented as appropriate)     Problem: Fall Risk (Adult)  Goal: Identify Related Risk Factors and Signs and Symptoms  Outcome: Ongoing (interventions implemented as appropriate)  Goal: Absence of Fall  Outcome: Ongoing (interventions implemented as appropriate)      Problem: Skin Injury Risk (Adult)  Goal: Identify Related Risk Factors and Signs and Symptoms  Outcome: Ongoing (interventions implemented as appropriate)  Goal: Skin Health and Integrity  Outcome: Ongoing (interventions implemented as appropriate)     Problem: Social Isolation (Adult)  Goal: Identify Related Risk Factors and Signs and Symptoms  Outcome: Ongoing (interventions implemented as appropriate)  Goal: Socialization Enhancement  Outcome: Ongoing (interventions implemented as appropriate)

## 2020-04-10 PROBLEM — F32.9 REACTIVE DEPRESSION: Status: ACTIVE | Noted: 2020-04-10

## 2020-04-10 PROBLEM — F01.50 VASCULAR DEMENTIA WITHOUT BEHAVIORAL DISTURBANCE (HCC): Chronic | Status: ACTIVE | Noted: 2020-04-10

## 2020-04-10 PROBLEM — I10 ESSENTIAL HYPERTENSION: Chronic | Status: ACTIVE | Noted: 2020-04-10

## 2020-04-10 LAB
GLUCOSE BLDC GLUCOMTR-MCNC: 222 MG/DL (ref 70–130)
GLUCOSE BLDC GLUCOMTR-MCNC: 234 MG/DL (ref 70–130)
GLUCOSE BLDC GLUCOMTR-MCNC: 247 MG/DL (ref 70–130)
GLUCOSE BLDC GLUCOMTR-MCNC: 273 MG/DL (ref 70–130)

## 2020-04-10 PROCEDURE — 63710000001 INSULIN LISPRO (HUMAN) PER 5 UNITS: Performed by: FAMILY MEDICINE

## 2020-04-10 PROCEDURE — 82962 GLUCOSE BLOOD TEST: CPT

## 2020-04-10 PROCEDURE — 25010000002 ENOXAPARIN PER 10 MG: Performed by: FAMILY MEDICINE

## 2020-04-10 PROCEDURE — 97163 PT EVAL HIGH COMPLEX 45 MIN: CPT

## 2020-04-10 PROCEDURE — 97162 PT EVAL MOD COMPLEX 30 MIN: CPT

## 2020-04-10 PROCEDURE — 97167 OT EVAL HIGH COMPLEX 60 MIN: CPT | Performed by: OCCUPATIONAL THERAPIST

## 2020-04-10 PROCEDURE — 25010000002 FUROSEMIDE PER 20 MG

## 2020-04-10 PROCEDURE — 97166 OT EVAL MOD COMPLEX 45 MIN: CPT | Performed by: OCCUPATIONAL THERAPIST

## 2020-04-10 RX ORDER — FUROSEMIDE 10 MG/ML
40 INJECTION INTRAMUSCULAR; INTRAVENOUS ONCE
Status: COMPLETED | OUTPATIENT
Start: 2020-04-10 | End: 2020-04-10

## 2020-04-10 RX ORDER — FLUOXETINE 10 MG/1
10 CAPSULE ORAL DAILY
Status: DISCONTINUED | OUTPATIENT
Start: 2020-04-11 | End: 2020-04-21 | Stop reason: HOSPADM

## 2020-04-10 RX ORDER — FUROSEMIDE 10 MG/ML
INJECTION INTRAMUSCULAR; INTRAVENOUS
Status: COMPLETED
Start: 2020-04-10 | End: 2020-04-10

## 2020-04-10 RX ADMIN — HYDROXYCHLOROQUINE SULFATE 200 MG: 200 TABLET, FILM COATED ORAL at 09:52

## 2020-04-10 RX ADMIN — MODAFINIL 100 MG: 100 TABLET ORAL at 09:52

## 2020-04-10 RX ADMIN — INSULIN LISPRO 3 UNITS: 100 INJECTION, SOLUTION INTRAVENOUS; SUBCUTANEOUS at 12:19

## 2020-04-10 RX ADMIN — Medication 100 MG: at 09:52

## 2020-04-10 RX ADMIN — FUROSEMIDE 40 MG: 10 INJECTION, SOLUTION INTRAMUSCULAR; INTRAVENOUS at 10:21

## 2020-04-10 RX ADMIN — INSULIN LISPRO 2 UNITS: 100 INJECTION, SOLUTION INTRAVENOUS; SUBCUTANEOUS at 17:28

## 2020-04-10 RX ADMIN — FUROSEMIDE 40 MG: 10 INJECTION INTRAMUSCULAR; INTRAVENOUS at 10:21

## 2020-04-10 RX ADMIN — ENOXAPARIN SODIUM 30 MG: 30 INJECTION SUBCUTANEOUS at 17:26

## 2020-04-10 RX ADMIN — INSULIN LISPRO 3 UNITS: 100 INJECTION, SOLUTION INTRAVENOUS; SUBCUTANEOUS at 10:16

## 2020-04-10 RX ADMIN — INSULIN LISPRO 4 UNITS: 100 INJECTION, SOLUTION INTRAVENOUS; SUBCUTANEOUS at 20:10

## 2020-04-10 RX ADMIN — HYDROXYCHLOROQUINE SULFATE 200 MG: 200 TABLET, FILM COATED ORAL at 20:08

## 2020-04-10 RX ADMIN — SODIUM CHLORIDE, PRESERVATIVE FREE 10 ML: 5 INJECTION INTRAVENOUS at 20:08

## 2020-04-10 RX ADMIN — SODIUM CHLORIDE, PRESERVATIVE FREE 10 ML: 5 INJECTION INTRAVENOUS at 10:17

## 2020-04-10 NOTE — THERAPY EVALUATION
Patient Name: Gema Chapman  : 1940    MRN: [id number]                              Today's Date: 4/10/2020       Admit Date: 2020    Visit Dx:     ICD-10-CM ICD-9-CM   1. Febrile illness R50.9 780.60   2. Pneumonia due to infectious organism, unspecified laterality, unspecified part of lung J18.9 136.9     484.8   3. Chronic kidney disease, unspecified CKD stage N18.9 585.9   4. Transaminitis R74.0 790.4   5. COVID-19 virus infection U07.1    6. Impaired mobility and ADLs Z74.09 799.89   7. Impaired mobility Z74.09 799.89     Patient Active Problem List   Diagnosis   • Febrile illness   • COVID-19 virus detected   • Type 2 diabetes mellitus (CMS/HCC)   • CKD (chronic kidney disease) stage 4, GFR 15-29 ml/min (CMS/HCC)   • Obesity (BMI 30-39.9)   • Elevated LFTs   • Elevated LDH   • Elevated ferritin   • Essential hypertension   • Vascular dementia without behavioral disturbance (CMS/HCC)   • Reactive depression     Past Medical History:   Diagnosis Date   • Diabetes (CMS/HCC)    • Hyperlipemia    • Hypertension    • Renal disease      Past Surgical History:   Procedure Laterality Date   • CHOLECYSTECTOMY     • HIP ARTHROPLASTY Left      General Information     Row Name 04/10/20 0920          PT Evaluation Time/Intention    Document Type  evaluation Pt admitted from home with previous diagnosis of COVID 19, but worsening symptoms at home. Dx: COVID 19 +, febrile illness, A/C renal failure.  -AGNIESZKA     Mode of Treatment  physical therapy  -AGNIESZKA     Row Name 04/10/20 0920          General Information    Patient Profile Reviewed?  yes  -AGNIESZKA     Prior Level of Function  independent:;all household mobility;community mobility;w/c or scooter;ADL's Has a w/c but can also ambulate w a RW  -AGNIESZKA     Existing Precautions/Restrictions  fall;oxygen therapy device and L/min COVID +  -AGNIESZKA     Barriers to Rehab  medically complex  -AGNIESZKA     Row Name 04/10/20 0920          Relationship/Environment    Lives With  child(lior), adult  Daughter  -AGNIESZKA     Row Name 04/10/20 0920          Resource/Environmental Concerns    Current Living Arrangements  home/apartment/condo  -AGNIESZKA     Row Name 04/10/20 0920          Home Main Entrance    Number of Stairs, Main Entrance  none  -AGNIESZKA     Row Name 04/10/20 0920          Cognitive Assessment/Intervention- PT/OT    Orientation Status (Cognition)  oriented x 4  -AGNIESZKA     Personal Safety Interventions  fall prevention program maintained;gait belt;muscle strengthening facilitated;nonskid shoes/slippers when out of bed  -AGNIESZKA     Row Name 04/10/20 0920          Safety Issues, Functional Mobility    Impairments Affecting Function (Mobility)  endurance/activity tolerance;shortness of breath;strength  -AGNIESZKA       User Key  (r) = Recorded By, (t) = Taken By, (c) = Cosigned By    Initials Name Provider Type    Jeff Arvizu PT DPT Physical Therapist        Mobility     Row Name 04/10/20 0920          Bed Mobility Assessment/Treatment    Bed Mobility Assessment/Treatment  supine-sit;sit-supine  -AGNIESZKA     Supine-Sit [place] (Bed Mobility)  supervision;verbal cues;nonverbal cues (demo/gesture)  -AGNIESZKA     Sit-Supine [place] (Bed Mobility)  supervision;verbal cues;nonverbal cues (demo/gesture)  -AGNIESZKA     Assistive Device (Bed Mobility)  head of bed elevated;bed rails  -AGNIESZKA     Comment (Bed Mobility)  cues to safely get LE to EOB and to use UE to come into sitting EOB, RN in room.   -AGNIESZKA     Row Name 04/10/20 0920          Transfer Assessment/Treatment    Comment (Transfers)  (S) Unable due to fatigue and desat, requested to return to bed.   -AGNIESZKA       User Key  (r) = Recorded By, (t) = Taken By, (c) = Cosigned By    Initials Name Provider Type    Jeff Arvizu PT DPT Physical Therapist        Obj/Interventions     Row Name 04/10/20 0920          General ROM    GENERAL ROM COMMENTS  B LE AROM WFL  -AGNIESZKA     Row Name 04/10/20 0920          MMT (Manual Muscle Testing)    General MMT Comments  B LE functionally 3+/5  -AGNIESZKA      Row Name 04/10/20 0920          Static Sitting Balance    Level of [place] (Unsupported Sitting, Static Balance)  supervision  -AGNIESZKA     Sitting Position (Unsupported Sitting, Static Balance)  sitting on edge of bed  -AGNIESZKA     Row Name 04/10/20 0920          Sensory Assessment/Intervention    Sensory General Assessment  no sensation deficits identified per pt report  -AGNIESZKA       User Key  (r) = Recorded By, (t) = Taken By, (c) = Cosigned By    Initials Name Provider Type    Jeff Arvizu, PT DPT Physical Therapist        Goals/Plan     Row Name 04/10/20 0920          Bed Mobility Goal 1 (PT)    Activity/Assistive Device (Bed Mobility Goal 1, PT)  sit to supine/supine to sit  -AGNIESZKA     [place] Level/Cues Needed (Bed Mobility Goal 1, PT)  independent  -AGNIESZKA     Time Frame (Bed Mobility Goal 1, PT)  long term goal (LTG);10 days  -AGNIESZKA     Progress/Outcomes (Bed Mobility Goal 1, PT)  goal ongoing  -AGNIESZKA     Row Name 04/10/20 0920          Transfer Goal 1 (PT)    Activity/Assistive Device (Transfer Goal 1, PT)  sit-to-stand/stand-to-sit;bed-to-chair/chair-to-bed;walker, rolling  -AGNIESZKA     [place] Level/Cues Needed (Transfer Goal 1, PT)  supervision required  -AGNIESZKA     Time Frame (Transfer Goal 1, PT)  long term goal (LTG);10 days  -AGNIESZKA     Progress/Outcome (Transfer Goal 1, PT)  goal ongoing  -AGNIESZKA     Row Name 04/10/20 0920          ROM Goal 1 (PT)    ROM Goal 1 (PT)  Independent B LE HEP x 20 reps  -AGNIESZKA     Time Frame (ROM Goal 1, PT)  long term goal (LTG)  -AGNIESZKA     Progress/Outcome (ROM Goal 1, PT)  goal ongoing  -AGNIESZKA       User Key  (r) = Recorded By, (t) = Taken By, (c) = Cosigned By    Initials Name Provider Type    Jeff Arvizu, PT DPT Physical Therapist        Clinical Impression     Row Name 04/10/20 0920          Pain Assessment    Additional Documentation  Pain Scale: Numbers Pre/Post-Treatment (Group)  -AGNIESZKA     Row Name 04/10/20 0920          Pain Scale: Numbers Pre/Post-Treatment    Pain Scale:  Numbers, Pretreatment  0/10 - no pain  -AGNIESZKA     Pain Scale: Numbers, Post-Treatment  0/10 - no pain  -AGNIESZKA     Row Name 04/10/20 0920          Plan of Care Review    Plan of Care Reviewed With  patient  -AGNIESZKA     Row Name 04/10/20 0920          Physical Therapy Clinical Impression    Patient/Family Goals Statement (PT Clinical Impression)  increase mobility  -AGNIESZKA     Criteria for Skilled Interventions Met (PT Clinical Impression)  yes;treatment indicated  -AGNIESZKA     Rehab Potential (PT Clinical Summary)  good, to achieve stated therapy goals  -AGNIESZKA     Predicted Duration of Therapy (PT)  until d/c  -AGNIESZKA     Row Name 04/10/20 0920          Vital Signs    Pre SpO2 (%)  94  -AGNIESZKA     O2 Delivery Pre Treatment  hi-flow  -AGNIESZKA     Post SpO2 (%)  85  -AGNIESZKA     O2 Delivery Post Treatment  hi-flow  -AGNIESZKA     Pre Patient Position  Supine  -AGNIESZKA     Intra Patient Position  Sitting  -AGNIESZKA     Post Patient Position  Supine  -AGNIESZKA     Row Name 04/10/20 0920          Positioning and Restraints    Pre-Treatment Position  in bed  -AGNIESZKA     Post Treatment Position  bed  -AGNIESZKA     In Bed  fowlers;call light within reach;encouraged to call for assist;with nsg  -AGNIESZKA       User Key  (r) = Recorded By, (t) = Taken By, (c) = Cosigned By    Initials Name Provider Type    Jeff Arvizu, PT DPT Physical Therapist        Outcome Measures     Row Name 04/10/20 0920          How much help from another person do you currently need...    Turning from your back to your side while in flat bed without using bedrails?  3  -AGNIESZKA     Moving from lying on back to sitting on the side of a flat bed without bedrails?  3  -AGNIESZKA     Moving to and from a bed to a chair (including a wheelchair)?  3  -AGNIESZKA     Standing up from a chair using your arms (e.g., wheelchair, bedside chair)?  3  -AGNIESZKA     Climbing 3-5 steps with a railing?  2  -AGNIESZKA     To walk in hospital room?  2  -AGNIESZKA     AM-PAC 6 Clicks Score (PT)  16  -AGNIESZKA     Row Name 04/10/20 0920          Functional Assessment    Outcome Measure  Options  AM-PAC 6 Clicks Basic Mobility (PT)  -AGNIESZKA       User Key  (r) = Recorded By, (t) = Taken By, (c) = Cosigned By    Initials Name Provider Type    Jeff Arvizu PT DPT Physical Therapist          PT Recommendation and Plan  Planned Therapy Interventions (PT Eval): bed mobility training, transfer training, gait training, balance training, home exercise program, patient/family education, postural re-education, strengthening  Outcome Summary/Treatment Plan (PT)  Anticipated Discharge Disposition (PT): inpatient rehabilitation facility, skilled nursing facility  Plan of Care Reviewed With: patient  Outcome Summary: PT Telehealth eval completed with assistance from RNRaine. The patient presents alert and oriented. No complaints while in bed. She demos 3+/5 strength in B LE. She was educated on how to safely t/f from fowlers to sitting EOB. While EOB she demos SOB and her cough increased. She desat to 85% and returned back to bed. Rn reports she was in less distress and the patient appeared comfortable. She was educated on exercises in B LE x 3 sets x 10 reps to be completed independently. PT will continue to progress strength and functional mobility as able/appropriate. I anticipate she will need short term rehab for continued therapy.     Time Calculation:   PT Charges     Row Name 04/10/20 1348             Time Calculation    Start Time  0920  -AGNIESZKA      Stop Time  1020  -AGNIESZKA      Time Calculation (min)  60 min  -AGNIESZKA      PT Received On  04/10/20  -AGNIESZKA      PT Goal Re-Cert Due Date  04/20/20  -AGNIESZKA        User Key  (r) = Recorded By, (t) = Taken By, (c) = Cosigned By    Initials Name Provider Type    Jeff Arvizu, PT DPT Physical Therapist        Therapy Charges for Today     Code Description Service Date Service Provider Modifiers Qty    [id number]  PT EVAL HIGH COMPLEXITY 4 4/10/2020 Jeff Rey, PT DPT GP 1        This was an audio and video enabled telemedicine encounter.      PT  G-Codes  Outcome Measure Options: AM-PAC 6 Clicks Daily Activity (OT)  AM-PAC 6 Clicks Score (PT): 16  AM-PAC 6 Clicks Score (OT): 15    Jeff Rey, PT DPT  4/10/2020

## 2020-04-10 NOTE — THERAPY EVALUATION
Acute Care - Occupational Therapy Initial Evaluation  [facility]     Patient Name: Gema Chapman  : 1940  MRN: [id number]  Today's Date: 4/10/2020  Onset of Illness/Injury or Date of Surgery: 20  Date of Referral to OT: 04/10/20  Referring Physician: Dr. Kilgore    Admit Date: 2020       ICD-10-CM ICD-9-CM   1. Febrile illness R50.9 780.60   2. Pneumonia due to infectious organism, unspecified laterality, unspecified part of lung J18.9 136.9     484.8   3. Chronic kidney disease, unspecified CKD stage N18.9 585.9   4. Transaminitis R74.0 790.4   5. COVID-19 virus infection U07.1    6. Impaired mobility and ADLs Z74.09 799.89   7. Impaired mobility Z74.09 799.89     Patient Active Problem List   Diagnosis   • Febrile illness   • COVID-19 virus detected   • Type 2 diabetes mellitus (CMS/[facility])   • CKD (chronic kidney disease) stage 4, GFR 15-29 ml/min (CMS/HCC)   • Obesity (BMI 30-39.9)   • Elevated LFTs   • Elevated LDH   • Elevated ferritin   • Essential hypertension   • Vascular dementia without behavioral disturbance (CMS/HCC)   • Reactive depression     Past Medical History:   Diagnosis Date   • Diabetes (CMS/HCC)    • Hyperlipemia    • Hypertension    • Renal disease      Past Surgical History:   Procedure Laterality Date   • CHOLECYSTECTOMY     • HIP ARTHROPLASTY Left           OT ASSESSMENT FLOWSHEET (last 12 hours)      Occupational Therapy Evaluation     Row Name 04/10/20 0930                   OT Evaluation Time/Intention    Subjective Information  no complaints  -MM        Document Type  evaluation;other (see comments) see MAR  -MM        Mode of Treatment  occupational therapy;other (see comments) via telehealth  -MM           General Information    Patient Profile Reviewed?  yes  -MM        Onset of Illness/Injury or Date of Surgery  20  -MM        Referring Physician  Dr. Kilgore  -MM        Patient Observations  alert;cooperative;agree to therapy  -MM        Patient/Family  Observations  RN present with Ipad for telehealth  -MM        General Observations of Patient  fowlers, no apparent distress, 8L O2/NC high flow  -MM        Prior Level of Function  independent:;all household mobility;community mobility;transfer;w/c or scooter;ADL's;feeding;grooming;dressing;bathing  -MM        Equipment Currently Used at Home  wheelchair;walker, rolling;shower chair  -MM        Pertinent History of Current Functional Problem  Pt admitted from home with previous diagnosis of COVID 19, but worsening symptoms at home. Dx: COVID 19, febrile illness, A/C renal failure.  -MM        Existing Precautions/Restrictions  fall;oxygen therapy device and L/min  -MM        Equipment Issued to Patient  -- RW and gait belt left on unit for use, RN notified  -MM        Risks Reviewed  patient:;other:;LOB;nausea/vomiting;dizziness;increased discomfort;change in vital signs RN  -MM        Benefits Reviewed  patient:;other:;improve function;increase independence;increase strength;increase balance;decrease pain;decrease risk of DVT;improve skin integrity;increase knowledge RN  -MM        Barriers to Rehab  medically complex  -MM           Relationship/Environment    Lives With  child(lior), adult  -MM        Name(s) of Who Lives With Patient  daughter, who is a retired APRN  -MM        Family Caregiver if Needed  child(lior), adult  -MM        Concerns About Impact on Relationships  Pt reports she fluccuates between using RW and w/c at baseline  -MM           Resource/Environmental Concerns    Current Living Arrangements  home/apartment/condo  -MM           Cognitive Assessment/Interventions    Additional Documentation  Cognitive Assessment/Intervention (Group)  -MM           Cognitive Assessment/Intervention- PT/OT    Affect/Mental Status (Cognitive)  WNL  -MM        Orientation Status (Cognition)  oriented x 4  -MM        Follows Commands (Cognition)  follows one step commands;over 90% accuracy  -MM        Cognitive  Function (Cognitive)  WNL  -MM        Personal Safety Interventions  fall prevention program maintained;muscle strengthening facilitated;nonskid shoes/slippers when out of bed;supervised activity  -MM           Safety Issues, Functional Mobility    Impairments Affecting Function (Mobility)  endurance/activity tolerance;shortness of breath  -MM           Bed Mobility Assessment/Treatment    Bed Mobility Assessment/Treatment  supine-sit;sit-supine  -MM        Supine-Sit [place] (Bed Mobility)  supervision;verbal cues;nonverbal cues (demo/gesture)  -MM        Sit-Supine [place] (Bed Mobility)  supervision;verbal cues;nonverbal cues (demo/gesture)  -MM        Assistive Device (Bed Mobility)  head of bed elevated;bed rails  -MM           Transfer Assessment/Treatment    Comment (Transfers)  Deferred d/t fatigue  -MM           ADL Assessment/Intervention    BADL Assessment/Intervention  lower body dressing  -MM           Lower Body Dressing Assessment/Training    Comment (Lower Body Dressing)  Pt unable to complete d/t endurance and coughing, expected level of assist min to mod A  -MM           BADL Safety/Performance    Impairments, BADL Safety/Performance  endurance/activity tolerance;shortness of breath  -MM           General ROM    GENERAL ROM COMMENTS  BUE AROM WFL  -MM           MMT (Manual Muscle Testing)    General MMT Comments  BUE functionally appear 4-/5  -MM           Motor Assessment/Interventions    Additional Documentation  Fine Motor Testing & Training (Group);Gross Motor Coordination (Group);Balance (Group);Balance Interventions (Group)  -MM           Gross Motor Coordination    Gross Motor Skill, Impairments Detail  WNL BUE finger to nose  -MM           Balance    Balance  static sitting balance  -MM           Static Sitting Balance    Level of [place] (Unsupported Sitting, Static Balance)  supervision  -MM        Sitting Position (Unsupported Sitting, Static Balance)  sitting on edge of  bed  -MM        Time Able to Maintain Position (Unsupported Sitting, Static Balance)  30 to 45 seconds  -MM        Comment (Unsupported Sitting, Static Balance)  unable to continue d/t coughing and fatigue  -MM           Fine Motor Testing & Training    Comment, Fine Motor Coordination  WNL BUE functionally  -MM           Sensory Assessment/Intervention    Sensory General Assessment  no sensation deficits identified  -MM           Positioning and Restraints    Pre-Treatment Position  in bed  -MM        Post Treatment Position  bed  -MM        In Bed  notified nsg;fowlers;call light within reach;encouraged to call for assist;with nsg;side rails up x2  -MM           Pain Assessment    Additional Documentation  Pain Scale: Numbers Pre/Post-Treatment (Group)  -MM           Pain Scale: Numbers Pre/Post-Treatment    Pain Scale: Numbers, Pretreatment  0/10 - no pain  -MM        Pain Scale: Numbers, Post-Treatment  0/10 - no pain  -MM           Wound 04/05/20 0800 Bilateral coccyx MASD (Moisture associated skin damage)    Wound - Properties Group Date first assessed: 04/05/20  - Time first assessed: 0800  - Present on Hospital Admission: Y  -MC Side: Bilateral  -MC Location: coccyx  -MC Primary Wound Type: MASD  -MC       Plan of Care Review    Plan of Care Reviewed With  patient  -MM        Progress  no change  -MM        Outcome Summary  OT evaluation completed via telehealth with assistance from CECY Ni. Pt denies any pain or other complaints. Pt is in good spirits and agreeable to therapy. Pt on 8L high flow and has mild desat with increased time to recover. Pt has increased coughing once sitting EOB. Pt supervision with HOB elevated and bed rails to complete bed mobility and sit EOB. Pt unable to complete further mobility and ADLs d/t decreased endurance and increased coughing. Once returned to [facility], pt's O2 sats take increased time to recover, but pt appears in less distress and RN reports the same. Expected  level of assist for LB dressing is min to mod A d/t endurance. Pt demo's good coordination and ROM during evaluation. Pt encouraged to be up in chair or EOB for meals. Pt educated on and encouraged to completed BUE AROM HEP as medical status allows. Skilled OT recommended to address adls, functional mobility and endurance. Recommended d/c short term rehab for continued therapy.  -MM           Clinical Impression (OT)    Date of Referral to OT  04/10/20  -MM        OT Diagnosis  impaired mobility and adls  -MM        Prognosis (OT Eval)  good  -MM        Functional Level at Time of Evaluation (OT Eval)  adequate  -MM        Patient/Family Goals Statement (OT Eval)  rehab then home  -MM        Criteria for Skilled Therapeutic Interventions Met (OT Eval)  yes;treatment indicated  -MM        Rehab Potential (OT Eval)  good, to achieve stated therapy goals  -MM        Therapy Frequency (OT Eval)  other (see comments) 2-3 days per week  -MM        Predicted Duration of Therapy Intervention (Therapy Eval)  until d/c  -MM        Care Plan Review (OT)  care plan/treatment goals reviewed;evaluation/treatment results reviewed;risks/benefits reviewed;current/potential barriers reviewed;patient/other agree to care plan  -MM        Care Plan Review, Other Participant (OT Eval)  other (see comments) CECY Ni  -MM        Anticipated Discharge Disposition (OT)  inpatient rehabilitation facility;transitional care  -MM           Vital Signs    Pre SpO2 (%)  94  -MM        O2 Delivery Pre Treatment  hi-flow 8L  -MM        Intra SpO2 (%)  89  -MM        O2 Delivery Intra Treatment  hi-flow 8L  -MM        Post SpO2 (%)  85  -MM        O2 Delivery Post Treatment  hi-flow 8L  -MM        Pre Patient Position  -- fowlers  -MM        Intra Patient Position  Sitting  -MM        Post Patient Position  -- fowlers  -MM           Planned OT Interventions    Planned Therapy Interventions (OT Eval)  activity tolerance training;adaptive equipment  training;BADL retraining;functional balance retraining;occupation/activity based interventions;passive ROM/stretching;patient/caregiver education/training;strengthening exercise;ROM/therapeutic exercise;transfer/mobility retraining;wheelchair assessment/training  -MM           OT Goals    Dressing Goal Selection (OT)  dressing, OT goal 1  -MM        Toileting Goal Selection (OT)  toileting, OT goal 1  -MM        Grooming Goal Selection (OT)  grooming, OT goal 1  -MM        Activity Tolerance Goal Selection (OT)  activity tolerance, OT goal 1  -MM        Additional Documentation  Grooming Goal Selection (OT) (Row);Activity Tolerance Goal Selection (OT) (Row)  -MM           Dressing Goal 1 (OT)    Activity/Assistive Device (Dressing Goal 1, OT)  dressing skills, all  -MM        [place]/Cues Needed (Dressing Goal 1, OT)  supervision required;set-up required;tactile cues required;verbal cues required  -MM        Time Frame (Dressing Goal 1, OT)  10 days  -MM        Progress/Outcome (Dressing Goal 1, OT)  goal ongoing  -MM           Toileting Goal 1 (OT)    Activity/Device (Toileting Goal 1, OT)  toileting skills, all;commode, bedside with drop arms;commode, bedside without drop arms  -MM        [place] Level/Cues Needed (Toileting Goal 1, OT)  supervision required;set-up required;tactile cues required;verbal cues required  -MM        Time Frame (Toileting Goal 1, OT)  10 days  -MM        Progress/Outcome (Toileting Goal 1, OT)  goal ongoing  -MM           Grooming Goal 1 (OT)    Activity/Device (Grooming Goal 1, OT)  grooming skills, all  -MM        [place] (Grooming Goal 1, OT)  supervision required;set-up required;tactile cues required;verbal cues required  -MM        Time Frame (Grooming Goal 1, OT)  10 days  -MM        Progress/Outcome (Grooming Goal 1, OT)  goal ongoing  -MM            Activity Tolerance Goal 1 (OT)    Activity Tolerance Goal 1 (OT)  Pt will participate in 5 min of ADL activity with  O2 sats at or above 88%.  -MM        Activity Level (Endurance Goal 1, OT)  5 min activity;O2 sat >/ equal to 88%  -MM        Time Frame (Activity Tolerance Goal 1, OT)  10 days  -MM        Progress/Outcome (Activity Tolerance Goal 1, OT)  goal ongoing  -MM           Living Environment    Home Accessibility  other (see comments) walk in shower, no chair  -MM          User Key  (r) = Recorded By, (t) = Taken By, (c) = Cosigned By    Initials Name Effective Dates    Lynette Knapp RN 08/02/16 -     MM Leno Mejía, OTR/L 04/03/18 -                OT Recommendation and Plan  Outcome Summary/Treatment Plan (OT)  Anticipated Discharge Disposition (OT): inpatient rehabilitation facility, transitional care  Planned Therapy Interventions (OT Eval): activity tolerance training, adaptive equipment training, BADL retraining, functional balance retraining, occupation/activity based interventions, passive ROM/stretching, patient/caregiver education/training, strengthening exercise, ROM/therapeutic exercise, transfer/mobility retraining, wheelchair assessment/training  Therapy Frequency (OT Eval): other (see comments)(2-3 days per week)  Plan of Care Review  Plan of Care Reviewed With: patient  Plan of Care Reviewed With: patient  Outcome Summary: OT evaluation completed via telehealth with assistance from CECY Ni. Pt denies any pain or other complaints. Pt is in good spirits and agreeable to therapy. Pt on 8L high flow and has mild desat with increased time to recover. Pt has increased coughing once sitting EOB. Pt supervision with HOB elevated and bed rails to complete bed mobility and sit EOB. Pt unable to complete further mobility and ADLs d/t decreased endurance and increased coughing. Once returned to [facility], pt's O2 sats take increased time to recover, but pt appears in less distress and RN reports the same. Expected level of assist for LB dressing is min to mod A d/t endurance. Pt demo's good coordination  and ROM during evaluation. Pt encouraged to be up in chair or EOB for meals. Pt educated on and encouraged to completed BUE AROM HEP as medical status allows. Skilled OT recommended to address adls, functional mobility and endurance. Recommended d/c short term rehab for continued therapy.    Outcome Measures     Row Name 04/10/20 1000             How much help from another is currently needed...    Putting on and taking off regular lower body clothing?  2  -MM      Bathing (including washing, rinsing, and drying)  2  -MM      Toileting (which includes using toilet bed pan or urinal)  2  -MM      Putting on and taking off regular upper body clothing  3  -MM      Taking care of personal grooming (such as brushing teeth)  3  -MM      Eating meals  3  -MM      AM-PAC 6 Clicks Score (OT)  15  -MM         Functional Assessment    Outcome Measure Options  AM-PAC 6 Clicks Daily Activity (OT)  -MM        User Key  (r) = Recorded By, (t) = Taken By, (c) = Cosigned By    Initials Name Provider Type    Leno Castro, OTR/L Occupational Therapist          Time Calculation:   Time Calculation- OT     Row Name 04/10/20 0930             Time Calculation- OT    OT Start Time  0930 +20 min chart review and set up  -MM      OT Stop Time  1010  -MM      OT Time Calculation (min)  40 min  -MM      OT Received On  04/10/20  -MM      OT Goal Re-Cert Due Date  04/20/20  -MM        User Key  (r) = Recorded By, (t) = Taken By, (c) = Cosigned By    Initials Name Provider Type    Leno Castro, OTR/L Occupational Therapist        Therapy Charges for Today     Code Description Service Date Service Provider Modifiers Qty    [id number]  OT EVAL HIGH COMPLEXITY 4 4/10/2020 Leno Mejía, OTR/L GO 1        This was an audio and video enabled telemedicine encounter.         YASIR Acharya/VI  4/10/2020

## 2020-04-10 NOTE — PROGRESS NOTES
"Infectious Diseases Progress Note    Patient:  Gema Chapman  YOB: 1940  MRN: [id number]   Admit date: 4/4/2020   Admitting Physician: Jesse Kilgore MD  Primary Care Physician: Duy Hall MD    Chief Complaint/Interval History: She indicates she feels better today.  She looks a little bit stronger.  She was smiling on video visit.  Nursing indicates she desaturated for quite a while after working with physical therapy.  It took her a while to recover.  They increased her oxygen until recovery.  She is now almost back to 6 L which is what she was on yesterday.  Although patient did not cough at all during today's interview via video, she does indicate she continues to have some problems with cough.  Just watching her it seems her cough is diminished in comparison to her first few days of admission.  Per discussion with nursing no edema or only trace edema posteriorly at the ankle.  Patient's oral intake still remains fairly small.  No nausea or vomiting.  No abdominal pain.    Intake/Output Summary (Last 24 hours) at 4/10/2020 1725  Last data filed at 4/10/2020 1028  Gross per 24 hour   Intake 200 ml   Output 600 ml   Net -400 ml     Allergies: No Known Allergies  Current Scheduled Medications:     enoxaparin 30 mg Subcutaneous Q24H   [START ON 4/11/2020] FLUoxetine 10 mg Oral Daily   hydroxychloroquine 200 mg Oral Q12H   insulin lispro 2-7 Units Subcutaneous 4x Daily With Meals & Nightly   modafinil 100 mg Oral Daily   sodium chloride 10 mL Intravenous Q12H   zinc gluconate 100 mg Oral Daily     Current PRN Medications:  •  acetaminophen **OR** acetaminophen **OR** acetaminophen  •  dextrose  •  dextrose  •  glucagon (human recombinant)  •  ondansetron  •  sodium chloride    Review of Systems no nausea or diarrhea.  See HPI.    Vital Signs:  /59 (BP Location: Left arm, Patient Position: Lying)   Pulse 63   Temp 97.1 °F (36.2 °C) (Axillary)   Resp 24   Ht 167.6 cm (66\")   " Wt 95 kg (209 lb 8 oz)   SpO2 96%   BMI 33.81 kg/m²     Physical Exam  Vital signs - reviewed.  Line/IV (per discussion with nurse) site - No erythema, warmth, induration, or tenderness.  Skin without rash  General she will smile.  She will interact.  She looks tired.  She does not look as tired as yesterday.  She appears to be breathing comfortably at rest.  Current oxygen saturation on 7 L was 94%.  To help preserve PPE in the setting of the COVID-19 pandemic, discussed physical exam with nurse and performed what physical exam I could via video visit.  Face-to-face physical exam at bedside not performed.    Lab Results:  CBC: Results from last 7 days   Lab Units 04/08/20  0802 04/06/20  0813 04/05/20  1045 04/04/20  1105   WBC 10*3/mm3 11.88* 19.21* 18.77* 15.82*   HEMOGLOBIN g/dL 10.6* 12.1 11.0* 11.5*   HEMATOCRIT % 31.5* 35.6 32.3* 34.1   PLATELETS 10*3/mm3 413 339 340 297     BMP:  Results from last 7 days   Lab Units 04/09/20  0805 04/08/20  0802 04/06/20  0813 04/05/20  1045 04/04/20  1105   SODIUM mmol/L 140 136 135* 134* 132*   POTASSIUM mmol/L 3.9 4.0 4.6 4.3 4.3   CHLORIDE mmol/L 105 102 99 100 96*   CO2 mmol/L 22.0 20.0* 21.0* 20.0* 21.0*   BUN mg/dL 68* 76* 51* 42* 51*   CREATININE mg/dL 1.91* 2.33* 2.09* 1.74* 2.26*   GLUCOSE mg/dL 215* 233* 231* 241* 226*   CALCIUM mg/dL 9.2 9.1 9.4 9.1 9.2   ALT (SGPT) U/L  --  261* 414* 445* 183*     On April 6, 2020 interleukin-6 level 149 (reference range 0-15 0.5)    Culture Results:   Blood Culture   Date Value Ref Range Status   04/04/2020 No growth at 5 days  Final   04/04/2020 No growth at 5 days  Final     Radiology: None    Additional Studies Reviewed: QTC was 462    Impression:   1.  COVID-19 pneumonia-subjectively she feels a little bit better.  Objectively she looks about the same.  2.  Chronic renal failure-stable  3.  Diabetes mellitus  4.  Hypertension    Recommendations:   Complete her 5-day course of hydroxychloroquine  Continue supportive  care  Encouraging her to get up to chair, try to keep moving some, and use incentive spirometry  Hopefully with ongoing supportive care she will turn the corner    Drew Castaneda MD

## 2020-04-10 NOTE — PLAN OF CARE
Problem: Patient Care Overview  Goal: Plan of Care Review  Outcome: Ongoing (interventions implemented as appropriate)   OT evaluation completed via telehealth with assistance from CECY Ni. Pt denies any pain or other complaints. Pt is in good spirits and agreeable to therapy. Pt on 8L high flow and has mild desat with increased time to recover. Pt has increased coughing once sitting EOB. Pt supervision with HOB elevated and bed rails to complete bed mobility and sit EOB. Pt unable to complete further mobility and ADLs d/t decreased endurance and increased coughing. Once returned to [facility], pt's O2 sats take increased time to recover, but pt appears in less distress and RN reports the same. Expected level of assist for LB dressing is min to mod A d/t endurance. Pt demo's good coordination and ROM during evaluation. Pt encouraged to be up in chair or EOB for meals. Pt educated on and encouraged to completed BUE AROM HEP as medical status allows. Skilled OT recommended to address adls, functional mobility and endurance. Recommended d/c short term rehab for continued therapy.

## 2020-04-10 NOTE — PLAN OF CARE
Problem: Patient Care Overview  Goal: Plan of Care Review  Flowsheets (Taken 4/10/2020 0920)  Outcome Summary: PT Telehealth eval completed with assistance from RN, Raine. The patient presents alert and oriented. No complaints while in bed. She demos 3+/5 strength in B LE. She was educated on how to safely t/f from fowlers to sitting EOB. While EOB she demos SOB and her cough increased. She desat to 85% and returned back to bed. Rn reports she was in less distress and the patient appeared comfortable. She was educated on exercises in B LE x 3 sets x 10 reps to be completed independently. PT will continue to progress strength and functional mobility as able/appropriate. I anticipate she will need short term rehab for continued therapy.

## 2020-04-10 NOTE — PROGRESS NOTES
[facility] Medicine Services  INPATIENT PROGRESS NOTE    Patient Name: Gema Chapman  Date of Admission: 4/4/2020  Today's Date: 04/10/20  Length of Stay: 6  Primary Care Physician: Duy Hall MD    Subjective   Chief Complaint: shortness of breath  HPI     This exam was performed remotely in the unit aided by real-time audio/visual communication tools and with assistance from nursing staff.      Patient is much brighter today and cheerful.  Patient has worked with PT and OT today via telehealth and overall did well.  She desaturated last night and they increased her to high-flow nasal cannula at 10 L and we are cutting back currently as she is doing better.  She is smiling and laughing this morning and is in better spirits.  She has eaten some better today.  Afebrile!    Discussed with patients daughter, Celena Shaver today.      Review of Systems   Constitutional: Positive for appetite change. Negative for activity change, chills, diaphoresis, fatigue and fever.   Respiratory: Negative for cough and shortness of breath.    Cardiovascular: Positive for leg swelling. Negative for chest pain and palpitations.   Gastrointestinal: Negative for abdominal distention, abdominal pain, constipation, diarrhea, nausea and vomiting.        All pertinent negatives and positives are as above. All other systems have been reviewed and are negative unless otherwise stated.     Objective    Temp:  [96.6 °F (35.9 °C)-98.7 °F (37.1 °C)] 97.4 °F (36.3 °C)  Heart Rate:  [57-97] 79  Resp:  [18-29] 18  BP: (102-129)/(53-63) 128/63  Physical Exam   Constitutional: She is oriented to person, place, and time. No distress.   HENT:   Head: Normocephalic and atraumatic.   Eyes: Conjunctivae are normal. No scleral icterus.   Neck: Neck supple.   Cardiovascular: Normal rate and regular rhythm.   Review of telemetry    Pulmonary/Chest: Effort normal. No respiratory distress.   Musculoskeletal: She  exhibits edema.   Neurological: She is alert and oriented to person, place, and time.   Skin: She is not diaphoretic. No erythema. There is pallor.   Psychiatric: She has a normal mood and affect. Her behavior is normal.   Nursing note and vitals reviewed.      This exam was performed remotely in the unit aided by real-time audio/visual communication tools and with assistance from nursing staff.        Results Review:  I have reviewed the labs, radiology results, and diagnostic studies.    Laboratory Data:   Results from last 7 days   Lab Units 04/08/20  0802 04/06/20  0813 04/05/20  1045   WBC 10*3/mm3 11.88* 19.21* 18.77*   HEMOGLOBIN g/dL 10.6* 12.1 11.0*   HEMATOCRIT % 31.5* 35.6 32.3*   PLATELETS 10*3/mm3 413 339 340        Results from last 7 days   Lab Units 04/09/20  0805 04/08/20  0802 04/06/20  0813 04/05/20  1045   SODIUM mmol/L 140 136 135* 134*   POTASSIUM mmol/L 3.9 4.0 4.6 4.3   CHLORIDE mmol/L 105 102 99 100   CO2 mmol/L 22.0 20.0* 21.0* 20.0*   BUN mg/dL 68* 76* 51* 42*   CREATININE mg/dL 1.91* 2.33* 2.09* 1.74*   CALCIUM mg/dL 9.2 9.1 9.4 9.1   BILIRUBIN mg/dL  --  0.5 0.9 0.8   ALK PHOS U/L  --  139* 151* 130*   ALT (SGPT) U/L  --  261* 414* 445*   AST (SGOT) U/L  --  164* 259* 444*   GLUCOSE mg/dL 215* 233* 231* 241*       Culture Data:   Blood Culture   Date Value Ref Range Status   04/04/2020 No growth at 2 days  Preliminary   04/04/2020 No growth at 2 days  Preliminary       Radiology Data:   Imaging Results (Last 24 Hours)     ** No results found for the last 24 hours. **          I have reviewed the patient's current medications.     Assessment/Plan     Active Hospital Problems    Diagnosis   • **COVID-19 virus detected   • Essential hypertension   • Vascular dementia without behavioral disturbance (CMS/HCC)   • Reactive depression   • Obesity (BMI 30-39.9)   • Elevated LFTs   • Elevated LDH   • Elevated ferritin   • CKD (chronic kidney disease) stage 4, GFR 15-29 ml/min (CMS/HCC)   •  Febrile illness   • Type 2 diabetes mellitus (CMS/HCC)       Plan:  1.  Consulted ID, appreciate their assistance  2.  Continue hydroxychloroquine 400 mg BID on day 1, followed by 200 mg BID for 4 days  3.  QTc reviewed, 463 this AM.  Continue current therapy  4.  Sliding scale insulin with qac/qhs accuchecks  5.  Supportive care with fluid restrictive strategy   7.  AM COVID panel   8.  Lovenox 30 mg q24h  9.  Continue zinc, 100 mg daily, this is based on anecdotal evidence, but little harm associated with treatment.  10.  Furosemide 40 mg IV x 1 again today   11.  Discussed case at length with palliative care, they aided her in a [place] MOST form   12.  PT/OT following via telehealth visits - Appreciate assistance   13.  Continue magic cups and boost   14.  Discontinued the fluoxetine due to concerns of QTc prolongation in the setting of hydroxychloroquine.  Restart fluoxetine tomorrow    15.  Will continue the provigil.  Dr. Hall had a lengthy discussion with the patients POA/daughter and they decided the risk outweigh the benefits.  This may only be temporary as the patient gets to feeling better energy/mood may improve.      Will consider reswabbing the patient in the middle of the next week (Tuesday/Wednesday) to evaluate viral clearance for placement.       Discharge Planning: I expect the patient to be discharged to ? in ? days.    Jesse Kilgore MD   04/10/20   12:18

## 2020-04-10 NOTE — PLAN OF CARE
Problem: Patient Care Overview  Goal: Plan of Care Review  Outcome: Ongoing (interventions implemented as appropriate)  Flowsheets (Taken 4/10/2020 0739)  Progress: no change  Plan of Care Reviewed With: patient  Outcome Summary: Denies pain. Pt desats to low 80s/high 70s with small amounts of exertion, takes a long time of sats to come back up. Pt placed on nonrebreather mask until sats were stable again. Respiratory notified and pt was placed on humidified highflow oxygen with sats staying 94-96%. Up to BSC with assist x1. Reluctant to do much activity, even turning in bed. Purewick in place. Accucheck ACHS with SS coverage. Remains afebrile. Pt is very weak. Other VSS, safety maintained, will cont to monitor.

## 2020-04-10 NOTE — PLAN OF CARE
Problem: Patient Care Overview  Goal: Plan of Care Review  Outcome: Ongoing (interventions implemented as appropriate)  Flowsheets (Taken 4/10/2020 1424)  Progress: no change  Plan of Care Reviewed With: other (see comments) (RN, Raine)  Outcome Summary: Initial RDN eval due to length of stay protocol.  Pt on Cardiac/CCHO diet with POintake 0%/7meal over the past 5 days.  RN does report Pt is trying to eat bites at meals and attempting to eat when she feels well; right now just has decreased appetite and fatigued due to virus.  Pt has supplements ordered, Boost Plus BID and Magic cup daily.  Will continue to follow.

## 2020-04-10 NOTE — PLAN OF CARE
Problem: Patient Care Overview  Goal: Plan of Care Review  4/10/2020 1846 by Raine Shipley, RN  Outcome: Ongoing (interventions implemented as appropriate)  Flowsheets (Taken 4/10/2020 1846)  Progress: improving  Note:   VSS, able to wean pt to 6L hi flow nasal cannula with humidification, pt to EOB with no assist x1, SOA with exertion, appetite and intake with meals improved. Aliya greatly approved, able to talk with family on her personal cell phone. Activity encouraged, not OOB to chair this shift despite multiple education discussions. Pt states she is worn out from PT Eval. Pt has no further questions or complaints at this time, will continue to monitor.   4/10/2020 1559 by Raine Shipley, RN  Outcome: Ongoing (interventions implemented as appropriate)  Note:   VSS, oxygen maintained on

## 2020-04-11 LAB
ALBUMIN SERPL-MCNC: 2.6 G/DL (ref 3.5–5.2)
ALBUMIN/GLOB SERPL: 0.7 G/DL
ALP SERPL-CCNC: 145 U/L (ref 39–117)
ALT SERPL W P-5'-P-CCNC: 220 U/L (ref 1–33)
ANION GAP SERPL CALCULATED.3IONS-SCNC: 11 MMOL/L (ref 5–15)
AST SERPL-CCNC: 83 U/L (ref 1–32)
BASOPHILS # BLD AUTO: 0.06 10*3/MM3 (ref 0–0.2)
BASOPHILS NFR BLD AUTO: 0.5 % (ref 0–1.5)
BILIRUB SERPL-MCNC: 0.6 MG/DL (ref 0.2–1.2)
BUN BLD-MCNC: 52 MG/DL (ref 8–23)
BUN/CREAT SERPL: 35.4 (ref 7–25)
CALCIUM SPEC-SCNC: 9.3 MG/DL (ref 8.6–10.5)
CHLORIDE SERPL-SCNC: 104 MMOL/L (ref 98–107)
CK SERPL-CCNC: 29 U/L (ref 20–180)
CO2 SERPL-SCNC: 26 MMOL/L (ref 22–29)
CREAT BLD-MCNC: 1.47 MG/DL (ref 0.57–1)
CRP SERPL-MCNC: 3.07 MG/DL (ref 0–0.5)
D DIMER PPP FEU-MCNC: 5.83 MG/L (FEU) (ref 0–0.5)
DEPRECATED RDW RBC AUTO: 46.5 FL (ref 37–54)
EOSINOPHIL # BLD AUTO: 0.2 10*3/MM3 (ref 0–0.4)
EOSINOPHIL NFR BLD AUTO: 1.5 % (ref 0.3–6.2)
ERYTHROCYTE [DISTWIDTH] IN BLOOD BY AUTOMATED COUNT: 13.7 % (ref 12.3–15.4)
FERRITIN SERPL-MCNC: 1358 NG/ML (ref 13–150)
GFR SERPL CREATININE-BSD FRML MDRD: 34 ML/MIN/1.73
GLOBULIN UR ELPH-MCNC: 4 GM/DL
GLUCOSE BLD-MCNC: 257 MG/DL (ref 65–99)
GLUCOSE BLDC GLUCOMTR-MCNC: 184 MG/DL (ref 70–130)
GLUCOSE BLDC GLUCOMTR-MCNC: 241 MG/DL (ref 70–130)
GLUCOSE BLDC GLUCOMTR-MCNC: 252 MG/DL (ref 70–130)
GLUCOSE BLDC GLUCOMTR-MCNC: 266 MG/DL (ref 70–130)
HCT VFR BLD AUTO: 34.1 % (ref 34–46.6)
HGB BLD-MCNC: 11.3 G/DL (ref 12–15.9)
IMM GRANULOCYTES # BLD AUTO: 0.3 10*3/MM3 (ref 0–0.05)
IMM GRANULOCYTES NFR BLD AUTO: 2.3 % (ref 0–0.5)
INR PPP: 1.24 (ref 0.91–1.09)
LDH SERPL-CCNC: 248 U/L (ref 135–214)
LYMPHOCYTES # BLD AUTO: 1.41 10*3/MM3 (ref 0.7–3.1)
LYMPHOCYTES NFR BLD AUTO: 10.9 % (ref 19.6–45.3)
MCH RBC QN AUTO: 31.1 PG (ref 26.6–33)
MCHC RBC AUTO-ENTMCNC: 33.1 G/DL (ref 31.5–35.7)
MCV RBC AUTO: 93.9 FL (ref 79–97)
MONOCYTES # BLD AUTO: 0.7 10*3/MM3 (ref 0.1–0.9)
MONOCYTES NFR BLD AUTO: 5.4 % (ref 5–12)
NEUTROPHILS # BLD AUTO: 10.24 10*3/MM3 (ref 1.7–7)
NEUTROPHILS NFR BLD AUTO: 79.4 % (ref 42.7–76)
NRBC BLD AUTO-RTO: 0 /100 WBC (ref 0–0.2)
PLATELET # BLD AUTO: 424 10*3/MM3 (ref 140–450)
PMV BLD AUTO: 10.1 FL (ref 6–12)
POTASSIUM BLD-SCNC: 4.1 MMOL/L (ref 3.5–5.2)
PROCALCITONIN SERPL-MCNC: 0.3 NG/ML (ref 0.1–0.25)
PROT SERPL-MCNC: 6.6 G/DL (ref 6–8.5)
PROTHROMBIN TIME: 15.5 SECONDS (ref 11.9–14.6)
RBC # BLD AUTO: 3.63 10*6/MM3 (ref 3.77–5.28)
SODIUM BLD-SCNC: 141 MMOL/L (ref 136–145)
WBC NRBC COR # BLD: 12.91 10*3/MM3 (ref 3.4–10.8)

## 2020-04-11 PROCEDURE — 83615 LACTATE (LD) (LDH) ENZYME: CPT | Performed by: INTERNAL MEDICINE

## 2020-04-11 PROCEDURE — 25010000002 FUROSEMIDE PER 20 MG: Performed by: INTERNAL MEDICINE

## 2020-04-11 PROCEDURE — 80053 COMPREHEN METABOLIC PANEL: CPT | Performed by: INTERNAL MEDICINE

## 2020-04-11 PROCEDURE — 86140 C-REACTIVE PROTEIN: CPT | Performed by: INTERNAL MEDICINE

## 2020-04-11 PROCEDURE — 82550 ASSAY OF CK (CPK): CPT | Performed by: INTERNAL MEDICINE

## 2020-04-11 PROCEDURE — 85379 FIBRIN DEGRADATION QUANT: CPT | Performed by: INTERNAL MEDICINE

## 2020-04-11 PROCEDURE — 85025 COMPLETE CBC W/AUTO DIFF WBC: CPT | Performed by: INTERNAL MEDICINE

## 2020-04-11 PROCEDURE — 82728 ASSAY OF FERRITIN: CPT | Performed by: INTERNAL MEDICINE

## 2020-04-11 PROCEDURE — 63710000001 INSULIN DETEMIR PER 5 UNITS: Performed by: INTERNAL MEDICINE

## 2020-04-11 PROCEDURE — 82962 GLUCOSE BLOOD TEST: CPT

## 2020-04-11 PROCEDURE — 25010000002 ENOXAPARIN PER 10 MG: Performed by: FAMILY MEDICINE

## 2020-04-11 PROCEDURE — 85610 PROTHROMBIN TIME: CPT | Performed by: INTERNAL MEDICINE

## 2020-04-11 PROCEDURE — 84145 PROCALCITONIN (PCT): CPT | Performed by: INTERNAL MEDICINE

## 2020-04-11 PROCEDURE — 63710000001 INSULIN LISPRO (HUMAN) PER 5 UNITS: Performed by: FAMILY MEDICINE

## 2020-04-11 RX ORDER — FUROSEMIDE 10 MG/ML
40 INJECTION INTRAMUSCULAR; INTRAVENOUS ONCE
Status: COMPLETED | OUTPATIENT
Start: 2020-04-11 | End: 2020-04-11

## 2020-04-11 RX ADMIN — SODIUM CHLORIDE, PRESERVATIVE FREE 10 ML: 5 INJECTION INTRAVENOUS at 08:54

## 2020-04-11 RX ADMIN — INSULIN LISPRO 4 UNITS: 100 INJECTION, SOLUTION INTRAVENOUS; SUBCUTANEOUS at 12:21

## 2020-04-11 RX ADMIN — FLUOXETINE 10 MG: 10 CAPSULE ORAL at 08:53

## 2020-04-11 RX ADMIN — HYDROXYCHLOROQUINE SULFATE 200 MG: 200 TABLET, FILM COATED ORAL at 08:53

## 2020-04-11 RX ADMIN — ENOXAPARIN SODIUM 30 MG: 30 INJECTION SUBCUTANEOUS at 17:20

## 2020-04-11 RX ADMIN — Medication 100 MG: at 08:53

## 2020-04-11 RX ADMIN — INSULIN DETEMIR 10 UNITS: 100 INJECTION, SOLUTION SUBCUTANEOUS at 20:59

## 2020-04-11 RX ADMIN — INSULIN LISPRO 3 UNITS: 100 INJECTION, SOLUTION INTRAVENOUS; SUBCUTANEOUS at 08:52

## 2020-04-11 RX ADMIN — MODAFINIL 100 MG: 100 TABLET ORAL at 08:54

## 2020-04-11 RX ADMIN — INSULIN LISPRO 2 UNITS: 100 INJECTION, SOLUTION INTRAVENOUS; SUBCUTANEOUS at 17:20

## 2020-04-11 RX ADMIN — INSULIN LISPRO 4 UNITS: 100 INJECTION, SOLUTION INTRAVENOUS; SUBCUTANEOUS at 20:58

## 2020-04-11 RX ADMIN — FUROSEMIDE 40 MG: 10 INJECTION, SOLUTION INTRAMUSCULAR; INTRAVENOUS at 17:19

## 2020-04-11 NOTE — PROGRESS NOTES
Continued Stay Note  [facility]     Patient Name: Gema Chapman  MRN: [id number]  Today's Date: 4/11/2020    Admit Date: 4/4/2020    Discharge Plan     Row Name 04/11/20 1117       Plan    Plan Comments  PT has recommended short term rehab at discharge. Pt will need to be quarantined here for 14 days prior to any SNF being able to look at referral. DORITA spoke with Celena, daughter, who states that pt and family are open to SNF placement. Celena is requesting referrals to be sent to [place]; first choice, [facility]; second choice, [facility]; third choice. Celena, daughter, is aware that referrals will be sent at a later date and that placement might be difficult due to pt having positive COVID-19 test even if 14 days have been completed. SW will follow case and assist with discharge planning.         Discharge Codes    No documentation.             Destiny Mondragon

## 2020-04-11 NOTE — NURSING NOTE
RN assisted patient up to chair for breakfast. Pt quickly has oxygen desaturations to 77 percent on 8L NC high flow. Pt instructed to deep breathe, Pt with persistent dry non-productive cough. Pt's oxygen saturated only recovered to 83 percent after 5 mins of rest and high flow NC increased  to 12 L NC. Linens were changed and patient was assisted back to bed. Oxygen saturation took approximately 30 minutes to return to baseline.

## 2020-04-11 NOTE — PROGRESS NOTES
"Infectious Diseases Progress Note    Patient:  Gema Chapman  YOB: 1940  MRN: [id number]   Admit date: 4/4/2020   Admitting Physician: Jesse Kilgore MD  Primary Care Physician: Duy Hall MD    Chief Complaint/Interval History: Was able to complete video visit with her.  She smiles during the video interview.  She always tells me she is doing better.  When I tried to press her for some of the objective information that makes her feel she is doing better, is hard to get any definite objective indication of improvement.  She does seem to be coughing a little bit less, but per nursing she does cough periodically.  She seems to breathe comfortably, but does desaturate with even minimal exertion, remains slow to recover, as is requiring anywhere from 6 to 12 L of oxygen per nasal cannula.  She reports no pain or discomfort.  She completed her course of hydroxychloroquine.    Intake/Output Summary (Last 24 hours) at 4/11/2020 1009  Last data filed at 4/10/2020 1732  Gross per 24 hour   Intake 850 ml   Output 1500 ml   Net -650 ml     Allergies: No Known Allergies  Current Scheduled Medications:     enoxaparin 30 mg Subcutaneous Q24H   FLUoxetine 10 mg Oral Daily   insulin lispro 2-7 Units Subcutaneous 4x Daily With Meals & Nightly   modafinil 100 mg Oral Daily   sodium chloride 10 mL Intravenous Q12H   zinc gluconate 100 mg Oral Daily     Current PRN Medications:  •  acetaminophen **OR** acetaminophen **OR** acetaminophen  •  dextrose  •  dextrose  •  glucagon (human recombinant)  •  ondansetron  •  sodium chloride    Review of Systems p.o. intake remains fairly small.  She got some Lasix yesterday with improvement in lower extremity edema.    Vital Signs:  BP 93/45 (BP Location: Left arm, Patient Position: Lying)   Pulse 53   Temp 95.5 °F (35.3 °C) (Oral) Comment (Src): 95.6 axillary  Resp 23   Ht 167.6 cm (66\")   Wt 95 kg (209 lb 8 oz)   SpO2 95%   BMI 33.81 kg/m²     Physical " Exam  Vital signs - reviewed.  Line/IV (per discussion with nursing IV site without erythema or induration) site - No erythema, warmth, induration, or tenderness.  General she is smiling and interactive.  She did not cough during video interview today.  She looks comfortable at rest.  Reviewed physical exam with nursing.  To avoid room traffic, PPE utilization, and additional exposure-personal physical exam at bedside not performed by me today.    Lab Results:  CBC: Results from last 7 days   Lab Units 04/11/20  0752 04/08/20  0802 04/06/20  0813 04/05/20  1045 04/04/20  1105   WBC 10*3/mm3 12.91* 11.88* 19.21* 18.77* 15.82*   HEMOGLOBIN g/dL 11.3* 10.6* 12.1 11.0* 11.5*   HEMATOCRIT % 34.1 31.5* 35.6 32.3* 34.1   PLATELETS 10*3/mm3 424 413 339 340 297     BMP:  Results from last 7 days   Lab Units 04/11/20  0752 04/09/20  0805 04/08/20  0802 04/06/20  0813 04/05/20  1045 04/04/20  1105   SODIUM mmol/L 141 140 136 135* 134* 132*   POTASSIUM mmol/L 4.1 3.9 4.0 4.6 4.3 4.3   CHLORIDE mmol/L 104 105 102 99 100 96*   CO2 mmol/L 26.0 22.0 20.0* 21.0* 20.0* 21.0*   BUN mg/dL 52* 68* 76* 51* 42* 51*   CREATININE mg/dL 1.47* 1.91* 2.33* 2.09* 1.74* 2.26*   GLUCOSE mg/dL 257* 215* 233* 231* 241* 226*   CALCIUM mg/dL 9.3 9.2 9.1 9.4 9.1 9.2   ALT (SGPT) U/L 220*  --  261* 414* 445* 183*     CK 29  Ferritin today 1358 (improved from 3905 days ago and 4467 6 days ago)  D-dimer today 5.83 which is worsened in comparison to 5 days ago when it was 3.6  LDH today 248 which has improved from 399 5 days ago and 517 6 days ago  Procalcitonin 0.3  C-reactive protein 3.1 (improved from 5 days ago when it was 23.8)    Interleukin-6 on April 6, 2020 was 149.2 (reference range 0-15 0.5)    Culture Results:   Blood Culture   Date Value Ref Range Status   04/04/2020 No growth at 5 days  Final   04/04/2020 No growth at 5 days  Final     Radiology: None  Additional Studies Reviewed: None    Impression:   1.  COVID-19 pneumonia-she continues to  subjectively indicates she is doing better.  Objectively from an oxygenation standpoint does not seem to have changed much.  Her lab parameters overall seem to be showing improvement with the exception of her d-dimer.  2.  Chronic renal failure-improved  3.  Diabetes mellitus  4.  Hypertension    Recommendations:   She completed hydroxychloroquine  Feel her care at this point primarily remain supportive  Hopefully her improvement in C-reactive protein, ferritin, LDH, and ALT are indicators that she is showing some trend in the right direction  Continue oxygen supplementation  Continue to follow    Drew Castaneda MD

## 2020-04-11 NOTE — DISCHARGE PLACEMENT REQUEST
"Gurpreet Chapman (80 y.o. Female)     Date of Birth Social Security Number Address Home Phone MRN    1940  [address]  [place] [address] [phone] [id number]    [employer] Marital Status          Orthodox        Admission Date Admission Type Admitting Provider Attending Provider Department, Room/Bed    4/4/20 Emergency Jesse Kilgore MD Fleming, John Eric, MD [address], 290/1    Discharge Date Discharge Disposition Discharge Destination                       Attending Provider:  Jesse Kilgore MD    Allergies:  No Known Allergies    Isolation:  Enh Drop/Con   Infection:  COVID (confirmed) (04/07/20)   Code Status:  No CPR    Ht:  167.6 cm (66\")   Wt:  95 kg (209 lb 8 oz)    Admission Cmt:  None   Principal Problem:  COVID-19 virus detected [U07.1]                 Active Insurance as of 4/4/2020     Primary Coverage     Payor Plan Insurance Group Employer/Plan Group    MEDICARE MEDICARE A & B      Payor Plan Address Payor Plan Phone Number Payor Plan Fax Number Effective Dates    [address] [phone]  2/1/2003 - None Entered    [place] [id number]       Subscriber Name Subscriber Birth Date Member ID       GURPREET CHAPMAN 1940 [id number]                 Emergency Contacts      (Rel.) Home Phone Work Phone Mobile Phone    Celena Shaver (Daughter) [phone] -- --              "

## 2020-04-11 NOTE — PROGRESS NOTES
"    [facility] Medicine Services  INPATIENT PROGRESS NOTE    Patient Name: Gema Chapman  Date of Admission: 4/4/2020  Today's Date: 04/11/20  Length of Stay: 7  Primary Care Physician: Duy Hall MD    Subjective   Chief Complaint: shortness of breath  Fever    Pertinent negatives include no abdominal pain, chest pain, coughing, diarrhea, nausea or vomiting.        This exam was performed remotely in the unit aided by real-time audio/visual communication tools and with assistance from nursing staff.      Patient is doing well without complaint.  When the try to get her to the chair, she desatted into the upper 70s.  They placed her on higher amount of high flow oxygen, and she was able to rebound.  She indicates that she did not feel short of breath when this occurred.  Patient has been hypoxic, without any significant issues of shortness of breath.  She is sounding much like the described, \"happy hypoxia\".  Discussed the case with the patient's daughter as well as her son-in-law.    Feel as though the patient is staying about the same, but I am concerned that she is still remaining this hypoxic.  There are reports that these cases, in which they remain stable with this her degree of hypoxia before having significant issues.  She has been day 7 of her hospitalization, per some of the literature review, patient often decline between days 8 and 10.  We will be monitoring her very closely.      Review of Systems   Constitutional: Positive for appetite change, fatigue and fever. Negative for activity change, chills and diaphoresis.   Respiratory: Negative for cough and shortness of breath.    Cardiovascular: Positive for leg swelling. Negative for chest pain and palpitations.   Gastrointestinal: Negative for abdominal distention, abdominal pain, constipation, diarrhea, nausea and vomiting.        All pertinent negatives and positives are as above. All other systems have " been reviewed and are negative unless otherwise stated.     Objective    Temp:  [95.5 °F (35.3 °C)-98 °F (36.7 °C)] 97.6 °F (36.4 °C)  Heart Rate:  [53-80] 70  Resp:  [21-27] 27  BP: ()/(45-59) 100/49  Physical Exam   Constitutional: She is oriented to person, place, and time. No distress.   HENT:   Head: Normocephalic and atraumatic.   Eyes: Conjunctivae are normal. No scleral icterus.   Neck: Neck supple.   Cardiovascular: Normal rate and regular rhythm.   Review of telemetry    Pulmonary/Chest: Effort normal. No respiratory distress.   Musculoskeletal: She exhibits edema.   Neurological: She is alert and oriented to person, place, and time.   Skin: She is not diaphoretic. No erythema. There is pallor.   Psychiatric: She has a normal mood and affect. Her behavior is normal.   Nursing note and vitals reviewed.      This exam was performed remotely in the unit aided by real-time audio/visual communication tools and with assistance from nursing staff.        Results Review:  I have reviewed the labs, radiology results, and diagnostic studies.    Laboratory Data:   Results from last 7 days   Lab Units 04/11/20  0752 04/08/20  0802 04/06/20  0813   WBC 10*3/mm3 12.91* 11.88* 19.21*   HEMOGLOBIN g/dL 11.3* 10.6* 12.1   HEMATOCRIT % 34.1 31.5* 35.6   PLATELETS 10*3/mm3 424 413 339        Results from last 7 days   Lab Units 04/11/20  0752 04/09/20  0805 04/08/20  0802 04/06/20  0813   SODIUM mmol/L 141 140 136 135*   POTASSIUM mmol/L 4.1 3.9 4.0 4.6   CHLORIDE mmol/L 104 105 102 99   CO2 mmol/L 26.0 22.0 20.0* 21.0*   BUN mg/dL 52* 68* 76* 51*   CREATININE mg/dL 1.47* 1.91* 2.33* 2.09*   CALCIUM mg/dL 9.3 9.2 9.1 9.4   BILIRUBIN mg/dL 0.6  --  0.5 0.9   ALK PHOS U/L 145*  --  139* 151*   ALT (SGPT) U/L 220*  --  261* 414*   AST (SGOT) U/L 83*  --  164* 259*   GLUCOSE mg/dL 257* 215* 233* 231*       Culture Data:   Blood Culture   Date Value Ref Range Status   04/04/2020 No growth at 2 days  Preliminary   04/04/2020  No growth at 2 days  Preliminary       Radiology Data:   Imaging Results (Last 24 Hours)     ** No results found for the last 24 hours. **          I have reviewed the patient's current medications.     Assessment/Plan     Active Hospital Problems    Diagnosis   • **COVID-19 virus detected   • Essential hypertension   • Vascular dementia without behavioral disturbance (CMS/HCC)   • Reactive depression   • Obesity (BMI 30-39.9)   • Elevated LFTs   • Elevated LDH   • Elevated ferritin   • CKD (chronic kidney disease) stage 4, GFR 15-29 ml/min (CMS/HCC)   • Febrile illness   • Type 2 diabetes mellitus (CMS/HCC)       Plan:  1.  Consulted ID, appreciate their assistance.  Case discussed with ID.  2.  Completed hydroxychloroquine 400 mg BID on day 1, followed by 200 mg BID for 4 days    3.  QTc reviewed.  Continue current therapy  4.  Sliding scale insulin with qac/qhs accuchecks, blood glucose reviewed; add 10 units of levemir   5.  Supportive care with fluid restrictive strategy   7.  AM lab holiday   8.  Lovenox 30 mg q24h for DVT PPx   9.  Continue zinc, 100 mg daily, this is based on anecdotal evidence, but little harm associated with treatment.  10.  Furosemide 40 mg IV x 1 again today   11.  Discussed case at length with palliative care, they aided her in a [place] MOST form   12.  PT/OT following via telehealth visits - Appreciate assistance   13.  Continue magic cups and boost   14.  Continue fluoxetine 10 mg daily   15.  Will continue the provigil.  Dr. Hall had a lengthy discussion with the patients POA/daughter and they decided the risk outweigh the benefits.  This may only be temporary as the patient gets to feeling better energy/mood may improve.      Will consider reswabbing the patient in the middle of the next week (Tuesday/Wednesday) to evaluate viral clearance for placement.     Ongoing discussions with family as well as social work in regards to working on placement.  We are unsure what each facilities  rules are going to be in regards to placement of a COVID-19 positive patient.      Discharge Planning: I expect the patient to be discharged to ? in ? days.    Jesse Kilgore MD   04/11/20   12:46       Laboratory Evaluation:  Lab Results   Component Value Date    WBC 12.91 (H) 04/11/2020    WBC 11.88 (H) 04/08/2020    LYMPHORELPCT 10.9 (L) 04/11/2020    LYMPHORELPCT 9.5 (L) 04/08/2020    LYMPHSABS 1.41 04/11/2020    LYMPHSABS 1.13 04/08/2020     (H) 04/11/2020     (H) 04/06/2020    CRP 3.07 (H) 04/11/2020    CRP 23.83 (H) 04/06/2020    PROCALCITO 0.30 (H) 04/11/2020    PROCALCITO 1.42 (H) 04/06/2020    FERRITIN 1,358.00 (H) 04/11/2020    FERRITIN 3,900.00 (H) 04/06/2020    DDIMER 5.83 (H) 04/11/2020    DDIMER 3.64 (H) 04/06/2020    CKTOTAL 29 04/11/2020    AST 83 (H) 04/11/2020     (H) 04/08/2020     (H) 04/11/2020     (H) 04/08/2020     White blood cell count went from 11.88-12.91.  Patient's lymphocyte count improved from 9.5-10.9.  Absolute lymphocyte count continues to improve slightly as well.  LDH, CRP, procalcitonin, ferritin all have improved.  AST and ALT have also improved.

## 2020-04-11 NOTE — PROGRESS NOTES
Continued Stay Note  [facility]     Patient Name: Gema Chapman  MRN: [id number]  Today's Date: 4/11/2020    Admit Date: 4/4/2020    Discharge Plan     Row Name 04/11/20 1415       Plan    Plan Comments  SW spoke with Jade Lund, Director of , in regards to placement. [facility] process is that pt has to be past 7 days of diagnosis, 2 negative COVID-19 tests, and afebrile for 48 hours prior to admitting to their facility. SW will be in contact with [facility] and [place] on Monday to determine processes for them as well. Referrals have been faxed. SW will follow for possible bed offers.         Discharge Codes    No documentation.             Destiyn Mondragon

## 2020-04-11 NOTE — PLAN OF CARE
Problem: Patient Care Overview  Goal: Plan of Care Review  Outcome: Ongoing (interventions implemented as appropriate)  Flowsheets (Taken 4/11/2020 0762)  Progress: improving  Plan of Care Reviewed With: patient  Outcome Summary: Pt denies pain. She desats with any movement and is currently on 6L high flow nasal cannula. Monitoring QTc check chart. Pt visibly looks better than the previous night. Can be incontinent at times. Still has frequent dry nonproductive cough. Isolation precautions maintained. Will cont to monitor.

## 2020-04-12 LAB
GLUCOSE BLDC GLUCOMTR-MCNC: 172 MG/DL (ref 70–130)
GLUCOSE BLDC GLUCOMTR-MCNC: 195 MG/DL (ref 70–130)
GLUCOSE BLDC GLUCOMTR-MCNC: 212 MG/DL (ref 70–130)
GLUCOSE BLDC GLUCOMTR-MCNC: 245 MG/DL (ref 70–130)

## 2020-04-12 PROCEDURE — 25010000002 MAGNESIUM SULFATE 2 GM/50ML SOLUTION: Performed by: INTERNAL MEDICINE

## 2020-04-12 PROCEDURE — 63710000001 INSULIN DETEMIR PER 5 UNITS: Performed by: INTERNAL MEDICINE

## 2020-04-12 PROCEDURE — 25010000002 ENOXAPARIN PER 10 MG: Performed by: FAMILY MEDICINE

## 2020-04-12 PROCEDURE — 82962 GLUCOSE BLOOD TEST: CPT

## 2020-04-12 PROCEDURE — 63710000001 INSULIN LISPRO (HUMAN) PER 5 UNITS: Performed by: FAMILY MEDICINE

## 2020-04-12 RX ORDER — MAGNESIUM SULFATE HEPTAHYDRATE 40 MG/ML
2 INJECTION, SOLUTION INTRAVENOUS ONCE
Status: COMPLETED | OUTPATIENT
Start: 2020-04-12 | End: 2020-04-12

## 2020-04-12 RX ADMIN — INSULIN LISPRO 2 UNITS: 100 INJECTION, SOLUTION INTRAVENOUS; SUBCUTANEOUS at 16:39

## 2020-04-12 RX ADMIN — Medication 100 MG: at 08:01

## 2020-04-12 RX ADMIN — ENOXAPARIN SODIUM 30 MG: 30 INJECTION SUBCUTANEOUS at 20:43

## 2020-04-12 RX ADMIN — INSULIN LISPRO 2 UNITS: 100 INJECTION, SOLUTION INTRAVENOUS; SUBCUTANEOUS at 11:43

## 2020-04-12 RX ADMIN — SODIUM CHLORIDE, PRESERVATIVE FREE 10 ML: 5 INJECTION INTRAVENOUS at 08:01

## 2020-04-12 RX ADMIN — SODIUM CHLORIDE, PRESERVATIVE FREE 10 ML: 5 INJECTION INTRAVENOUS at 20:53

## 2020-04-12 RX ADMIN — INSULIN DETEMIR 10 UNITS: 100 INJECTION, SOLUTION SUBCUTANEOUS at 21:49

## 2020-04-12 RX ADMIN — INSULIN LISPRO 2 UNITS: 100 INJECTION, SOLUTION INTRAVENOUS; SUBCUTANEOUS at 20:52

## 2020-04-12 RX ADMIN — MAGNESIUM SULFATE HEPTAHYDRATE 2 G: 40 INJECTION, SOLUTION INTRAVENOUS at 07:57

## 2020-04-12 RX ADMIN — INSULIN LISPRO 3 UNITS: 100 INJECTION, SOLUTION INTRAVENOUS; SUBCUTANEOUS at 08:01

## 2020-04-12 RX ADMIN — MODAFINIL 100 MG: 100 TABLET ORAL at 08:01

## 2020-04-12 NOTE — NURSING NOTE
Dr. Kilgore notified of patient episode of SB down to 30bpm with increased to SB 40-50's. Pt is asymptomatic and currently appears asleep. Other VS are stable. Afebrile. MD entering orders in Epic. Continue to monitor.

## 2020-04-12 NOTE — PROGRESS NOTES
[facility] Medicine Services  INPATIENT PROGRESS NOTE    Patient Name: Gema Chapman  Date of Admission: 4/4/2020  Today's Date: 04/12/20  Length of Stay: 8  Primary Care Physician: Duy Hall MD    Subjective   Chief Complaint: shortness of breath  HPI     Patient was seen and examined at bedside.  The patient is overall doing well.  She remains on 6 to 8 L of high flow nasal cannula.  The patient has no wheezes on examination at bedside.  The patient indicates that she is eating better.  The nurses found out that she likes cottage cheese as well as fruits.  She is going to order this for most of her lunches.  The patient told me she likes this as well.  Patient denies any sensation of shortness of breath.  She denies any nausea vomiting or diarrhea.  Her pitting edema has improved      Review of Systems   Constitutional: Negative for activity change, appetite change, chills, diaphoresis, fatigue and fever.   Respiratory: Positive for cough. Negative for shortness of breath.    Cardiovascular: Positive for leg swelling. Negative for palpitations.   Gastrointestinal: Negative for abdominal distention and constipation.        All pertinent negatives and positives are as above. All other systems have been reviewed and are negative unless otherwise stated.     Objective    Temp:  [96.7 °F (35.9 °C)-98.5 °F (36.9 °C)] 96.7 °F (35.9 °C)  Heart Rate:  [52-75] 71  Resp:  [20-25] 20  BP: ()/(51-66) 121/66  Physical Exam   Constitutional: She is oriented to person, place, and time. No distress.   HENT:   Head: Normocephalic and atraumatic.   Eyes: Conjunctivae are normal. No scleral icterus.   Neck: Neck supple. No JVD present.   Cardiovascular: Normal rate, regular rhythm and intact distal pulses.   No murmur heard.  Pulmonary/Chest: Effort normal. No stridor. No respiratory distress. She has wheezes (mild end expiratory).   Abdominal: Soft. Bowel sounds are normal.  She exhibits no distension. There is no tenderness. There is no guarding.   Musculoskeletal: She exhibits edema.   Neurological: She is alert and oriented to person, place, and time.   Skin: She is not diaphoretic. No erythema. There is pallor.   Psychiatric: She has a normal mood and affect. Her behavior is normal.   Nursing note and vitals reviewed.          Results Review:  I have reviewed the labs, radiology results, and diagnostic studies.    Laboratory Data:   Results from last 7 days   Lab Units 04/11/20 0752 04/08/20  0802 04/06/20  0813   WBC 10*3/mm3 12.91* 11.88* 19.21*   HEMOGLOBIN g/dL 11.3* 10.6* 12.1   HEMATOCRIT % 34.1 31.5* 35.6   PLATELETS 10*3/mm3 424 413 339        Results from last 7 days   Lab Units 04/11/20 0752 04/09/20  0805 04/08/20  0802 04/06/20  0813   SODIUM mmol/L 141 140 136 135*   POTASSIUM mmol/L 4.1 3.9 4.0 4.6   CHLORIDE mmol/L 104 105 102 99   CO2 mmol/L 26.0 22.0 20.0* 21.0*   BUN mg/dL 52* 68* 76* 51*   CREATININE mg/dL 1.47* 1.91* 2.33* 2.09*   CALCIUM mg/dL 9.3 9.2 9.1 9.4   BILIRUBIN mg/dL 0.6  --  0.5 0.9   ALK PHOS U/L 145*  --  139* 151*   ALT (SGPT) U/L 220*  --  261* 414*   AST (SGOT) U/L 83*  --  164* 259*   GLUCOSE mg/dL 257* 215* 233* 231*       Culture Data:   Blood Culture   Date Value Ref Range Status   04/04/2020 No growth at 2 days  Preliminary   04/04/2020 No growth at 2 days  Preliminary       Radiology Data:   Imaging Results (Last 24 Hours)     ** No results found for the last 24 hours. **          I have reviewed the patient's current medications.     Assessment/Plan     Active Hospital Problems    Diagnosis   • **COVID-19 virus detected   • Essential hypertension   • Vascular dementia without behavioral disturbance (CMS/HCC)   • Reactive depression   • Obesity (BMI 30-39.9)   • Elevated LFTs   • Elevated LDH   • Elevated ferritin   • CKD (chronic kidney disease) stage 4, GFR 15-29 ml/min (CMS/HCC)   • Febrile illness   • Type 2 diabetes mellitus  (CMS/[facility])       Plan:  1.  Consulted ID, appreciate their assistance.   2.  Completed hydroxychloroquine 400 mg BID on day 1, followed by 200 mg BID for 4 days    3.  QTc reviewed, stable.  Was bradycardic while sleeping with prolonged QTc but normal while awake  4.  Sliding scale insulin with qac/qhs accuchecks, blood glucose reviewed; continue 10 units of levemir, will likely need to increase   5.  Supportive care with fluid restrictive strategy   7.  AM lab holiday   8.  Lovenox 30 mg q24h for DVT PPx   9.  Continue zinc, 100 mg daily, this is based on anecdotal evidence, but little harm associated with treatment.  10.  PRN diuresis   11.  Discussed case at length with palliative care, they aided her in a [place] MOST form   12.  PT/OT following via telehealth visits - Appreciate assistance   13.  Continue magic cups and boost   14.  Continue fluoxetine 10 mg daily   15.  Will continue the provigil.  Dr. Hall had a lengthy discussion with the patients POA/daughter and they decided the risk outweigh the benefits.  This may only be temporary as the patient gets to feeling better energy/mood may improve.    16.  Wean O2 for sat > 90%    Will consider reswabbing the patient in the middle of the next week (Tuesday/Wednesday) to evaluate viral clearance for placement.     Ongoing discussions with family as well as social work in regards to working on placement.  We are unsure what each [facility] rules are going to be in regards to placement of a COVID-19 positive patient.  First choice is [facility], second choice is [facility].      Discharge Planning: I expect the patient to be discharged to ? in ? days.    Jesse Kilgore MD   04/12/20   15:02

## 2020-04-12 NOTE — PROGRESS NOTES
Infectious Diseases Progress Note    Patient:  Gema Chapman  YOB: 1940  MRN: [id number]   Admit date: 4/4/2020   Admitting Physician: Jesse Kilgore MD  Primary Care Physician: Duy Hall MD    Chief Complaint/Interval History: Each day I speak with her she indicates she is getting better.  Sometimes when I asked her specific questions she will then indicate her symptoms are about the same.  She is on 6 L of oxygen.  She does desaturate with exertion and remains somewhat slow to recover.  I get the sense that problem is about the same.  One thing that both the patient and the nurse felt had improved with her appetite and she is clearly eating more.  Her energy level seems slightly better.  Although patient says her cough is the same, she is not coughing at all during interview and that represents a clear improvement from earlier in hospital stay.  She has also been afebrile now for the past 3 days which is also an improvement.  She had some bradycardia earlier today when sleeping.  Her QT corrected was above 500 ms.  She is received some magnesium supplementation.  She is not bradycardic currently.  Current QT corrected 520 ms.    Intake/Output Summary (Last 24 hours) at 4/12/2020 1432  Last data filed at 4/12/2020 1137  Gross per 24 hour   Intake --   Output 1125 ml   Net -1125 ml     Allergies: No Known Allergies  Current Scheduled Medications:     enoxaparin 30 mg Subcutaneous Q24H   FLUoxetine 10 mg Oral Daily   insulin detemir 10 Units Subcutaneous Nightly   insulin lispro 2-7 Units Subcutaneous 4x Daily With Meals & Nightly   modafinil 100 mg Oral Daily   sodium chloride 10 mL Intravenous Q12H   zinc gluconate 100 mg Oral Daily     Current PRN Medications:  •  acetaminophen **OR** acetaminophen **OR** acetaminophen  •  dextrose  •  dextrose  •  glucagon (human recombinant)  •  ondansetron  •  sodium chloride    Review of Systems    Vital Signs:  /66 (BP Location: Left  "arm, Patient Position: Lying)   Pulse 71   Temp 96.7 °F (35.9 °C) (Axillary)   Resp 20   Ht 167.6 cm (66\")   Wt 95 kg (209 lb 8 oz)   SpO2 96%   BMI 33.81 kg/m²     Physical Exam  Vital signs - reviewed.  Line/IV site - No erythema, warmth, induration, or tenderness.  Face-to-face exam not performed.  Exam performed via video with nursing assistance.  She looks comfortable.  Lower extremities with 1+ edema  IV site without erythema  General smiling and interactive  She had looked more tachypneic previously.  Felt she looked improved today.    Lab Results:  CBC: Results from last 7 days   Lab Units 04/11/20  0752 04/08/20  0802 04/06/20  0813   WBC 10*3/mm3 12.91* 11.88* 19.21*   HEMOGLOBIN g/dL 11.3* 10.6* 12.1   HEMATOCRIT % 34.1 31.5* 35.6   PLATELETS 10*3/mm3 424 413 339     BMP:  Results from last 7 days   Lab Units 04/11/20  0752 04/09/20  0805 04/08/20  0802 04/06/20  0813   SODIUM mmol/L 141 140 136 135*   POTASSIUM mmol/L 4.1 3.9 4.0 4.6   CHLORIDE mmol/L 104 105 102 99   CO2 mmol/L 26.0 22.0 20.0* 21.0*   BUN mg/dL 52* 68* 76* 51*   CREATININE mg/dL 1.47* 1.91* 2.33* 2.09*   GLUCOSE mg/dL 257* 215* 233* 231*   CALCIUM mg/dL 9.3 9.2 9.1 9.4   ALT (SGPT) U/L 220*  --  261* 414*     Culture Results: None  Radiology: None  Additional Studies Reviewed: None    Impression:   1.  COVID-19 pneumonia.  Subjectively continues to feel a little bit better.  Objectively looks the same or a little bit better.  2.  Chronic renal failure-stable/improved as of yesterday-repeat lab in a.m.  3.  QTc corrected prolonged-we will check with pharmacy and will discontinue any medications that could contribute to prolonged QT interval.  QT interval somewhat hard to calculate with her atrial fibrillation.  Previously completed treatment with hydroxychloroquine.  4.  Diabetes mellitus  5.  Hypertension    Recommendations:   Continue supportive care  Encouraging oral intake  Lab in a.m.  Continue to follow    Drew Castaneda, " MD

## 2020-04-12 NOTE — PLAN OF CARE
Problem: Patient Care Overview  Goal: Plan of Care Review  Outcome: Ongoing (interventions implemented as appropriate)  Flowsheets (Taken 4/12/2020 9141)  Outcome Summary: Denies pain., Turns self O2 at 8l.     Problem: Diabetes, Type 2 (Adult)  Goal: Signs and Symptoms of Listed Potential Problems Will be Absent, Minimized or Managed (Diabetes, Type 2)  Outcome: Ongoing (interventions implemented as appropriate)  Flowsheets (Taken 4/6/2020 3861 by Diana Figueroa, RN)  Problems Assessed (Type 2 Diabetes): all  Problems Present (Type 2 Diabetes): none  Note:   Started levimer to help control sugar,

## 2020-04-13 LAB
ALBUMIN SERPL-MCNC: 2.9 G/DL (ref 3.5–5.2)
ALBUMIN/GLOB SERPL: 0.7 G/DL
ALP SERPL-CCNC: 137 U/L (ref 39–117)
ALT SERPL W P-5'-P-CCNC: 186 U/L (ref 1–33)
ANION GAP SERPL CALCULATED.3IONS-SCNC: 11 MMOL/L (ref 5–15)
AST SERPL-CCNC: 72 U/L (ref 1–32)
BILIRUB SERPL-MCNC: 0.5 MG/DL (ref 0.2–1.2)
BUN BLD-MCNC: 44 MG/DL (ref 8–23)
BUN/CREAT SERPL: 31 (ref 7–25)
CALCIUM SPEC-SCNC: 9.6 MG/DL (ref 8.6–10.5)
CHLORIDE SERPL-SCNC: 103 MMOL/L (ref 98–107)
CO2 SERPL-SCNC: 28 MMOL/L (ref 22–29)
CREAT BLD-MCNC: 1.42 MG/DL (ref 0.57–1)
CRP SERPL-MCNC: 1.34 MG/DL (ref 0–0.5)
D DIMER PPP FEU-MCNC: 7.29 MG/L (FEU) (ref 0–0.5)
FERRITIN SERPL-MCNC: 1083 NG/ML (ref 13–150)
GFR SERPL CREATININE-BSD FRML MDRD: 35 ML/MIN/1.73
GLOBULIN UR ELPH-MCNC: 4 GM/DL
GLUCOSE BLD-MCNC: 198 MG/DL (ref 65–99)
GLUCOSE BLDC GLUCOMTR-MCNC: 171 MG/DL (ref 70–130)
GLUCOSE BLDC GLUCOMTR-MCNC: 185 MG/DL (ref 70–130)
GLUCOSE BLDC GLUCOMTR-MCNC: 186 MG/DL (ref 70–130)
GLUCOSE BLDC GLUCOMTR-MCNC: 245 MG/DL (ref 70–130)
GLUCOSE BLDC GLUCOMTR-MCNC: 380 MG/DL (ref 70–130)
HOLD SPECIMEN: NORMAL
MAGNESIUM SERPL-MCNC: 2.3 MG/DL (ref 1.6–2.4)
POTASSIUM BLD-SCNC: 4.5 MMOL/L (ref 3.5–5.2)
PROT SERPL-MCNC: 6.9 G/DL (ref 6–8.5)
SODIUM BLD-SCNC: 142 MMOL/L (ref 136–145)
WHOLE BLOOD HOLD SPECIMEN: NORMAL

## 2020-04-13 PROCEDURE — 82962 GLUCOSE BLOOD TEST: CPT

## 2020-04-13 PROCEDURE — 80053 COMPREHEN METABOLIC PANEL: CPT | Performed by: INTERNAL MEDICINE

## 2020-04-13 PROCEDURE — 83735 ASSAY OF MAGNESIUM: CPT | Performed by: INTERNAL MEDICINE

## 2020-04-13 PROCEDURE — 63710000001 INSULIN LISPRO (HUMAN) PER 5 UNITS: Performed by: FAMILY MEDICINE

## 2020-04-13 PROCEDURE — 85379 FIBRIN DEGRADATION QUANT: CPT | Performed by: INTERNAL MEDICINE

## 2020-04-13 PROCEDURE — 82728 ASSAY OF FERRITIN: CPT | Performed by: INTERNAL MEDICINE

## 2020-04-13 PROCEDURE — 63710000001 INSULIN DETEMIR PER 5 UNITS: Performed by: INTERNAL MEDICINE

## 2020-04-13 PROCEDURE — 86140 C-REACTIVE PROTEIN: CPT | Performed by: INTERNAL MEDICINE

## 2020-04-13 PROCEDURE — 25010000002 ENOXAPARIN PER 10 MG: Performed by: FAMILY MEDICINE

## 2020-04-13 RX ADMIN — SODIUM CHLORIDE, PRESERVATIVE FREE 10 ML: 5 INJECTION INTRAVENOUS at 21:02

## 2020-04-13 RX ADMIN — FLUOXETINE 10 MG: 10 CAPSULE ORAL at 08:09

## 2020-04-13 RX ADMIN — INSULIN LISPRO 6 UNITS: 100 INJECTION, SOLUTION INTRAVENOUS; SUBCUTANEOUS at 21:02

## 2020-04-13 RX ADMIN — INSULIN LISPRO 2 UNITS: 100 INJECTION, SOLUTION INTRAVENOUS; SUBCUTANEOUS at 12:05

## 2020-04-13 RX ADMIN — INSULIN LISPRO 2 UNITS: 100 INJECTION, SOLUTION INTRAVENOUS; SUBCUTANEOUS at 08:13

## 2020-04-13 RX ADMIN — MODAFINIL 100 MG: 100 TABLET ORAL at 08:09

## 2020-04-13 RX ADMIN — INSULIN LISPRO 3 UNITS: 100 INJECTION, SOLUTION INTRAVENOUS; SUBCUTANEOUS at 17:38

## 2020-04-13 RX ADMIN — INSULIN DETEMIR 12 UNITS: 100 INJECTION, SOLUTION SUBCUTANEOUS at 22:13

## 2020-04-13 RX ADMIN — ENOXAPARIN SODIUM 30 MG: 30 INJECTION SUBCUTANEOUS at 17:38

## 2020-04-13 RX ADMIN — SODIUM CHLORIDE, PRESERVATIVE FREE 10 ML: 5 INJECTION INTRAVENOUS at 08:09

## 2020-04-13 RX ADMIN — Medication 100 MG: at 08:09

## 2020-04-13 NOTE — PROGRESS NOTES
Continued Stay Note  [facility]     Patient Name: Gema Chapman  MRN: [id number]  Today's Date: 4/13/2020    Admit Date: 4/4/2020    Discharge Plan     Row Name 04/13/20 1140       Plan    Plan  SNF placement    Plan Comments  Referrals have been made to [facility], [facility] and [facility] per family request.  [facility] is currently in the process of developing protocol to be able to accept patients from hospital that have tested COVID positive.  As previously stated, [facility] will require two consecutive negative COVID 19 tests (24 - 48 hours apart confirming time frame requirements as CDC guideline is reportedly 48 hours apart).  And a minimum of 72 hours afebrile (without fever reducing meds).  Bed offers made are tentative until all medical clearances met due to bed availability as COVID positive patients coming from the hospital require a private room.  Physician has been informed of SNF requirements.        Discharge Codes    No documentation.             JOELLEN Parrish

## 2020-04-13 NOTE — PROGRESS NOTES
"Palliative Care Daily Progress Note     other Chart review    Code Status:   Code Status and Medical Interventions:   Ordered at: 04/09/20 1113     Limited Support to NOT Include:    Intubation    Cardioversion/Defibrillation    Dialysis     Level Of Support Discussed With:    Patient     Code Status:    No CPR     Medical Interventions (Level of Support Prior to Arrest):    Limited     Comments:    no PEG      Advanced Directives: Advance Directive Status: Patient does not have advance directive   Goals of Care: Ongoing.     S: Medical record reviewed. Events noted.  Awaiting SNF placement.    O:       Intake/Output Summary (Last 24 hours) at 4/13/2020 1507  Last data filed at 4/13/2020 0531  Gross per 24 hour   Intake 800 ml   Output 600 ml   Net 200 ml       Diagnostics: Reviewed    Patient Active Problem List   Diagnosis   • Febrile illness   • COVID-19 virus detected   • Type 2 diabetes mellitus (CMS/HCC)   • CKD (chronic kidney disease) stage 4, GFR 15-29 ml/min (CMS/HCC)   • Obesity (BMI 30-39.9)   • Elevated LFTs   • Elevated LDH   • Elevated ferritin   • Essential hypertension   • Vascular dementia without behavioral disturbance (CMS/[facility])   • Reactive depression       Physical Exam:    BP (!) 82/56   Pulse (!) 48   Temp 96.4 °F (35.8 °C) (Oral)   Resp 27   Ht 167.6 cm (66\")   Wt 95 kg (209 lb 8 oz)   SpO2 90%   BMI 33.81 kg/m²     Patient status: Disease state: Controlled with current treatments.  Functional status: Palliative Performance Scale Score: Performance 30% based on the following measures: Ambulation: Totally bed bound, Activity and Evidence of Disease: Unable to do any work, extensive evidence of disease, Self-Care: Total care required,  Intake: Reduced, LOC: Full, drowsy or confusion   Nutritional status: Albumin 2.40. Body mass index is 33.81 kg/m².      Family support: The patient receives support from her daughter and extended family..  POA/Healthcare surrogate-no advance directive on " file     Impression/Problem List:     1.  COVID-19 virus detected  2.  Acute kidney injury on chronic kidney disease stage IV  3.  Type 2 diabetes mellitus  4.  Advanced age  5.  Hypertension  6.  Elevated LFTs  7.  Elevated LDH  8.  Elevated ferritin  9.  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 plan: Goals of care include CODE STATUS no CPR/limited interventions.      2.   Palliative care encounter  -Awaiting SNF placement for rehab. We will have to await COVID negative status per SNF before acceptance in facility.  -4/9 Would benefit from completion of the MOST form.  A decision aid and copy of document has been provided.  -We will sign off. Please re-consult if needs arise.         Thank you for allowing us to participate in patient's plan of care.     Time spent: 10 minutes spent reviewing medical and medication records, assessing and examining patient, discussing with family, answering questions, providing some guidance about a plan and documentation of care, and coordinating care with other healthcare members, with > 50% time spent face to face.       Maria Victoria Callaway, APRN  4/13/2020

## 2020-04-13 NOTE — PROGRESS NOTES
"Infectious Diseases Progress Note    Patient:  Gema Chapman  YOB: 1940  MRN: [id number]   Admit date: 4/4/2020   Admitting Physician: Michael Huynh MD  Primary Care Physician: Duy Hall MD    Chief Complaint/Interval History: She indicates she is feeling a little bit better.  She is on less oxygen today overall than she was yesterday.  Desaturates with exertion but I get the sense she is  Recovering slightly more quickly.oral intake remains marginal.     Intake/Output Summary (Last 24 hours) at 4/13/2020 1725  Last data filed at 4/13/2020 1215  Gross per 24 hour   Intake 360 ml   Output 600 ml   Net -240 ml     Allergies: No Known Allergies  Current Scheduled Medications:     enoxaparin 30 mg Subcutaneous Q24H   FLUoxetine 10 mg Oral Daily   insulin detemir 12 Units Subcutaneous Nightly   insulin lispro 2-7 Units Subcutaneous 4x Daily With Meals & Nightly   modafinil 100 mg Oral Daily   sodium chloride 10 mL Intravenous Q12H   zinc gluconate 100 mg Oral Daily     Current PRN Medications:  •  acetaminophen **OR** acetaminophen **OR** acetaminophen  •  dextrose  •  dextrose  •  glucagon (human recombinant)  •  ondansetron  •  sodium chloride    Review of Systems had loose stool this morning.  No urinary symptoms.    Vital Signs:  BP 96/46 (BP Location: Left arm, Patient Position: Lying)   Pulse 57   Temp 96.4 °F (35.8 °C) (Oral)   Resp 20   Ht 167.6 cm (66\")   Wt 95 kg (209 lb 8 oz)   SpO2 93%   BMI 33.81 kg/m²     Physical Exam  Vital signs - reviewed.  Appointment completed via telemedicine video visit from the unit.  She looks stronger today.  Seems to be improving.  She did not cough during today's interview.  She seemed to be breathing comfortably at present.    Lab Results:  CBC: Results from last 7 days   Lab Units 04/11/20  0752 04/08/20  0802   WBC 10*3/mm3 12.91* 11.88*   HEMOGLOBIN g/dL 11.3* 10.6*   HEMATOCRIT % 34.1 31.5*   PLATELETS 10*3/mm3 424 413 "     BMP:  Results from last 7 days   Lab Units 04/13/20  0832 04/11/20  0752 04/09/20  0805 04/08/20  0802   SODIUM mmol/L 142 141 140 136   POTASSIUM mmol/L 4.5 4.1 3.9 4.0   CHLORIDE mmol/L 103 104 105 102   CO2 mmol/L 28.0 26.0 22.0 20.0*   BUN mg/dL 44* 52* 68* 76*   CREATININE mg/dL 1.42* 1.47* 1.91* 2.33*   GLUCOSE mg/dL 198* 257* 215* 233*   CALCIUM mg/dL 9.6 9.3 9.2 9.1   ALT (SGPT) U/L 186* 220*  --  261*     D-dimer 7.3  C-reactive protein 1.3  Ferritin 1083    Culture Results: No results found for: BLOODCX, URINECX, WOUNDCX, MRSACX, RESPCX, STOOLCX  Radiology: None  Additional Studies Reviewed: QT corrected today 492    Impression:   1.  COVID-19 pneumonia.  May be slight improvement in oxygen requirements.  2.  Chronic renal failure-stable creatinine  3.  QT prolonged-improved.  QT corrected today was 492  4.  Diabetes mellitus  5.  Hypertension    Recommendations:   Clinically she seems to be showing improvement.  She is on less FiO2 today  Laboratory markers of inflammation somewhat variable-improving CRP and improving ferritin but increasing d-dimer  Feel her supportive care is appropriate  Clinically she looks a little bit better today  Continue current treatment    Drew Castaneda MD

## 2020-04-13 NOTE — PLAN OF CARE
Problem: Patient Care Overview  Goal: Plan of Care Review  Outcome: Ongoing (interventions implemented as appropriate)  Flowsheets (Taken 4/13/2020 3890)  Progress: no change  Plan of Care Reviewed With: patient  Note:   Patient denies pain. Patient sat up in chair for two hours today. RN assisted patient with setting up meal tray; patient still noted to have a decrease appetite; RN promoted oral hydration. Skin care provided; pur wick intact; barrier cream applied. Patient oxygen decreased to 3.5L NC than once patient ambulated to chair patient O2 decreased to 70's and unable to maintain saturations; RN had to increase 02 back to 5L NC, MD made aware. Safety Maintained. Isolation precautions continued. Will continue to monitor. QTc monitoring. Lynette Luis RN    Goal: Individualization and Mutuality  Outcome: Ongoing (interventions implemented as appropriate)  Goal: Discharge Needs Assessment  Outcome: Ongoing (interventions implemented as appropriate)  Goal: Interprofessional Rounds/Family Conf  Outcome: Ongoing (interventions implemented as appropriate)     Problem: Infection, Risk/Actual (Adult)  Goal: Identify Related Risk Factors and Signs and Symptoms  Outcome: Ongoing (interventions implemented as appropriate)  Goal: Infection Prevention/Resolution  Outcome: Ongoing (interventions implemented as appropriate)     Problem: Diabetes, Type 2 (Adult)  Goal: Signs and Symptoms of Listed Potential Problems Will be Absent, Minimized or Managed (Diabetes, Type 2)  Outcome: Ongoing (interventions implemented as appropriate)     Problem: Breathing Pattern Ineffective (Adult)  Goal: Effective Oxygenation/Ventilation  Outcome: Ongoing (interventions implemented as appropriate)  Goal: Anxiety/Fear Reduction  Outcome: Ongoing (interventions implemented as appropriate)     Problem: Fall Risk (Adult)  Goal: Identify Related Risk Factors and Signs and Symptoms  Outcome: Ongoing (interventions implemented as  appropriate)  Goal: Absence of Fall  Outcome: Ongoing (interventions implemented as appropriate)     Problem: Skin Injury Risk (Adult)  Goal: Identify Related Risk Factors and Signs and Symptoms  Outcome: Ongoing (interventions implemented as appropriate)  Goal: Skin Health and Integrity  Outcome: Ongoing (interventions implemented as appropriate)     Problem: Social Isolation (Adult)  Goal: Identify Related Risk Factors and Signs and Symptoms  Outcome: Ongoing (interventions implemented as appropriate)  Goal: Socialization Enhancement  Outcome: Ongoing (interventions implemented as appropriate)     Problem: Nutrition, Imbalanced: Inadequate Oral Intake (Adult)  Goal: Improved Oral Intake  Outcome: Ongoing (interventions implemented as appropriate)  Goal: Prevent Further Weight Loss  Outcome: Ongoing (interventions implemented as appropriate)

## 2020-04-13 NOTE — PROGRESS NOTES
[facility]  INPATIENT PROGRESS NOTE    Patient Name: Gema Chapman  Date of Admission: 4/4/2020  Today's Date: 04/13/20  Length of Stay: 9  Primary Care Physician: Duy Hall MD    Subjective   Chief Complaint: follow-up COVID infection  HPI   Patient reports that she is doing pretty well.  She denies any new shortness of breath.  She does report coughing from time to time which is mostly nonproductive.  She denies any pain.  She feels like she continues to feel better on a daily basis.  Her appetite remains poor and she did not eat much of her breakfast.  She reports generalized weakness, and admits to not being out of bed very much.    Review of Systems     All pertinent negatives and positives are as above. All other systems have been reviewed and are negative unless otherwise stated.     Objective    Temp:  [96.5 °F (35.8 °C)-97.5 °F (36.4 °C)] 97 °F (36.1 °C)  Heart Rate:  [57-71] 57  Resp:  [20-24] 20  BP: (114-138)/(55-76) 126/62  Physical Exam   Constitutional:   Nontoxic appearing   HENT:   Head: Normocephalic.   Mouth/Throat: No oropharyngeal exudate.   Eyes: Pupils are equal, round, and reactive to light. No scleral icterus.   Neck: No tracheal deviation present.   Cardiovascular: Normal rate.   Pulmonary/Chest: Effort normal. No respiratory distress.   Moved from 4.5 to 3.5LNC while I was in room; 02 sats on continuous pulsox remained mid-90s   Neurological: She is alert.   Skin: Skin is warm.   Psychiatric: She has a normal mood and affect.   Vitals reviewed.    Results Review:  I have reviewed the labs, radiology results, and diagnostic studies.    Laboratory Data:   Results from last 7 days   Lab Units 04/11/20  0752 04/08/20  0802   WBC 10*3/mm3 12.91* 11.88*   HEMOGLOBIN g/dL 11.3* 10.6*   HEMATOCRIT % 34.1 31.5*   PLATELETS 10*3/mm3 424 413        Results from last 7 days   Lab Units 04/13/20  0832 04/11/20  0752 04/09/20  0805  04/08/20  0802   SODIUM mmol/L 142 141 140 136   POTASSIUM mmol/L 4.5 4.1 3.9 4.0   CHLORIDE mmol/L 103 104 105 102   CO2 mmol/L 28.0 26.0 22.0 20.0*   BUN mg/dL 44* 52* 68* 76*   CREATININE mg/dL 1.42* 1.47* 1.91* 2.33*   CALCIUM mg/dL 9.6 9.3 9.2 9.1   BILIRUBIN mg/dL 0.5 0.6  --  0.5   ALK PHOS U/L 137* 145*  --  139*   ALT (SGPT) U/L 186* 220*  --  261*   AST (SGOT) U/L 72* 83*  --  164*   GLUCOSE mg/dL 198* 257* 215* 233*       Culture Data:   No results found for: BLOODCX, URINECX, WOUNDCX, MRSACX, RESPCX, STOOLCX    Radiology Data:   Imaging Results (Last 24 Hours)     ** No results found for the last 24 hours. **          I have reviewed the patient's current medications.     Assessment/Plan     Active Hospital Problems    Diagnosis   • **COVID-19 virus detected   • Essential hypertension   • Vascular dementia without behavioral disturbance (CMS/HCC)   • Reactive depression   • Obesity (BMI 30-39.9)   • Elevated LFTs   • Elevated LDH   • Elevated ferritin   • CKD (chronic kidney disease) stage 4, GFR 15-29 ml/min (CMS/HCC)   • Febrile illness   • Type 2 diabetes mellitus (CMS/HCC)     Plan:  1.  Completed treatment with hydroxychloroquine  2.  I turned her supp 02 down to 3.5LNC while I was in room this AM; goal 02 sats greater than or equal to 90%  3.  Labs reviewed; stable  4.  ID following; appreciate their assistance  5.  PT/OT (telehealth) - increase activity as possible  6.  PO Zinc  7.  Encourage PO intake (looks like she ate a few bites of eggs, sausage, and an orange this AM).  8.  Titrate insulin Levemir to 12 units; monitor BS trend  9.  Dispo: two negative tests obtained 48hrs apart will be required before placement ([facility], [facility], possibly [facility]) can move forward.  Will discuss with Dr. Castaneda regarding timeline for retesting      Michael Huynh MD   04/13/20   11:25

## 2020-04-13 NOTE — PLAN OF CARE
Problem: Patient Care Overview  Goal: Plan of Care Review  Outcome: Ongoing (interventions implemented as appropriate)  Flowsheets (Taken 4/13/2020 0399)  Progress: improving  Outcome Summary: Pt slept well throughout the night. No c/o pain or discomfort. BG monitored. O2 stable at 6L hi flow NC. Pt says she feels better every day. VSS. Safety maintained. Will continue to monitor.

## 2020-04-14 LAB
GLUCOSE BLDC GLUCOMTR-MCNC: 174 MG/DL (ref 70–130)
GLUCOSE BLDC GLUCOMTR-MCNC: 198 MG/DL (ref 70–130)
GLUCOSE BLDC GLUCOMTR-MCNC: 275 MG/DL (ref 70–130)
GLUCOSE BLDC GLUCOMTR-MCNC: 354 MG/DL (ref 70–130)

## 2020-04-14 PROCEDURE — 82962 GLUCOSE BLOOD TEST: CPT

## 2020-04-14 PROCEDURE — 94799 UNLISTED PULMONARY SVC/PX: CPT

## 2020-04-14 PROCEDURE — 97535 SELF CARE MNGMENT TRAINING: CPT | Performed by: OCCUPATIONAL THERAPIST

## 2020-04-14 PROCEDURE — 63710000001 INSULIN DETEMIR PER 5 UNITS: Performed by: INTERNAL MEDICINE

## 2020-04-14 PROCEDURE — 25010000002 ENOXAPARIN PER 10 MG: Performed by: FAMILY MEDICINE

## 2020-04-14 PROCEDURE — 63710000001 INSULIN LISPRO (HUMAN) PER 5 UNITS: Performed by: FAMILY MEDICINE

## 2020-04-14 PROCEDURE — 97110 THERAPEUTIC EXERCISES: CPT

## 2020-04-14 RX ORDER — ROSUVASTATIN CALCIUM 20 MG/1
20 TABLET, COATED ORAL NIGHTLY
Status: DISCONTINUED | OUTPATIENT
Start: 2020-04-14 | End: 2020-04-21 | Stop reason: HOSPADM

## 2020-04-14 RX ADMIN — Medication 100 MG: at 09:59

## 2020-04-14 RX ADMIN — INSULIN LISPRO 4 UNITS: 100 INJECTION, SOLUTION INTRAVENOUS; SUBCUTANEOUS at 11:44

## 2020-04-14 RX ADMIN — INSULIN DETEMIR 15 UNITS: 100 INJECTION, SOLUTION SUBCUTANEOUS at 21:42

## 2020-04-14 RX ADMIN — SODIUM CHLORIDE, PRESERVATIVE FREE 10 ML: 5 INJECTION INTRAVENOUS at 20:53

## 2020-04-14 RX ADMIN — SODIUM CHLORIDE, PRESERVATIVE FREE 10 ML: 5 INJECTION INTRAVENOUS at 10:52

## 2020-04-14 RX ADMIN — FLUOXETINE 10 MG: 10 CAPSULE ORAL at 09:50

## 2020-04-14 RX ADMIN — MODAFINIL 100 MG: 100 TABLET ORAL at 09:50

## 2020-04-14 RX ADMIN — ROSUVASTATIN CALCIUM 20 MG: 20 TABLET, FILM COATED ORAL at 20:52

## 2020-04-14 RX ADMIN — INSULIN LISPRO 2 UNITS: 100 INJECTION, SOLUTION INTRAVENOUS; SUBCUTANEOUS at 17:20

## 2020-04-14 RX ADMIN — INSULIN LISPRO 2 UNITS: 100 INJECTION, SOLUTION INTRAVENOUS; SUBCUTANEOUS at 09:59

## 2020-04-14 RX ADMIN — INSULIN LISPRO 6 UNITS: 100 INJECTION, SOLUTION INTRAVENOUS; SUBCUTANEOUS at 20:54

## 2020-04-14 RX ADMIN — ENOXAPARIN SODIUM 30 MG: 30 INJECTION SUBCUTANEOUS at 17:14

## 2020-04-14 NOTE — THERAPY TREATMENT NOTE
Acute Care - Occupational Therapy Treatment Note  [facility]     Patient Name: Gema Chapman  : 1940  MRN: [id number]  Today's Date: 2020  Onset of Illness/Injury or Date of Surgery: 20  Date of Referral to OT: 04/10/20  Referring Physician: Dr. Kilgore    Admit Date: 2020       ICD-10-CM ICD-9-CM   1. Febrile illness R50.9 780.60   2. Pneumonia due to infectious organism, unspecified laterality, unspecified part of lung J18.9 136.9     484.8   3. Chronic kidney disease, unspecified CKD stage N18.9 585.9   4. Transaminitis R74.0 790.4   5. COVID-19 virus infection U07.1    6. Impaired mobility and ADLs Z74.09 799.89   7. Impaired mobility Z74.09 799.89     Patient Active Problem List   Diagnosis   • Febrile illness   • COVID-19 virus detected   • Type 2 diabetes mellitus (CMS/[facility])   • CKD (chronic kidney disease) stage 4, GFR 15-29 ml/min (CMS/HCC)   • Obesity (BMI 30-39.9)   • Elevated LFTs   • Elevated LDH   • Elevated ferritin   • Essential hypertension   • Vascular dementia without behavioral disturbance (CMS/HCC)   • Reactive depression     Past Medical History:   Diagnosis Date   • Diabetes (CMS/HCC)    • Hyperlipemia    • Hypertension    • Renal disease      Past Surgical History:   Procedure Laterality Date   • CHOLECYSTECTOMY     • HIP ARTHROPLASTY Left        Therapy Treatment    Rehabilitation Treatment Summary     Row Name 20 1240             Treatment Time/Intention    Discipline  occupational therapist  -MM      Document Type  therapy note (daily note) telehealth  -MM      Subjective Information  no complaints  -MM      Mode of Treatment  occupational therapy  -MM      Patient/Family Observations  RN present to assist  -MM      Existing Precautions/Restrictions  fall;oxygen therapy device and L/min;other (see comments) COVID+  -MM      Recorded by [MM] Leno Mejía, OTR/L 20 1629      Row Name 20 1240             Safety Issues, Functional Mobility     Impairments Affecting Function (Mobility)  endurance/activity tolerance;shortness of breath;balance;strength  -MM      Recorded by [MM] Leno Mejía, OTR/L 04/14/20 1629      Row Name 04/14/20 1240             Bed Mobility Assessment/Treatment    Comment (Bed Mobility)  up in chair  -MM      Recorded by [MM] Leno Mejía, OTR/L 04/14/20 1629      Row Name 04/14/20 1240             Lower Body Dressing Assessment/Training    Comment (Lower Body Dressing)  Pt heavily educated on increased ADL independence in participation. Encouraged pt to increase attempts with RN presence for self hygiene with toileting and bathing. Pt reports she has been self feeding and grooming with set up.  -MM      Recorded by [MM] Leno Mejía, OTR/L 04/14/20 1629      Row Name 04/14/20 1240             Motor Skills Assessment/Interventions    Additional Documentation  Therapeutic Exercise (Group);Therapeutic Exercise Interventions (Group)  -MM      Recorded by [MM] Leno Mejía, OTR/L 04/14/20 1629      Row Name 04/14/20 1240             Therapeutic Exercise    Upper Extremity Range of Motion (Therapeutic Exercise)  shoulder flexion/extension, bilateral;elbow flexion/extension, bilateral;forearm supination/pronation, bilateral;other (see comments) shoulder shrugs  -MM      Hand (Therapeutic Exercise)  hand , bilateral  -MM      Exercise Type (Therapeutic Exercise)  AROM (active range of motion)  -MM      Position (Therapeutic Exercise)  seated  -MM      Expected Outcome (Therapeutic Exercise)  facilitate normal movement patterns;improve performance, BADLs;improve functional tolerance, self-care activity  -MM      Comment (Therapeutic Exercise)  Pt recalled 1-2 of 3 previously taught exercise and reports some follow through. Encouraged pt to completed sets of 10-20 3x/day within pain free ROM. Additionally added shoulder shrugs and horizontal ab/adduction to HEP. Educated RN to assist in facilitating these tasks.  -MM       Recorded by [MM] Leno Mejía, OTR/L 04/14/20 1629      Row Name 04/14/20 1240             Positioning and Restraints    Pre-Treatment Position  sitting in chair/recliner  -MM      Post Treatment Position  chair  -MM      In Chair  sitting;call light within reach;encouraged to call for assist;with nsg  -MM      Recorded by [MM] Leno Mejía, OTR/L 04/14/20 1629      Row Name 04/14/20 1240             Pain Scale: Numbers Pre/Post-Treatment    Pain Scale: Numbers, Pretreatment  0/10 - no pain  -MM      Pre/Post Treatment Pain Comment  mild cough with activity  -MM      Recorded by [MM] Leno Mejía, OTR/L 04/14/20 1629      Row Name                Wound 04/05/20 0800 Bilateral coccyx MASD (Moisture associated skin damage)    Wound - Properties Group Date first assessed: 04/05/20 [MC] Time first assessed: 0800 [MC] Present on Hospital Admission: Y [MC] Side: Bilateral [MC] Location: coccyx [MC] Primary Wound Type: MASD [MC] Recorded by:  [MC] Lynette Luis RN 04/05/20 1348    Row Name 04/14/20 1240             Plan of Care Review    Plan of Care Reviewed With  patient  -MM      Progress  improving  -MM      Outcome Summary  OT tx completed via telehealth. RN present to assist and facilitate tx.  Pt reports she is feeling better, with less coughing and increased strength. Pt heavily educated on increased ADL independence in participation. Encouraged pt to increase attempts with RN presence for self hygiene with toileting and bathing. Pt reports she has been self feeding and grooming with set up. Pt recalled 1-2 of 3 previously taught exercise and reports some follow through. Encouraged pt to completed sets of 10-20 3x/day within pain free ROM. Additionally added shoulder shrugs and horizontal ab/adduction to HEP. Educated RN to assist in facilitating these tasks. Pt progressing well and in good spirits. Pt continues to benefit from skilled OT, will progress as pt is able to.  -MM      Recorded by  [MM] Leno Mejía, OTR/L 04/14/20 1629      Row Name 04/14/20 1240             Outcome Summary/Treatment Plan (OT)    Daily Summary of Progress (OT)  progress toward functional goals is good  -MM      Barriers to Overall Progress (OT)  decreased endurance  -MM      Plan for Continued Treatment (OT)  cont OT POC  -MM      Anticipated Discharge Disposition (OT)  inpatient rehabilitation facility;transitional care  -MM      Recorded by [MM] Leno Mejía, OTR/L 04/14/20 1629        User Key  (r) = Recorded By, (t) = Taken By, (c) = Cosigned By    Initials Name Effective Dates Discipline     Lynette Luis RN 08/02/16 -  Nurse    MM Leno Mejía, OTR/L 04/03/18 -  OT        Wound 04/05/20 0800 Bilateral coccyx MASD (Moisture associated skin damage) (Active)   Dressing Appearance open to air 4/14/2020  8:00 AM   Base maroon/purple 4/14/2020  8:00 AM   Periwound redness 4/14/2020  8:00 AM   Periwound Temperature warm 4/14/2020  8:00 AM   Drainage Amount none 4/14/2020  8:00 AM   Care, Wound cleansed with;soap and water 4/13/2020  9:06 PM   Dressing Care, Wound open to air 4/13/2020  9:06 PM     Rehab Goal Summary     Row Name 04/14/20 1230             Bed Mobility Goal 1 (PT)    Activity/Assistive Device (Bed Mobility Goal 1, PT)  sit to supine/supine to sit  -AGNIESZKA      [place] Level/Cues Needed (Bed Mobility Goal 1, PT)  independent  -AGNIESZKA      Time Frame (Bed Mobility Goal 1, PT)  long term goal (LTG);10 days  -AGNIESZKA      Progress/Outcomes (Bed Mobility Goal 1, PT)  good progress toward goal;goal ongoing  -AGNIESZKA         Transfer Goal 1 (PT)    Activity/Assistive Device (Transfer Goal 1, PT)  sit-to-stand/stand-to-sit;bed-to-chair/chair-to-bed;walker, rolling  -AGNIESZKA      [place] Level/Cues Needed (Transfer Goal 1, PT)  supervision required  -AGNIESZKA      Time Frame (Transfer Goal 1, PT)  long term goal (LTG);10 days  -AGNIESZKA      Progress/Outcome (Transfer Goal 1, PT)  good progress toward goal;goal ongoing   -AGNIESZKA         ROM Goal 1 (PT)    ROM Goal 1 (PT)  Independent B LE HEP x 20 reps  -AGNIESZKA      Time Frame (ROM Goal 1, PT)  long term goal (LTG)  -AGNIESZKA      Progress/Outcome (ROM Goal 1, PT)  good progress toward goal;goal ongoing  -AGNIESZKA        User Key  (r) = Recorded By, (t) = Taken By, (c) = Cosigned By    Initials Name Provider Type Discipline    Jeff Arvizu, PT DPT Physical Therapist PT            OT Recommendation and Plan  Outcome Summary/Treatment Plan (OT)  Daily Summary of Progress (OT): progress toward functional goals is good  Barriers to Overall Progress (OT): decreased endurance  Plan for Continued Treatment (OT): cont OT POC  Anticipated Discharge Disposition (OT): inpatient rehabilitation facility, transitional care  Planned Therapy Interventions (OT Eval): activity tolerance training, adaptive equipment training, BADL retraining, functional balance retraining, occupation/activity based interventions, passive ROM/stretching, patient/caregiver education/training, strengthening exercise, ROM/therapeutic exercise, transfer/mobility retraining, wheelchair assessment/training  Therapy Frequency (OT Eval): other (see comments)(2-3 days per week)  Daily Summary of Progress (OT): progress toward functional goals is good  Plan of Care Review  Plan of Care Reviewed With: patient  Plan of Care Reviewed With: patient  Outcome Summary: OT tx completed via telehealth. RN present to assist and facilitate tx.  Pt reports she is feeling better, with less coughing and increased strength. Pt heavily educated on increased ADL independence in participation. Encouraged pt to increase attempts with RN presence for self hygiene with toileting and bathing. Pt reports she has been self feeding and grooming with set up. Pt recalled 1-2 of 3 previously taught exercise and reports some follow through. Encouraged pt to completed sets of 10-20 3x/day within pain free ROM. Additionally added shoulder shrugs and horizontal  ab/adduction to HEP. Educated RN to assist in facilitating these tasks. Pt progressing well and in good spirits. Pt continues to benefit from skilled OT, will progress as pt is able to.  Outcome Measures     Row Name 04/14/20 1600             How much help from another is currently needed...    Putting on and taking off regular lower body clothing?  2  -MM      Bathing (including washing, rinsing, and drying)  2  -MM      Toileting (which includes using toilet bed pan or urinal)  2  -MM      Putting on and taking off regular upper body clothing  3  -MM      Taking care of personal grooming (such as brushing teeth)  3  -MM      Eating meals  3  -MM      AM-PAC 6 Clicks Score (OT)  15  -MM         Functional Assessment    Outcome Measure Options  AM-PAC 6 Clicks Daily Activity (OT)  -MM        User Key  (r) = Recorded By, (t) = Taken By, (c) = Cosigned By    Initials Name Provider Type    Leno Castro, OTR/L Occupational Therapist           Time Calculation:   Time Calculation- OT     Row Name 04/14/20 1225             Time Calculation- OT    OT Start Time  1225 co-tx with PT  -MM      OT Stop Time  1248  -MM      OT Time Calculation (min)  23 min  -MM      Total Timed Code Minutes- OT  8 minute(s)  -MM      OT Non-Billable Time (min)  15 min  -MM         Timed Charges    89969 - OT Self Care/Mgmt Minutes  8  -MM        User Key  (r) = Recorded By, (t) = Taken By, (c) = Cosigned By    Initials Name Provider Type    Leno Castro, OTR/L Occupational Therapist        Therapy Charges for Today     Code Description Service Date Service Provider Modifiers Qty    [id number] HC OT SELF CARE/MGMT/TRAIN EA 15 MIN 4/14/2020 Leno Mejía, OTR/L GO 1        This was an audio and video enabled telemedicine encounter.         YASIR Acharya/VI  4/14/2020

## 2020-04-14 NOTE — PLAN OF CARE
Problem: Patient Care Overview  Goal: Plan of Care Review  Outcome: Ongoing (interventions implemented as appropriate)  Flowsheets (Taken 4/14/2020 1240)  Progress: improving  Plan of Care Reviewed With: patient  Outcome Summary: OT tx completed via telehealth. RN present to assist and facilitate tx.  Pt reports she is feeling better, with less coughing and increased strength. Pt heavily educated on increased ADL independence in participation. Encouraged pt to increase attempts with RN presence for self hygiene with toileting and bathing. Pt reports she has been self feeding and grooming with set up. Pt recalled 1-2 of 3 previously taught exercise and reports some follow through. Encouraged pt to completed sets of 10-20 3x/day within pain free ROM. Additionally added shoulder shrugs and horizontal ab/adduction to HEP. Educated RN to assist in facilitating these tasks. Pt progressing well and in good spirits. Pt continues to benefit from skilled OT, will progress as pt is able to.

## 2020-04-14 NOTE — THERAPY TREATMENT NOTE
Patient Name: Gema Chapman  : 1940    MRN: [id number]                              Today's Date: 2020       Admit Date: 2020    Visit Dx:     ICD-10-CM ICD-9-CM   1. Febrile illness R50.9 780.60   2. Pneumonia due to infectious organism, unspecified laterality, unspecified part of lung J18.9 136.9     484.8   3. Chronic kidney disease, unspecified CKD stage N18.9 585.9   4. Transaminitis R74.0 790.4   5. COVID-19 virus infection U07.1    6. Impaired mobility and ADLs Z74.09 799.89   7. Impaired mobility Z74.09 799.89     Patient Active Problem List   Diagnosis   • Febrile illness   • COVID-19 virus detected   • Type 2 diabetes mellitus (CMS/HCC)   • CKD (chronic kidney disease) stage 4, GFR 15-29 ml/min (CMS/HCC)   • Obesity (BMI 30-39.9)   • Elevated LFTs   • Elevated LDH   • Elevated ferritin   • Essential hypertension   • Vascular dementia without behavioral disturbance (CMS/HCC)   • Reactive depression     Past Medical History:   Diagnosis Date   • Diabetes (CMS/HCC)    • Hyperlipemia    • Hypertension    • Renal disease      Past Surgical History:   Procedure Laterality Date   • CHOLECYSTECTOMY     • HIP ARTHROPLASTY Left      General Information     Row Name 20 1230          PT Evaluation Time/Intention    Document Type  therapy note (daily note);other (see comments) Telehealth treatment  -AGNIESZKA     Mode of Treatment  physical therapy  -AGNIESZKA     Row Name 20 1230          General Information    Patient Profile Reviewed?  yes  -AGNIESZKA     Existing Precautions/Restrictions  fall;oxygen therapy device and L/min COVID +  -AGNIESZKA     Barriers to Rehab  medically complex  -AGNIESZKA     Row Name 20 1230          Safety Issues, Functional Mobility    Impairments Affecting Function (Mobility)  endurance/activity tolerance;shortness of breath;balance;strength  -AGNIESZKA       User Key  (r) = Recorded By, (t) = Taken By, (c) = Cosigned By    Initials Name Provider Type    Jeff Arvizu, PT DPT Physical  Therapist        Mobility     Row Name 04/14/20 [facility]0          Bed Mobility Assessment/Treatment    Comment (Bed Mobility)  In chair, left as found  -AGNIESZKA     Row Name 04/14/20 [facility]          Transfer Assessment/Treatment    Comment (Transfers)  3 sit <> stands  -AGNIESZKA     Row Name 04/14/20 [facility]          Sit-Stand Transfer    Sit-Stand [place] (Transfers)  minimum assist (75% patient effort) RN assisted  -AGNIESZKA     Assistive Device (Sit-Stand Transfers)  walker, front-wheeled  -AGNIESZKA       User Key  (r) = Recorded By, (t) = Taken By, (c) = Cosigned By    Initials Name Provider Type    Jeff Arvizu PT DPT Physical Therapist        Obj/Interventions     Row Name 04/14/20 1230          Therapeutic Exercise    Lower Extremity Range of Motion (Therapeutic Exercise)  ankle dorsiflexion/plantar flexion, bilateral;knee flexion/extension, bilateral;hip flexion/extension, bilateral  -AGNIESZKA     Exercise Type (Therapeutic Exercise)  AROM (active range of motion)  -AGNIESZKA     Position (Therapeutic Exercise)  seated  -AGNIESZKA     Sets/Reps (Therapeutic Exercise)  ~ 10 reps  -AGNIESZKA     Expected Outcome (Therapeutic Exercise)  facilitate normal movement patterns  -AGNIESZKA       User Key  (r) = Recorded By, (t) = Taken By, (c) = Cosigned By    Initials Name Provider Type    Jeff Arvizu PT DPT Physical Therapist        Goals/Plan     Row Name 04/14/20 1230          Bed Mobility Goal 1 (PT)    Activity/Assistive Device (Bed Mobility Goal 1, PT)  sit to supine/supine to sit  -AGNIESZKA     [place] Level/Cues Needed (Bed Mobility Goal 1, PT)  independent  -AGNIESZKA     Time Frame (Bed Mobility Goal 1, PT)  long term goal (LTG);10 days  -AGNIESZKA     Progress/Outcomes (Bed Mobility Goal 1, PT)  good progress toward goal;goal ongoing  -AGNIESZKA     Row Name 04/14/20 1230          Transfer Goal 1 (PT)    Activity/Assistive Device (Transfer Goal 1, PT)  sit-to-stand/stand-to-sit;bed-to-chair/chair-to-bed;walker, rolling  -AGNIESZKA     [place] Level/Cues Needed (Transfer  Goal 1, PT)  supervision required  -AGNIESZKA     Time Frame (Transfer Goal 1, PT)  long term goal (LTG);10 days  -AGNIESZKA     Progress/Outcome (Transfer Goal 1, PT)  good progress toward goal;goal ongoing  -AGNIESZKA     [facility] Name 04/14/20 1230          ROM Goal 1 (PT)    ROM Goal 1 (PT)  Independent B LE HEP x 20 reps  -AGNIESZKA     Time Frame (ROM Goal 1, PT)  long term goal (LTG)  -AGNIESZKA     Progress/Outcome (ROM Goal 1, PT)  good progress toward goal;goal ongoing  -AGNIESZKA       User Key  (r) = Recorded By, (t) = Taken By, (c) = Cosigned By    Initials Name Provider Type    Jeff Arvizu, PT DPT Physical Therapist        Clinical Impression     [facility] Name 04/14/20 1230          Pain Assessment    Additional Documentation  Pain Scale: Numbers Pre/Post-Treatment (Group)  -AGNIESZKA     Row Name 04/14/20 1230          Pain Scale: Numbers Pre/Post-Treatment    Pain Scale: Numbers, Pretreatment  0/10 - no pain  -AGNIESZKA     Pre/Post Treatment Pain Comment  demos cough w activity  -AGNIESZKA     Row Name 04/14/20 1230          Plan of Care Review    Plan of Care Reviewed With  patient  -AGNIESZKA     Progress  improving  -AGNIESZKA     Row Name 04/14/20 1230          Vital Signs    Pre SpO2 (%)  98  -AGNIESZKA     O2 Delivery Pre Treatment  supplemental O2 3L/NC  -AGNIESZKA     Post SpO2 (%)  95  -AGNIESZKA     O2 Delivery Post Treatment  supplemental O2 3L/NC  -AGNIESZKA     Pre Patient Position  Sitting  -AGNIESZKA     Intra Patient Position  Standing  -AGNIESZKA     Post Patient Position  Sitting  -AGNIESZKA     Row Name 04/14/20 1230          Positioning and Restraints    Pre-Treatment Position  sitting in chair/recliner  -AGNIESZKA     Post Treatment Position  chair  -AGNIESZKA     In Chair  sitting;call light within reach;encouraged to call for assist;with nsg  -AGNIESZKA       User Key  (r) = Recorded By, (t) = Taken By, (c) = Cosigned By    Initials Name Provider Type    Jeff Arvizu PT DPT Physical Therapist        Outcome Measures     Row Name 04/14/20 1230          How much help from another person do you currently need...     Turning from your back to your side while in flat bed without using bedrails?  3  -AGNIESZKA     Moving from lying on back to sitting on the side of a flat bed without bedrails?  3  -AGNIESZKA     Moving to and from a bed to a chair (including a wheelchair)?  3  -AGNIESZKA     Standing up from a chair using your arms (e.g., wheelchair, bedside chair)?  3  -AGNIESZKA     Climbing 3-5 steps with a railing?  2  -AGNIESZKA     To walk in hospital room?  3  -AGNIESZKA     AM-PAC 6 Clicks Score (PT)  17  -AGNIESZKA     Row Name 04/14/20 1230          Functional Assessment    Outcome Measure Options  AM-PAC 6 Clicks Basic Mobility (PT)  -AGNIESZKA       User Key  (r) = Recorded By, (t) = Taken By, (c) = Cosigned By    Initials Name Provider Type    Jeff Arvizu, PT DPT Physical Therapist          PT Recommendation and Plan  Planned Therapy Interventions (PT Eval): bed mobility training, transfer training, gait training, balance training, home exercise program, patient/family education, postural re-education, strengthening  Outcome Summary/Treatment Plan (PT)  Anticipated Discharge Disposition (PT): inpatient rehabilitation facility, skilled nursing facility  Plan of Care Reviewed With: patient  Progress: improving  Outcome Summary: PT treatment completed via telehealth. The patient presents alert and sitting in the chair. She reports feeling better overall and stronger than last PT session. She was educated on continuing and patient demos B LE HEP. PT also added in marhcing in place while standing with RW and sit <.>stand from chair x 3 reps as part of her exercise plan. PT will continue to progress strength and functional mobility as able.     Time Calculation:   PT Charges     Row Name 04/14/20 1319             Time Calculation    Start Time  1230  -AGNIESZKA      Stop Time  1240  -AGNIESZKA      Time Calculation (min)  10 min  -AGNIESZKA      PT Received On  04/14/20  -AGNIESZKA      PT Goal Re-Cert Due Date  04/20/20  -AGNIESZKA         Time Calculation- PT    Total Timed Code Minutes- PT  10  minute(s)  -AGNIESZKA         Timed Charges    06648 - PT Therapeutic Exercise Minutes  10  -AGNIESZKA        User Key  (r) = Recorded By, (t) = Taken By, (c) = Cosigned By    Initials Name Provider Type    Jeff Arvizu, PT DPT Physical Therapist        Therapy Charges for Today     Code Description Service Date Service Provider Modifiers Qty    94925268553 HC PT THER PROC EA 15 MIN 4/14/2020 Jeff Rey, PT DPT GP 1          PT G-Codes  Outcome Measure Options: AM-PAC 6 Clicks Basic Mobility (PT)  AM-PAC 6 Clicks Score (PT): 17  AM-PAC 6 Clicks Score (OT): 15    Jeff Rey, PT DPT  4/14/2020

## 2020-04-14 NOTE — PLAN OF CARE
Problem: Patient Care Overview  Goal: Plan of Care Review  Flowsheets (Taken 4/14/2020 1230)  Outcome Summary: PT treatment completed via telehealth. The patient presents alert and sitting in the chair. She reports feeling better overall and stronger than last PT session. She was educated on continuing and patient demos B LE HEP. PT also added in marhcing in place while standing with RW and sit <.>stand from chair x 3 reps as part of her exercise plan. PT will continue to progress strength and functional mobility as able.

## 2020-04-14 NOTE — PLAN OF CARE
"  Problem: Patient Care Overview  Goal: Plan of Care Review  Outcome: Ongoing (interventions implemented as appropriate)  Flowsheets (Taken 4/14/2020 8949)  Progress: improving  Outcome Summary: VSS. Pt denies any pain or discomfort. O2 remains stable on 5L NC. Pt seems to have more of an appetite this shift as she finished a full fruit cup and jello cup, as well as her Boost. Increased intake of water also noted. When asking how she's feeling she says \"feeling better\". Resting well. Sinus herbert on tele, upper 40's- low 50's. Skin care provided. Encouraged pt to turn and assisted when agreeable. Safety maintained. Will continue to monitor.     "

## 2020-04-14 NOTE — PROGRESS NOTES
"Infectious Diseases Progress Note    Patient:  Gema Chapman  YOB: 1940  MRN: [id number]   Admit date: 4/4/2020   Admitting Physician: Michael Huynh MD  Primary Care Physician: Duy Hall MD    Chief Complaint/Interval History: She was seen today again via video visit.  She indicates she ate a full dinner.  Sounds as if her appetite is improving.  She feels stronger.  She is on 5 L of oxygen per nasal cannula this morning with saturation of 100%.  Nurses been able to wean her oxygen today.  Currently on room air at rest with an oxygen saturation of 96%.  In talking with the patient does not sound as if she became is dyspneic when she got up to the chair.  She seemed to do better today.  She feels her cough is improved.  She also feels her breathing has improved.    Intake/Output Summary (Last 24 hours) at 4/14/2020 1819  Last data filed at 4/14/2020 1255  Gross per 24 hour   Intake 200 ml   Output 525 ml   Net -325 ml     Allergies: No Known Allergies  Current Scheduled Medications:     enoxaparin 30 mg Subcutaneous Q24H   FLUoxetine 10 mg Oral Daily   insulin detemir 15 Units Subcutaneous Nightly   insulin lispro 2-7 Units Subcutaneous 4x Daily With Meals & Nightly   modafinil 100 mg Oral Daily   rosuvastatin 20 mg Oral Nightly   sodium chloride 10 mL Intravenous Q12H   zinc gluconate 100 mg Oral Daily     Current PRN Medications:  •  acetaminophen **OR** acetaminophen **OR** acetaminophen  •  dextrose  •  dextrose  •  glucagon (human recombinant)  •  ondansetron  •  sodium chloride    Review of Systems see HPI    Vital Signs:  /54 (BP Location: Left arm, Patient Position: Sitting)   Pulse 73   Temp 97.5 °F (36.4 °C) (Axillary)   Resp 23   Ht 167.6 cm (66\")   Wt 95 kg (209 lb 8 oz)   SpO2 96%   BMI 33.81 kg/m²     Physical Exam  Vital signs - reviewed.  Physical exam via video visit reveals her to be comfortable appearing.  She is not coughing.  She did not appear to " be dyspneic.  Lower extremities without significant edema.  Discussed exam with nurse.    Lab Results:  CBC: Results from last 7 days   Lab Units 04/11/20  0752 04/08/20  0802   WBC 10*3/mm3 12.91* 11.88*   HEMOGLOBIN g/dL 11.3* 10.6*   HEMATOCRIT % 34.1 31.5*   PLATELETS 10*3/mm3 424 413     BMP:  Results from last 7 days   Lab Units 04/13/20  0832 04/11/20  0752 04/09/20  0805 04/08/20  0802   SODIUM mmol/L 142 141 140 136   POTASSIUM mmol/L 4.5 4.1 3.9 4.0   CHLORIDE mmol/L 103 104 105 102   CO2 mmol/L 28.0 26.0 22.0 20.0*   BUN mg/dL 44* 52* 68* 76*   CREATININE mg/dL 1.42* 1.47* 1.91* 2.33*   GLUCOSE mg/dL 198* 257* 215* 233*   CALCIUM mg/dL 9.6 9.3 9.2 9.1   ALT (SGPT) U/L 186* 220*  --  261*     Culture Results: No results found for: BLOODCX, URINECX, WOUNDCX, MRSACX, RESPCX, STOOLCX  Radiology: None  Additional Studies Reviewed: None    Impression:   1.  COVID-19 pneumonia-seems to be improving.  Oxygen requirements diminishing.  Over past 48 hours seems to have made favorable progress.  Diabetes mellitus  3.  Hypertension  4.  Chronic renal failure-creatinine improved from admission    Recommendations:   Continue supportive care  Monitor oxygen levels with exertion tomorrow to see if she desaturates  PRN oxygen as needed to maintain saturation above 91%  Would suggest repeat COVID-19 PCR  We will probably use test based strategy to determine when to remove her from enhanced droplet precautions    Drew Castaneda MD

## 2020-04-14 NOTE — PLAN OF CARE
Patient up in chair most of the day, up X1 with a walker, patients appetite is much improved, room air, did not sat below 90% when up to the BSC, VSS, ill continue to monitor

## 2020-04-14 NOTE — PROGRESS NOTES
[facility] Medicine Services  INPATIENT PROGRESS NOTE    Patient Name: Gema Chapman  Date of Admission: 4/4/2020  Today's Date: 04/14/20  Length of Stay: 10  Primary Care Physician: Duy Hall MD    Subjective   Chief Complaint: follow-up COVID infection  HPI   Patient reports that she is doing well.  She is sitting up in the bedside chair and reports this transition out of bed was much easier today.  Feels like she is breathing better.  She has been moved from 5 L by nasal cannula down to 3 L by nasal cannula and currently is maintaining O2 saturations in the mid 90s on continuous pulse ox.  She denies any new pain.  She reports that her appetite is improving, and in fact ate the majority of her breakfast (pancakes).    Review of Systems   Constitutional: Positive for fever.        All pertinent negatives and positives are as above. All other systems have been reviewed and are negative unless otherwise stated.     Objective    Temp:  [96.3 °F (35.7 °C)-97.3 °F (36.3 °C)] 96.8 °F (36 °C)  Heart Rate:  [] 94  Resp:  [18-27] 18  BP: ()/(43-65) 127/52  Physical Exam   Constitutional:   Sitting up in bedside chair   HENT:   Head: Normocephalic.   Mouth/Throat: No oropharyngeal exudate.   Eyes: Pupils are equal, round, and reactive to light. No scleral icterus.   Neck: No tracheal deviation present.   Cardiovascular: Normal rate.   Pulmonary/Chest: Effort normal. No respiratory distress.   Currently on 3LNC   Neurological: She is alert.   Skin: Skin is warm.   Psychiatric: She has a normal mood and affect.   Vitals reviewed.    Results Review:  I have reviewed the labs, radiology results, and diagnostic studies.    Laboratory Data:   Results from last 7 days   Lab Units 04/11/20  0752 04/08/20  0802   WBC 10*3/mm3 12.91* 11.88*   HEMOGLOBIN g/dL 11.3* 10.6*   HEMATOCRIT % 34.1 31.5*   PLATELETS 10*3/mm3 424 413        Results from last 7 days   Lab Units  04/13/20  0832 04/11/20  0752 04/09/20  0805 04/08/20  0802   SODIUM mmol/L 142 141 140 136   POTASSIUM mmol/L 4.5 4.1 3.9 4.0   CHLORIDE mmol/L 103 104 105 102   CO2 mmol/L 28.0 26.0 22.0 20.0*   BUN mg/dL 44* 52* 68* 76*   CREATININE mg/dL 1.42* 1.47* 1.91* 2.33*   CALCIUM mg/dL 9.6 9.3 9.2 9.1   BILIRUBIN mg/dL 0.5 0.6  --  0.5   ALK PHOS U/L 137* 145*  --  139*   ALT (SGPT) U/L 186* 220*  --  261*   AST (SGOT) U/L 72* 83*  --  164*   GLUCOSE mg/dL 198* 257* 215* 233*       Culture Data:   No results found for: BLOODCX, URINECX, WOUNDCX, MRSACX, RESPCX, STOOLCX    Radiology Data:   Imaging Results (Last 24 Hours)     ** No results found for the last 24 hours. **          I have reviewed the patient's current medications.     Assessment/Plan     Active Hospital Problems    Diagnosis   • **COVID-19 virus detected   • Essential hypertension   • Vascular dementia without behavioral disturbance (CMS/[facility])   • Reactive depression   • Obesity (BMI 30-39.9)   • Elevated LFTs   • Elevated LDH   • Elevated ferritin   • CKD (chronic kidney disease) stage 4, GFR 15-29 ml/min (CMS/[facility])   • Febrile illness   • Type 2 diabetes mellitus (CMS/[facility])     Plan:  1.  Completed treatment with hydroxychloroquine  2.  02 down to 3LNC; 02 sats mid 90s on continuous pulsox 3.  ID following; appreciate their assistance  4.  PT/OT (telehealth) - increase activity as possible; currently sitting up in bedside chair  5.  Appetite and PO intake improving  6.  PO Zinc  7.  Continue telemetry monitoring - having episodes of bradycardia; patient remains asymptomatic at this time; continue to monitor  8.  Titrate insulin Levemir to 15 units; monitor BS trend  9.  Restart statin - outpatient medication  10.  Dispo: two negative tests obtained 24-48hrs apart will be required before placement ([facility], [facility], possibly [facility]).  Minimum of 72 hours afebrile without fever reducing medications.   11.  Will discuss with Dr. Castaneda regarding timeline  for retesting - retest in next day or two?      Michael Huynh MD   04/14/20   10:55

## 2020-04-14 NOTE — THERAPY TREATMENT NOTE
Patient Name: Gema Chapman  : 1940    MRN: [id number]                              Today's Date: 2020       Admit Date: 2020    Visit Dx:     ICD-10-CM ICD-9-CM   1. Febrile illness R50.9 780.60   2. Pneumonia due to infectious organism, unspecified laterality, unspecified part of lung J18.9 136.9     484.8   3. Chronic kidney disease, unspecified CKD stage N18.9 585.9   4. Transaminitis R74.0 790.4   5. COVID-19 virus infection U07.1    6. Impaired mobility and ADLs Z74.09 799.89   7. Impaired mobility Z74.09 799.89     Patient Active Problem List   Diagnosis   • Febrile illness   • COVID-19 virus detected   • Type 2 diabetes mellitus (CMS/HCC)   • CKD (chronic kidney disease) stage 4, GFR 15-29 ml/min (CMS/HCC)   • Obesity (BMI 30-39.9)   • Elevated LFTs   • Elevated LDH   • Elevated ferritin   • Essential hypertension   • Vascular dementia without behavioral disturbance (CMS/HCC)   • Reactive depression     Past Medical History:   Diagnosis Date   • Diabetes (CMS/HCC)    • Hyperlipemia    • Hypertension    • Renal disease      Past Surgical History:   Procedure Laterality Date   • CHOLECYSTECTOMY     • HIP ARTHROPLASTY Left      General Information     Row Name 20 1230          PT Evaluation Time/Intention    Document Type  therapy note (daily note);other (see comments) Telehealth treatment  -AGNIEZSKA     Mode of Treatment  physical therapy  -AGNIESZKA     Row Name 20 1230          General Information    Patient Profile Reviewed?  yes  -AGNIESZKA     Existing Precautions/Restrictions  fall;oxygen therapy device and L/min COVID +  -AGNIESZKA     Barriers to Rehab  medically complex  -AGNIESZKA     Row Name 20 1230          Safety Issues, Functional Mobility    Impairments Affecting Function (Mobility)  endurance/activity tolerance;shortness of breath;balance;strength  -AGNIESZKA       User Key  (r) = Recorded By, (t) = Taken By, (c) = Cosigned By    Initials Name Provider Type    Jeff Arvizu, PT DPT Physical  Therapist        Mobility     Row Name 04/14/20 [facility]0          Bed Mobility Assessment/Treatment    Comment (Bed Mobility)  In chair, left as found  -AGNIESZKA     Row Name 04/14/20 [facility]0          Transfer Assessment/Treatment    Comment (Transfers)  3 sit <> stands  -AGNIESZKA     Row Name 04/14/20 [facility]0          Sit-Stand Transfer    Sit-Stand [place] (Transfers)  minimum assist (75% patient effort) RN assisted  -AGNIESZKA     Assistive Device (Sit-Stand Transfers)  walker, front-wheeled  -AGNIESZKA       User Key  (r) = Recorded By, (t) = Taken By, (c) = Cosigned By    Initials Name Provider Type    Jeff Arvizu PT DPT Physical Therapist        Obj/Interventions     Row Name 04/14/20 1230          Therapeutic Exercise    Lower Extremity Range of Motion (Therapeutic Exercise)  ankle dorsiflexion/plantar flexion, bilateral;knee flexion/extension, bilateral;hip flexion/extension, bilateral  -AGNIESZKA     Exercise Type (Therapeutic Exercise)  AROM (active range of motion)  -AGNIESZKA     Position (Therapeutic Exercise)  seated  -AGNIESZKA     Sets/Reps (Therapeutic Exercise)  ~ 10 reps  -AGNIESZKA     Expected Outcome (Therapeutic Exercise)  facilitate normal movement patterns  -AGNIESZKA       User Key  (r) = Recorded By, (t) = Taken By, (c) = Cosigned By    Initials Name Provider Type    Jeff Arvizu PT DPT Physical Therapist        Goals/Plan     Row Name 04/14/20 1230          Bed Mobility Goal 1 (PT)    Activity/Assistive Device (Bed Mobility Goal 1, PT)  sit to supine/supine to sit  -AGNIESZKA     [place] Level/Cues Needed (Bed Mobility Goal 1, PT)  independent  -AGNIESZKA     Time Frame (Bed Mobility Goal 1, PT)  long term goal (LTG);10 days  -AGNIESZKA     Progress/Outcomes (Bed Mobility Goal 1, PT)  good progress toward goal;goal ongoing  -AGNIESZKA     Row Name 04/14/20 1230          Transfer Goal 1 (PT)    Activity/Assistive Device (Transfer Goal 1, PT)  sit-to-stand/stand-to-sit;bed-to-chair/chair-to-bed;walker, rolling  -AGNIESZKA     [place] Level/Cues Needed (Transfer  Goal 1, PT)  supervision required  -AGNIESZKA     Time Frame (Transfer Goal 1, PT)  long term goal (LTG);10 days  -AGNIESZKA     Progress/Outcome (Transfer Goal 1, PT)  good progress toward goal;goal ongoing  -AGNIESZKA     [facility] Name 04/14/20 1230          ROM Goal 1 (PT)    ROM Goal 1 (PT)  Independent B LE HEP x 20 reps  -AGNIESZKA     Time Frame (ROM Goal 1, PT)  long term goal (LTG)  -AGNIESZKA     Progress/Outcome (ROM Goal 1, PT)  good progress toward goal;goal ongoing  -AGNIESZKA       User Key  (r) = Recorded By, (t) = Taken By, (c) = Cosigned By    Initials Name Provider Type    Jeff Arvizu, PT DPT Physical Therapist        Clinical Impression     [facility] Name 04/14/20 1230          Pain Assessment    Additional Documentation  Pain Scale: Numbers Pre/Post-Treatment (Group)  -AGNIESZKA     Row Name 04/14/20 1230          Pain Scale: Numbers Pre/Post-Treatment    Pain Scale: Numbers, Pretreatment  0/10 - no pain  -AGNIESZKA     Pre/Post Treatment Pain Comment  demos cough w activity  -AGNIESZKA     Row Name 04/14/20 1230          Plan of Care Review    Plan of Care Reviewed With  patient  -AGNIESZKA     Progress  improving  -AGNIESZKA     Row Name 04/14/20 1230          Vital Signs    Pre SpO2 (%)  98  -AGNIESZKA     O2 Delivery Pre Treatment  supplemental O2 3L/NC  -AGNIESZKA     Post SpO2 (%)  95  -AGNIESZKA     O2 Delivery Post Treatment  supplemental O2 3L/NC  -AGNIESZKA     Pre Patient Position  Sitting  -AGNIESZKA     Intra Patient Position  Standing  -AGNIESZKA     Post Patient Position  Sitting  -AGNIESZKA     Row Name 04/14/20 1230          Positioning and Restraints    Pre-Treatment Position  sitting in chair/recliner  -AGNIESZKA     Post Treatment Position  chair  -AGNIESZKA     In Chair  sitting;call light within reach;encouraged to call for assist;with nsg  -AGNIESZKA       User Key  (r) = Recorded By, (t) = Taken By, (c) = Cosigned By    Initials Name Provider Type    Jeff Arvizu PT DPT Physical Therapist        Outcome Measures     Row Name 04/14/20 1230          How much help from another person do you currently need...     Turning from your back to your side while in flat bed without using bedrails?  3  -AGNIESZKA     Moving from lying on back to sitting on the side of a flat bed without bedrails?  3  -AGNIESZKA     Moving to and from a bed to a chair (including a wheelchair)?  3  -AGNIESZKA     Standing up from a chair using your arms (e.g., wheelchair, bedside chair)?  3  -AGNIESZKA     Climbing 3-5 steps with a railing?  2  -AGNIESZKA     To walk in hospital room?  3  -AGNIESZKA     AM-PAC 6 Clicks Score (PT)  17  -AGNIESZKA     Row Name 04/14/20 1230          Functional Assessment    Outcome Measure Options  AM-PAC 6 Clicks Basic Mobility (PT)  -AGNIESZKA       User Key  (r) = Recorded By, (t) = Taken By, (c) = Cosigned By    Initials Name Provider Type    Jeff Arvizu, PT DPT Physical Therapist          PT Recommendation and Plan  Planned Therapy Interventions (PT Eval): bed mobility training, transfer training, gait training, balance training, home exercise program, patient/family education, postural re-education, strengthening  Outcome Summary/Treatment Plan (PT)  Anticipated Discharge Disposition (PT): inpatient rehabilitation facility, skilled nursing facility  Plan of Care Reviewed With: patient  Progress: improving  Outcome Summary: PT treatment completed via telehealth. The patient presents alert and sitting in the chair. She reports feeling better overall and stronger than last PT session. She was educated on continuing and patient demos B LE HEP. PT also added in marhcing in place while standing with RW and sit <.>stand from chair x 3 reps as part of her exercise plan. PT will continue to progress strength and functional mobility as able.     Time Calculation:   PT Charges     Row Name 04/14/20 1319             Time Calculation    Start Time  1230  -AGNIESZKA      Stop Time  1240  -AGNIESZKA      Time Calculation (min)  10 min  -AGNIESZKA      PT Received On  04/14/20  -AGNIESZKA      PT Goal Re-Cert Due Date  04/20/20  -AGNIESZKA         Time Calculation- PT    Total Timed Code Minutes- PT  10  minute(s)  -AGNIESZKA         Timed Charges    00907 - PT Therapeutic Exercise Minutes  10  -AGNIESZKA        User Key  (r) = Recorded By, (t) = Taken By, (c) = Cosigned By    Initials Name Provider Type    Jeff Arvizu, PT DPT Physical Therapist        Therapy Charges for Today     Code Description Service Date Service Provider Modifiers Qty    [id number] HC PT THER PROC EA 15 MIN 4/14/2020 Jeff Rey, PT DPT GP 1        This was an audio and video enabled telemedicine encounter.      PT G-Codes  Outcome Measure Options: AM-PAC 6 Clicks Basic Mobility (PT)  AM-PAC 6 Clicks Score (PT): 17  AM-PAC 6 Clicks Score (OT): 15    Jeff Rey, VAMSI DPT  4/14/2020

## 2020-04-14 NOTE — PLAN OF CARE
Problem: Patient Care Overview  Goal: Plan of Care Review  Outcome: Ongoing (interventions implemented as appropriate)  Flowsheets (Taken 4/14/2020 1151)  Progress: improving  Plan of Care Reviewed With: patient  Outcome Summary: Pt's appetite is improving. She is on a cardiac, C.CHO diet. She has consumed 42% of the last 3 meals and actually consumed 75% of breakfast this am. She does try to drink some of her suppelments. Recommend to change Boost Plus to Boost Glucose Control r/t improving appetite, pt has dx of DM, and elevated BS. No new weight since admission to compare current weight. All staff to encourage po intake. She is aware of available alternate selections as needed. Will cont to follow for nutrition needs.

## 2020-04-15 LAB
GLUCOSE BLDC GLUCOMTR-MCNC: 222 MG/DL (ref 70–130)
GLUCOSE BLDC GLUCOMTR-MCNC: 224 MG/DL (ref 70–130)
GLUCOSE BLDC GLUCOMTR-MCNC: 307 MG/DL (ref 70–130)
GLUCOSE BLDC GLUCOMTR-MCNC: 315 MG/DL (ref 70–130)

## 2020-04-15 PROCEDURE — 94799 UNLISTED PULMONARY SVC/PX: CPT

## 2020-04-15 PROCEDURE — 25010000002 ENOXAPARIN PER 10 MG: Performed by: FAMILY MEDICINE

## 2020-04-15 PROCEDURE — 87635 SARS-COV-2 COVID-19 AMP PRB: CPT | Performed by: INTERNAL MEDICINE

## 2020-04-15 PROCEDURE — 63710000001 INSULIN DETEMIR PER 5 UNITS: Performed by: INTERNAL MEDICINE

## 2020-04-15 PROCEDURE — 63710000001 INSULIN LISPRO (HUMAN) PER 5 UNITS: Performed by: FAMILY MEDICINE

## 2020-04-15 PROCEDURE — 82962 GLUCOSE BLOOD TEST: CPT

## 2020-04-15 RX ADMIN — ENOXAPARIN SODIUM 30 MG: 30 INJECTION SUBCUTANEOUS at 17:05

## 2020-04-15 RX ADMIN — SODIUM CHLORIDE, PRESERVATIVE FREE 10 ML: 5 INJECTION INTRAVENOUS at 20:25

## 2020-04-15 RX ADMIN — INSULIN LISPRO 3 UNITS: 100 INJECTION, SOLUTION INTRAVENOUS; SUBCUTANEOUS at 17:14

## 2020-04-15 RX ADMIN — ROSUVASTATIN CALCIUM 20 MG: 20 TABLET, FILM COATED ORAL at 20:25

## 2020-04-15 RX ADMIN — MODAFINIL 100 MG: 100 TABLET ORAL at 08:16

## 2020-04-15 RX ADMIN — INSULIN LISPRO 5 UNITS: 100 INJECTION, SOLUTION INTRAVENOUS; SUBCUTANEOUS at 12:03

## 2020-04-15 RX ADMIN — Medication 100 MG: at 08:16

## 2020-04-15 RX ADMIN — SODIUM CHLORIDE, PRESERVATIVE FREE 10 ML: 5 INJECTION INTRAVENOUS at 08:17

## 2020-04-15 RX ADMIN — INSULIN LISPRO 5 UNITS: 100 INJECTION, SOLUTION INTRAVENOUS; SUBCUTANEOUS at 20:26

## 2020-04-15 RX ADMIN — INSULIN LISPRO 3 UNITS: 100 INJECTION, SOLUTION INTRAVENOUS; SUBCUTANEOUS at 08:21

## 2020-04-15 RX ADMIN — FLUOXETINE 10 MG: 10 CAPSULE ORAL at 08:16

## 2020-04-15 RX ADMIN — INSULIN DETEMIR 25 UNITS: 100 INJECTION, SOLUTION SUBCUTANEOUS at 21:17

## 2020-04-15 NOTE — PLAN OF CARE
Problem: Patient Care Overview  Goal: Plan of Care Review  Outcome: Ongoing (interventions implemented as appropriate)  Flowsheets (Taken 4/15/2020 6390)  Progress: improving  Plan of Care Reviewed With: patient  Outcome Summary: VSS. O2 sat stable on room air. Afebrile this shift. Rested quietly. No c/o voiced. No s/s distress/discomfort noted. Pt. states her appetite is improving. Safety maintained.  Enhanced droplet/contact precautions ongoing. Call light within reach. Will continue to monitor.

## 2020-04-15 NOTE — PROGRESS NOTES
[facility] Medicine Services  INPATIENT PROGRESS NOTE    Patient Name: Gema Chapman  Date of Admission: 4/4/2020  Today's Date: 04/15/20  Length of Stay: 11  Primary Care Physician: Duy Hall MD    Subjective   Chief Complaint: follow-up COVID infection  Fever         Patient doing great.  Sitting up in the bedside chair.  Feels like that her breathing is better.  She has been weaned off of supplemental oxygen.  She denies any pain.  She reports that her appetite is improving.  She reports that her strength is slowly improving as well.    Review of Systems   Constitutional: Positive for fever.        All pertinent negatives and positives are as above. All other systems have been reviewed and are negative unless otherwise stated.     Objective    Temp:  [96.8 °F (36 °C)-98.1 °F (36.7 °C)] 97.2 °F (36.2 °C)  Heart Rate:  [42-94] 58  Resp:  [20-24] 24  BP: ()/(46-69) 120/69  Physical Exam   Constitutional:   Sitting up in bedside chair   HENT:   Head: Normocephalic.   Mouth/Throat: No oropharyngeal exudate.   Eyes: Pupils are equal, round, and reactive to light. No scleral icterus.   Neck: No tracheal deviation present.   Cardiovascular: Normal rate.   Pulmonary/Chest: Effort normal. No respiratory distress.   Now on room air!   Neurological: She is alert.   Skin: Skin is warm.   Psychiatric: She has a normal mood and affect.   Vitals reviewed.    Results Review:  I have reviewed the labs, radiology results, and diagnostic studies.    Laboratory Data:   Results from last 7 days   Lab Units 04/11/20  0752   WBC 10*3/mm3 12.91*   HEMOGLOBIN g/dL 11.3*   HEMATOCRIT % 34.1   PLATELETS 10*3/mm3 424        Results from last 7 days   Lab Units 04/13/20  0832 04/11/20  0752 04/09/20  0805   SODIUM mmol/L 142 141 140   POTASSIUM mmol/L 4.5 4.1 3.9   CHLORIDE mmol/L 103 104 105   CO2 mmol/L 28.0 26.0 22.0   BUN mg/dL 44* 52* 68*   CREATININE mg/dL 1.42* 1.47* 1.91*      CALCIUM mg/dL 9.6 9.3 9.2   BILIRUBIN mg/dL 0.5 0.6  --    ALK PHOS U/L 137* 145*  --    ALT (SGPT) U/L 186* 220*  --    AST (SGOT) U/L 72* 83*  --    GLUCOSE mg/dL 198* 257* 215*       Culture Data:   No results found for: BLOODCX, URINECX, WOUNDCX, MRSACX, RESPCX, STOOLCX    Radiology Data:   Imaging Results (Last 24 Hours)     ** No results found for the last 24 hours. **          I have reviewed the patient's current medications.     Assessment/Plan     Active Hospital Problems    Diagnosis   • **COVID-19 virus detected   • Essential hypertension   • Vascular dementia without behavioral disturbance (CMS/HCC)   • Reactive depression   • Obesity (BMI 30-39.9)   • Elevated LFTs   • Elevated LDH   • Elevated ferritin   • CKD (chronic kidney disease) stage 4, GFR 15-29 ml/min (CMS/HCC)   • Febrile illness   • Type 2 diabetes mellitus (CMS/HCC)     Plan:  1.  Completed treatment with hydroxychloroquine  2.  Now on room air  3.  Repeat COVID-19 testing today  4.  PT/OT (telehealth) - increase activity as possible; currently sitting up in bedside chair  5.  PO Zinc  6.  Continue telemetry monitoring - having episodes of bradycardia; patient remains asymptomatic at this time; continue to monitor  7.  Blood sugars rising; appetite improving.  Titrate insulin Levemir to 25 units twice daily; outpt med rec list 61 units Lantus twice daily, so anticipate insulin regimen can likely be titrated further in the very near future pending BS trend  8.  Dispo: two negative tests obtained 24-48hrs apart will be required before placement ([facility], [facility], possibly [facility]).  Minimum of 72 hours afebrile without fever reducing medications.       Michael Huynh MD   04/15/20   13:32

## 2020-04-16 LAB
GLUCOSE BLDC GLUCOMTR-MCNC: 172 MG/DL (ref 70–130)
GLUCOSE BLDC GLUCOMTR-MCNC: 179 MG/DL (ref 70–130)
GLUCOSE BLDC GLUCOMTR-MCNC: 237 MG/DL (ref 70–130)
GLUCOSE BLDC GLUCOMTR-MCNC: 240 MG/DL (ref 70–130)
SARS-COV-2 RNA RESP QL NAA+PROBE: DETECTED

## 2020-04-16 PROCEDURE — 82962 GLUCOSE BLOOD TEST: CPT

## 2020-04-16 PROCEDURE — 25010000002 ENOXAPARIN PER 10 MG: Performed by: FAMILY MEDICINE

## 2020-04-16 PROCEDURE — 63710000001 INSULIN DETEMIR PER 5 UNITS: Performed by: INTERNAL MEDICINE

## 2020-04-16 PROCEDURE — 63710000001 INSULIN LISPRO (HUMAN) PER 5 UNITS: Performed by: FAMILY MEDICINE

## 2020-04-16 RX ADMIN — SODIUM CHLORIDE, PRESERVATIVE FREE 10 ML: 5 INJECTION INTRAVENOUS at 20:47

## 2020-04-16 RX ADMIN — ROSUVASTATIN CALCIUM 20 MG: 20 TABLET, FILM COATED ORAL at 20:46

## 2020-04-16 RX ADMIN — FLUOXETINE 10 MG: 10 CAPSULE ORAL at 08:17

## 2020-04-16 RX ADMIN — INSULIN LISPRO 3 UNITS: 100 INJECTION, SOLUTION INTRAVENOUS; SUBCUTANEOUS at 12:06

## 2020-04-16 RX ADMIN — INSULIN LISPRO 2 UNITS: 100 INJECTION, SOLUTION INTRAVENOUS; SUBCUTANEOUS at 08:31

## 2020-04-16 RX ADMIN — MODAFINIL 100 MG: 100 TABLET ORAL at 08:17

## 2020-04-16 RX ADMIN — ENOXAPARIN SODIUM 30 MG: 30 INJECTION SUBCUTANEOUS at 16:39

## 2020-04-16 RX ADMIN — Medication 100 MG: at 08:17

## 2020-04-16 RX ADMIN — INSULIN LISPRO 2 UNITS: 100 INJECTION, SOLUTION INTRAVENOUS; SUBCUTANEOUS at 16:38

## 2020-04-16 RX ADMIN — INSULIN DETEMIR 35 UNITS: 100 INJECTION, SOLUTION SUBCUTANEOUS at 20:58

## 2020-04-16 RX ADMIN — INSULIN DETEMIR 25 UNITS: 100 INJECTION, SOLUTION SUBCUTANEOUS at 08:32

## 2020-04-16 RX ADMIN — SODIUM CHLORIDE, PRESERVATIVE FREE 10 ML: 5 INJECTION INTRAVENOUS at 08:18

## 2020-04-16 RX ADMIN — INSULIN LISPRO 3 UNITS: 100 INJECTION, SOLUTION INTRAVENOUS; SUBCUTANEOUS at 20:46

## 2020-04-16 NOTE — PROGRESS NOTES
[facility] Medicine Services  INPATIENT PROGRESS NOTE    Patient Name: Gema Chapman  Date of Admission: 4/4/2020  Today's Date: 04/16/20  Length of Stay: 12  Primary Care Physician: Duy Hall MD    Subjective   Chief Complaint: follow-up COVID infection  Fever         Patient doing great.  She denies any shortness of breath symptoms.  No new cough or congestion.  No palpitations.  No presyncope or syncope.  She is currently sitting up in the bedside chair.  She voices no new complaints.    Review of Systems   Constitutional: Positive for fever.        All pertinent negatives and positives are as above. All other systems have been reviewed and are negative unless otherwise stated.     Objective    Temp:  [97.2 °F (36.2 °C)-97.9 °F (36.6 °C)] 97.9 °F (36.6 °C)  Heart Rate:  [38-70] 68  Resp:  [17-26] 20  BP: ()/(47-84) 94/55  Physical Exam   Constitutional:   Sitting up in bedside chair   HENT:   Head: Normocephalic.   Mouth/Throat: No oropharyngeal exudate.   Eyes: Pupils are equal, round, and reactive to light. No scleral icterus.   Neck: No tracheal deviation present.   Cardiovascular: Normal rate.   Pulmonary/Chest: Effort normal. No respiratory distress.   Remains on room air   Neurological: She is alert.   Skin: Skin is warm.   Psychiatric: She has a normal mood and affect.   Vitals reviewed.    Results Review:  I have reviewed the labs, radiology results, and diagnostic studies.    Laboratory Data:   Results from last 7 days   Lab Units 04/11/20  0752   WBC 10*3/mm3 12.91*   HEMOGLOBIN g/dL 11.3*   HEMATOCRIT % 34.1   PLATELETS 10*3/mm3 424        Results from last 7 days   Lab Units 04/13/20  0832 04/11/20  0752   SODIUM mmol/L 142 141   POTASSIUM mmol/L 4.5 4.1   CHLORIDE mmol/L 103 104   CO2 mmol/L 28.0 26.0   BUN mg/dL 44* 52*   CREATININE mg/dL 1.42* 1.47*   CALCIUM mg/dL 9.6 9.3   BILIRUBIN mg/dL 0.5 0.6   ALK PHOS U/L 137* 145*   ALT (SGPT) U/L  186* 220*   AST (SGOT) U/L 72* 83*   GLUCOSE mg/dL 198* 257*       Culture Data:   No results found for: BLOODCX, URINECX, WOUNDCX, MRSACX, RESPCX, STOOLCX    Radiology Data:   Imaging Results (Last 24 Hours)     ** No results found for the last 24 hours. **          I have reviewed the patient's current medications.     Assessment/Plan     Active Hospital Problems    Diagnosis   • **COVID-19 virus detected   • Essential hypertension   • Vascular dementia without behavioral disturbance (CMS/HCC)   • Reactive depression   • Obesity (BMI 30-39.9)   • Elevated LFTs   • Elevated LDH   • Elevated ferritin   • CKD (chronic kidney disease) stage 4, GFR 15-29 ml/min (CMS/HCC)   • Febrile illness   • Type 2 diabetes mellitus (CMS/HCC)     Plan:  1.  Completed treatment with hydroxychloroquine  2.  Now on room air  3.  Repeat COVID-19 testing has returned positive  4.  PT/OT (telehealth) - increase activity as possible; OOB as much as possible  5.  Continue telemetry monitoring - having episodes of bradycardia; patient remains asymptomatic at this time; continue to monitor  6.  Blood sugars remain high.  Titrate insulin Levemir to 35 units twice daily; outpt med rec list 61 units Lantus twice daily, so anticipate insulin regimen can likely be titrated further in the very near future pending BS trend  7.  Dispo: two negative tests obtained 24-48hrs apart will be required before placement ([facility], [facility], possibly [facility]).  Patient remains afebrile.  8.  Continue supportive care      Michael Huynh MD   04/16/20   11:29

## 2020-04-16 NOTE — PLAN OF CARE
Problem: Patient Care Overview  Goal: Plan of Care Review  Outcome: Ongoing (interventions implemented as appropriate)  Flowsheets  Taken 4/16/2020 1808 by Raine Shipley RN  Progress: improving  Taken 4/16/2020 0823 by Airam Frank RN  Plan of Care Reviewed With: patient  Note:   VSS, maintaining oxygen saturation on room air, pt in no distress, up in chair most of this shift, to BSC with assist x1, states she feels better today, safety maintained, spoke with daughter with updates, no further questions or complaints at this time, will continue to monitor.   Goal: Individualization and Mutuality  Outcome: Ongoing (interventions implemented as appropriate)  Goal: Discharge Needs Assessment  Outcome: Ongoing (interventions implemented as appropriate)  Goal: Interprofessional Rounds/Family Conf  Outcome: Ongoing (interventions implemented as appropriate)     Problem: Infection, Risk/Actual (Adult)  Goal: Identify Related Risk Factors and Signs and Symptoms  Outcome: Ongoing (interventions implemented as appropriate)  Goal: Infection Prevention/Resolution  Outcome: Ongoing (interventions implemented as appropriate)     Problem: Diabetes, Type 2 (Adult)  Goal: Signs and Symptoms of Listed Potential Problems Will be Absent, Minimized or Managed (Diabetes, Type 2)  Outcome: Ongoing (interventions implemented as appropriate)     Problem: Breathing Pattern Ineffective (Adult)  Goal: Effective Oxygenation/Ventilation  Outcome: Ongoing (interventions implemented as appropriate)  Goal: Anxiety/Fear Reduction  Outcome: Ongoing (interventions implemented as appropriate)     Problem: Fall Risk (Adult)  Goal: Absence of Fall  Outcome: Ongoing (interventions implemented as appropriate)     Problem: Skin Injury Risk (Adult)  Goal: Skin Health and Integrity  Outcome: Ongoing (interventions implemented as appropriate)     Problem: Social Isolation (Adult)  Goal: Socialization Enhancement  Outcome: Ongoing (interventions  implemented as appropriate)     Problem: Nutrition, Imbalanced: Inadequate Oral Intake (Adult)  Goal: Improved Oral Intake  Outcome: Ongoing (interventions implemented as appropriate)  Goal: Prevent Further Weight Loss  Outcome: Ongoing (interventions implemented as appropriate)

## 2020-04-16 NOTE — PROGRESS NOTES
"Infectious Diseases Progress Note    Patient:  Gema Chapman  YOB: 1940  MRN: [id number]   Admit date: 4/4/2020   Admitting Physician: Michael Huynh MD  Primary Care Physician: Duy Hall MD    Chief Complaint/Interval History: She seems to be doing very well.  She is smiling and interactive.  Had video visit with her today.  She has no cough or shortness of breath.  She reports good appetite and oral intake.  Oxygen saturation 99% on room air.  She feels she is getting stronger.    Intake/Output Summary (Last 24 hours) at 4/16/2020 0838  Last data filed at 4/15/2020 1800  Gross per 24 hour   Intake 480 ml   Output --   Net 480 ml     Allergies: No Known Allergies  Current Scheduled Medications:     enoxaparin 30 mg Subcutaneous Q24H   FLUoxetine 10 mg Oral Daily   insulin detemir 25 Units Subcutaneous Q12H   insulin lispro 2-7 Units Subcutaneous 4x Daily With Meals & Nightly   modafinil 100 mg Oral Daily   rosuvastatin 20 mg Oral Nightly   sodium chloride 10 mL Intravenous Q12H   zinc gluconate 100 mg Oral Daily     Current PRN Medications:  •  acetaminophen **OR** acetaminophen **OR** acetaminophen  •  dextrose  •  dextrose  •  glucagon (human recombinant)  •  ondansetron  •  sodium chloride    Review of Systems see HPI    Vital Signs:  BP 94/55 (BP Location: Left arm, Patient Position: Sitting)   Pulse 68   Temp 97.9 °F (36.6 °C) (Oral)   Resp 20   Ht 167.6 cm (66\")   Wt 85.4 kg (188 lb 4.4 oz)   SpO2 96%   BMI 30.39 kg/m²     Physical Exam  Vital signs - reviewed.  General she is smiling and interactive.  She does not appear short of breath.  She did not cough at all during video visit.  She clearly looks to be improving and gaining strength daily.    Lab Results:  CBC: Results from last 7 days   Lab Units 04/11/20  0752   WBC 10*3/mm3 12.91*   HEMOGLOBIN g/dL 11.3*   HEMATOCRIT % 34.1   PLATELETS 10*3/mm3 424     BMP:  Results from last 7 days   Lab Units " 04/13/20  0832 04/11/20  0752   SODIUM mmol/L 142 141   POTASSIUM mmol/L 4.5 4.1   CHLORIDE mmol/L 103 104   CO2 mmol/L 28.0 26.0   BUN mg/dL 44* 52*   CREATININE mg/dL 1.42* 1.47*   GLUCOSE mg/dL 198* 257*   CALCIUM mg/dL 9.6 9.3   ALT (SGPT) U/L 186* 220*     COVID-19 screening April 15, 2020-positive    Culture Results: No results found for: BLOODCX, URINECX, WOUNDCX, MRSACX, RESPCX, STOOLCX  Radiology: None  Additional Studies Reviewed: None    Impression:   COVID-19 pneumonia-improvement.  Resolution of pulmonary symptoms.  Resolution of fever.  Resolution of need for oxygen supplementation.    Recommendations:   We will repeat nasopharyngeal swab today to help evaluate for utilization of testing base strategy to discontinue enhanced droplet precautions  In addition to testing for COVID-19 today, will repeat testing on Sunday morning.  Will see again Monday  Please call in interim if any additional questions for infectious diseases or if any deterioration in clinical status    Addendum-in addition to NP swab for COVID-19 it was ordered this morning, I placed orders for additional COVID-19 testing NP swab on Sunday, April 19, 2020 8:00    Drew Castaneda MD

## 2020-04-16 NOTE — PROGRESS NOTES
Continued Stay Note  [facility]     Patient Name: Gema Chapman  MRN: [id number]  Today's Date: 4/16/2020    Admit Date: 4/4/2020    Discharge Plan     Row Name 04/16/20 1040       Plan    Plan  SNF    Patient/Family in Agreement with Plan  yes    Plan Comments   Left message for Mary Lou at [facility] [phone], to see if they are able to take Covid-19 at this point. As previously stated, [facility] and [facility] will require two consecutive negative COVID 19 tests (24 - 48 hours apart confirming time frame requirements as CDC guideline is reportedly 48 hours apart).  And a minimum of 72 hours afebrile (without fever reducing meds).  Bed offers made are tentative until all medical clearances met due to bed availability as COVID positive patients coming from the hospital require a private room.  Physician has been informed of SNF requirements.        Discharge Codes    No documentation.             GABY Lozano

## 2020-04-16 NOTE — PLAN OF CARE
Problem: Patient Care Overview  Goal: Plan of Care Review  Outcome: Ongoing (interventions implemented as appropriate)  Flowsheets (Taken 4/16/2020 0614)  Progress: improving  Plan of Care Reviewed With: patient  Outcome Summary: VSS. O2 sat stable on room air. No c/o voiced this shift. No s/s distress or discomfort noted. Pt. states she is feeling better and is already up in the chair this a.m. Follow up covid-19 testing results pending (1st of 2 swabs). Safety maintained. Will continue to monitor.

## 2020-04-17 LAB
GLUCOSE BLDC GLUCOMTR-MCNC: 132 MG/DL (ref 70–130)
GLUCOSE BLDC GLUCOMTR-MCNC: 186 MG/DL (ref 70–130)
GLUCOSE BLDC GLUCOMTR-MCNC: 224 MG/DL (ref 70–130)
GLUCOSE BLDC GLUCOMTR-MCNC: 268 MG/DL (ref 70–130)

## 2020-04-17 PROCEDURE — 63710000001 INSULIN DETEMIR PER 5 UNITS: Performed by: INTERNAL MEDICINE

## 2020-04-17 PROCEDURE — 82962 GLUCOSE BLOOD TEST: CPT

## 2020-04-17 PROCEDURE — 25010000002 ENOXAPARIN PER 10 MG: Performed by: FAMILY MEDICINE

## 2020-04-17 PROCEDURE — 87635 SARS-COV-2 COVID-19 AMP PRB: CPT | Performed by: INTERNAL MEDICINE

## 2020-04-17 PROCEDURE — 63710000001 INSULIN LISPRO (HUMAN) PER 5 UNITS: Performed by: FAMILY MEDICINE

## 2020-04-17 RX ADMIN — FLUOXETINE 10 MG: 10 CAPSULE ORAL at 08:01

## 2020-04-17 RX ADMIN — SODIUM CHLORIDE, PRESERVATIVE FREE 10 ML: 5 INJECTION INTRAVENOUS at 20:49

## 2020-04-17 RX ADMIN — ENOXAPARIN SODIUM 30 MG: 30 INJECTION SUBCUTANEOUS at 17:30

## 2020-04-17 RX ADMIN — Medication 100 MG: at 08:00

## 2020-04-17 RX ADMIN — MODAFINIL 100 MG: 100 TABLET ORAL at 08:01

## 2020-04-17 RX ADMIN — INSULIN LISPRO 3 UNITS: 100 INJECTION, SOLUTION INTRAVENOUS; SUBCUTANEOUS at 11:24

## 2020-04-17 RX ADMIN — ROSUVASTATIN CALCIUM 20 MG: 20 TABLET, FILM COATED ORAL at 20:49

## 2020-04-17 RX ADMIN — INSULIN LISPRO 2 UNITS: 100 INJECTION, SOLUTION INTRAVENOUS; SUBCUTANEOUS at 17:35

## 2020-04-17 RX ADMIN — INSULIN DETEMIR 40 UNITS: 100 INJECTION, SOLUTION SUBCUTANEOUS at 20:50

## 2020-04-17 RX ADMIN — INSULIN LISPRO 4 UNITS: 100 INJECTION, SOLUTION INTRAVENOUS; SUBCUTANEOUS at 20:50

## 2020-04-17 RX ADMIN — SODIUM CHLORIDE, PRESERVATIVE FREE 10 ML: 5 INJECTION INTRAVENOUS at 08:01

## 2020-04-17 RX ADMIN — INSULIN DETEMIR 35 UNITS: 100 INJECTION, SOLUTION SUBCUTANEOUS at 09:00

## 2020-04-17 NOTE — PLAN OF CARE
Problem: Patient Care Overview  Goal: Plan of Care Review  Outcome: Ongoing (interventions implemented as appropriate)  Flowsheets (Taken 4/17/2020 0436)  Progress: improving  Plan of Care Reviewed With: patient  Outcome Summary: Pt has had no c/o pain. Afebrile. VSS. Room air. No SOA when ambulating to bsc. Pt in chair throughout the night. Isolation precautions maintained. Continue to monitor.

## 2020-04-17 NOTE — PLAN OF CARE
Problem: Patient Care Overview  Goal: Plan of Care Review  Outcome: Ongoing (interventions implemented as appropriate)  Flowsheets (Taken 4/17/2020 1224)  Progress: improving  Plan of Care Reviewed With: patient  Outcome Summary: Pt reports to this RD via telephone that her appetite is really good and the meals have been good. She reports she is feeling much better. She says that she is receiving the Boost glucose control and she likes them but she has accumulated some in her room. Will only send once per day at this time instead of BID. She has consumed 75% of the last 5 meals. Advised pt of alternate meal selections as needed. Will cont to follow for nutrition needs.

## 2020-04-17 NOTE — PROGRESS NOTES
Continued Stay Note  [facility]     Patient Name: Gema Chapman  MRN: [id number]  Today's Date: 4/17/2020    Admit Date: 4/4/2020    Discharge Plan     Row Name 04/17/20 0917       Plan    Plan  SNF    Plan Comments   Left message for Mary Lou at [facility] [phone], to see if they are able to take Covid-19 at this point. As previously stated, [facility] and [facility] will require two consecutive negative COVID 19 tests (24 - 48 hours apart confirming time frame requirements as CDC guideline is reportedly 48 hours apart).  And a minimum of 72 hours afebrile (without fever reducing meds).  Bed offers made are tentative until all medical clearances met due to bed availability as COVID positive patients coming from the hospital require a private room.  Physician has been informed of SNF requirements.        Discharge Codes    No documentation.             JOELLEN Parrish

## 2020-04-18 LAB
GLUCOSE BLDC GLUCOMTR-MCNC: 119 MG/DL (ref 70–130)
GLUCOSE BLDC GLUCOMTR-MCNC: 192 MG/DL (ref 70–130)
GLUCOSE BLDC GLUCOMTR-MCNC: 209 MG/DL (ref 70–130)
GLUCOSE BLDC GLUCOMTR-MCNC: 211 MG/DL (ref 70–130)
SARS-COV-2 RNA RESP QL NAA+PROBE: DETECTED

## 2020-04-18 PROCEDURE — 82962 GLUCOSE BLOOD TEST: CPT

## 2020-04-18 PROCEDURE — 63710000001 INSULIN DETEMIR PER 5 UNITS: Performed by: INTERNAL MEDICINE

## 2020-04-18 PROCEDURE — 25010000002 ENOXAPARIN PER 10 MG: Performed by: FAMILY MEDICINE

## 2020-04-18 PROCEDURE — 63710000001 INSULIN LISPRO (HUMAN) PER 5 UNITS: Performed by: FAMILY MEDICINE

## 2020-04-18 RX ADMIN — FLUOXETINE 10 MG: 10 CAPSULE ORAL at 08:52

## 2020-04-18 RX ADMIN — INSULIN DETEMIR 40 UNITS: 100 INJECTION, SOLUTION SUBCUTANEOUS at 08:52

## 2020-04-18 RX ADMIN — ROSUVASTATIN CALCIUM 20 MG: 20 TABLET, FILM COATED ORAL at 20:03

## 2020-04-18 RX ADMIN — INSULIN DETEMIR 40 UNITS: 100 INJECTION, SOLUTION SUBCUTANEOUS at 20:03

## 2020-04-18 RX ADMIN — INSULIN LISPRO 2 UNITS: 100 INJECTION, SOLUTION INTRAVENOUS; SUBCUTANEOUS at 11:44

## 2020-04-18 RX ADMIN — INSULIN LISPRO 3 UNITS: 100 INJECTION, SOLUTION INTRAVENOUS; SUBCUTANEOUS at 16:24

## 2020-04-18 RX ADMIN — MODAFINIL 100 MG: 100 TABLET ORAL at 08:52

## 2020-04-18 RX ADMIN — Medication 100 MG: at 08:52

## 2020-04-18 RX ADMIN — SODIUM CHLORIDE, PRESERVATIVE FREE 10 ML: 5 INJECTION INTRAVENOUS at 08:52

## 2020-04-18 RX ADMIN — SODIUM CHLORIDE, PRESERVATIVE FREE 10 ML: 5 INJECTION INTRAVENOUS at 20:03

## 2020-04-18 RX ADMIN — INSULIN LISPRO 3 UNITS: 100 INJECTION, SOLUTION INTRAVENOUS; SUBCUTANEOUS at 20:06

## 2020-04-18 RX ADMIN — ENOXAPARIN SODIUM 30 MG: 30 INJECTION SUBCUTANEOUS at 16:23

## 2020-04-18 NOTE — PLAN OF CARE
Problem: Patient Care Overview  Goal: Plan of Care Review  Outcome: Ongoing (interventions implemented as appropriate)  Flowsheets (Taken 4/18/2020 0355)  Progress: improving  Plan of Care Reviewed With: patient  Outcome Summary: Pt has had no c/o pain. VSS. Afebrile. Room air. Ambulated to the bathroom with walker and stand by assist with no difficulty, no soa. Isolation precautions maintained. Awaiting results of repeat covid test obtained 04/17/2020. Continue to monitor.

## 2020-04-18 NOTE — PROGRESS NOTES
[facility] Medicine Services  INPATIENT PROGRESS NOTE    Patient Name: Gema Chapman  Date of Admission: 4/4/2020  Today's Date: 04/18/20  Length of Stay: 14  Primary Care Physician: Duy Hall MD    Subjective   Chief Complaint: shortness of breath  HPI     Patient seen and evaluated on the iPad.  Patient is doing fantastic.  Compared to when I saw her Sunday, she is now on room air and has minimal cough.  Appetite has improved.  Patient sitting up in the chair and smiling.          Review of Systems   Constitutional: Negative for activity change, appetite change, chills, fatigue and fever.   Respiratory: Negative for cough and shortness of breath.    Cardiovascular: Negative for chest pain and palpitations.   Gastrointestinal: Negative for abdominal distention, abdominal pain, constipation, diarrhea, nausea and vomiting.         All pertinent negatives and positives are as above. All other systems have been reviewed and are negative unless otherwise stated.     Objective    Temp:  [96.5 °F (35.8 °C)-98.1 °F (36.7 °C)] 98.1 °F (36.7 °C)  Heart Rate:  [44-76] 64  Resp:  [15-23] 19  BP: ()/(46-57) 120/53  Physical Exam   Constitutional: She is oriented to person, place, and time. No distress.   HENT:   Head: Normocephalic and atraumatic.   Eyes: Conjunctivae are normal. No scleral icterus.   Neck: Neck supple. No JVD present.   Cardiovascular: Normal rate and regular rhythm.   Telemetry reviewed    Pulmonary/Chest: Effort normal. No respiratory distress.   Neurological: She is alert and oriented to person, place, and time.   Skin: She is not diaphoretic. No erythema. No pallor.   Psychiatric: She has a normal mood and affect. Her behavior is normal.   Nursing note and vitals reviewed.      This exam was performed remotely in the unit aided by real-time audio/visual communication tools and with assistance from nursing staff.      Results Review:  I have reviewed  the labs, radiology results, and diagnostic studies.    Laboratory Data:          Results from last 7 days   Lab Units 04/13/20  0832   SODIUM mmol/L 142   POTASSIUM mmol/L 4.5   CHLORIDE mmol/L 103   CO2 mmol/L 28.0   BUN mg/dL 44*   CREATININE mg/dL 1.42*   CALCIUM mg/dL 9.6   BILIRUBIN mg/dL 0.5   ALK PHOS U/L 137*   ALT (SGPT) U/L 186*   AST (SGOT) U/L 72*   GLUCOSE mg/dL 198*       Culture Data:   No results found for: BLOODCX, URINECX, WOUNDCX, MRSACX, RESPCX, STOOLCX    Radiology Data:   Imaging Results (Last 24 Hours)     ** No results found for the last 24 hours. **          I have reviewed the patient's current medications.     Assessment/Plan     Active Hospital Problems    Diagnosis   • **COVID-19 virus detected   • Essential hypertension   • Vascular dementia without behavioral disturbance (CMS/HCC)   • Reactive depression   • Obesity (BMI 30-39.9)   • Elevated LFTs   • Elevated LDH   • Elevated ferritin   • CKD (chronic kidney disease) stage 4, GFR 15-29 ml/min (CMS/HCC)   • Febrile illness   • Type 2 diabetes mellitus (CMS/HCC)       Plan:  1.  Completed course of hydroxychloroquine (400 mg BID x 1 day, 200 mg BID x 4 days)  2.  Continues to be on room air, getting up and going to restroom on minimal assistance   3.  Repeat COVID-19 testing was positive on 4/15 and 4/17  4.  Appreciate infectious disease input  5.  PT/OT (telehealth) - increase activity as possible; OOB as much as possible  6.  Continue telemetry monitoring - having episodes of bradycardia - Asymptomatic  7.  Continue insulin Levemir to 40 units twice daily; outpt med rec list 61 units Lantus twice daily - Sliding scale insulin with qac/qhs accuchecks   8.  Continue supportive care  9.  Retest Monday AM                 Discharge Planning: I expect the patient to be discharged to SNF in ? days.    [facility] and [facility] will require two consecutive negative COVID 19 tests 24-48 hours apart.  And a minimum of 72 hours  afebrile (without fever reducing meds).  Bed offers made are tentative until all medical clearances met due to bed availability as COVID positive patients coming from the hospital require a private room.    Jesse Kilgore MD   04/18/20   14:45

## 2020-04-18 NOTE — PLAN OF CARE
Problem: Patient Care Overview  Goal: Plan of Care Review  Outcome: Ongoing (interventions implemented as appropriate)  Flowsheets (Taken 4/18/2020 0297)  Progress: improving  Plan of Care Reviewed With: patient  Outcome Summary: Patient ambulated to bathroom 3-4 times today with standby assist and walker.  Up in chair all day. Appetite very good. Denies pain.  Room Air. Swab came back positive again today.  Dr. Kilgore wants patient reswabbed for Covid-19 on Monday AM  Goal: Individualization and Mutuality  Outcome: Ongoing (interventions implemented as appropriate)  Goal: Discharge Needs Assessment  Outcome: Ongoing (interventions implemented as appropriate)  Goal: Interprofessional Rounds/Family Conf  Outcome: Ongoing (interventions implemented as appropriate)     Problem: Infection, Risk/Actual (Adult)  Goal: Identify Related Risk Factors and Signs and Symptoms  Outcome: Ongoing (interventions implemented as appropriate)  Goal: Infection Prevention/Resolution  Outcome: Ongoing (interventions implemented as appropriate)     Problem: Diabetes, Type 2 (Adult)  Goal: Signs and Symptoms of Listed Potential Problems Will be Absent, Minimized or Managed (Diabetes, Type 2)  Outcome: Ongoing (interventions implemented as appropriate)     Problem: Breathing Pattern Ineffective (Adult)  Goal: Effective Oxygenation/Ventilation  Outcome: Ongoing (interventions implemented as appropriate)  Goal: Anxiety/Fear Reduction  Outcome: Ongoing (interventions implemented as appropriate)     Problem: Fall Risk (Adult)  Goal: Absence of Fall  Outcome: Ongoing (interventions implemented as appropriate)     Problem: Skin Injury Risk (Adult)  Goal: Skin Health and Integrity  Outcome: Ongoing (interventions implemented as appropriate)     Problem: Social Isolation (Adult)  Goal: Socialization Enhancement  Outcome: Ongoing (interventions implemented as appropriate)     Problem: Nutrition, Imbalanced: Inadequate Oral Intake (Adult)  Goal:  Improved Oral Intake  Outcome: Ongoing (interventions implemented as appropriate)  Goal: Prevent Further Weight Loss  Outcome: Ongoing (interventions implemented as appropriate)

## 2020-04-19 LAB
GLUCOSE BLDC GLUCOMTR-MCNC: 130 MG/DL (ref 70–130)
GLUCOSE BLDC GLUCOMTR-MCNC: 158 MG/DL (ref 70–130)
GLUCOSE BLDC GLUCOMTR-MCNC: 199 MG/DL (ref 70–130)
GLUCOSE BLDC GLUCOMTR-MCNC: 228 MG/DL (ref 70–130)

## 2020-04-19 PROCEDURE — 63710000001 INSULIN DETEMIR PER 5 UNITS: Performed by: INTERNAL MEDICINE

## 2020-04-19 PROCEDURE — 25010000002 ENOXAPARIN PER 10 MG: Performed by: FAMILY MEDICINE

## 2020-04-19 PROCEDURE — 82962 GLUCOSE BLOOD TEST: CPT

## 2020-04-19 PROCEDURE — 63710000001 INSULIN LISPRO (HUMAN) PER 5 UNITS: Performed by: FAMILY MEDICINE

## 2020-04-19 RX ADMIN — ENOXAPARIN SODIUM 30 MG: 30 INJECTION SUBCUTANEOUS at 17:12

## 2020-04-19 RX ADMIN — INSULIN LISPRO 2 UNITS: 100 INJECTION, SOLUTION INTRAVENOUS; SUBCUTANEOUS at 20:49

## 2020-04-19 RX ADMIN — FLUOXETINE 10 MG: 10 CAPSULE ORAL at 07:54

## 2020-04-19 RX ADMIN — INSULIN DETEMIR 40 UNITS: 100 INJECTION, SOLUTION SUBCUTANEOUS at 20:46

## 2020-04-19 RX ADMIN — INSULIN LISPRO 2 UNITS: 100 INJECTION, SOLUTION INTRAVENOUS; SUBCUTANEOUS at 12:31

## 2020-04-19 RX ADMIN — SODIUM CHLORIDE, PRESERVATIVE FREE 10 ML: 5 INJECTION INTRAVENOUS at 20:46

## 2020-04-19 RX ADMIN — ROSUVASTATIN CALCIUM 20 MG: 20 TABLET, FILM COATED ORAL at 20:46

## 2020-04-19 RX ADMIN — INSULIN LISPRO 3 UNITS: 100 INJECTION, SOLUTION INTRAVENOUS; SUBCUTANEOUS at 17:18

## 2020-04-19 RX ADMIN — SODIUM CHLORIDE, PRESERVATIVE FREE 10 ML: 5 INJECTION INTRAVENOUS at 07:56

## 2020-04-19 RX ADMIN — INSULIN DETEMIR 40 UNITS: 100 INJECTION, SOLUTION SUBCUTANEOUS at 07:51

## 2020-04-19 RX ADMIN — Medication 100 MG: at 07:54

## 2020-04-19 NOTE — PLAN OF CARE
Problem: Patient Care Overview  Goal: Plan of Care Review  Outcome: Ongoing (interventions implemented as appropriate)  Flowsheets  Taken 4/19/2020 0444  Progress: improving  Taken 4/18/2020 2009  Plan of Care Reviewed With: patient  Note:   No c/o pain. VSS. Up in chair all night. Room air. Jame wants pt reswabbed for COVID on Monday. A-fib on tele.

## 2020-04-19 NOTE — PROGRESS NOTES
[facility] Medicine Services  INPATIENT PROGRESS NOTE    Patient Name: Gema Chapman  Date of Admission: 4/4/2020  Today's Date: 04/19/20  Length of Stay: 15  Primary Care Physician: Duy Hall MD    Subjective   Chief Complaint: shortness of breath  HPI     Patient seen and evaluated on the iPad.  Patient is doing fantastic this morning.  She is sitting up in the chair and denies any complaints.  The patient had a T-max of 98.6.  She is on room air.  She indicates that she is able to go to and from the bathroom without any significant shortness of breath at this time.  She does have some hypoxia with walking, but she is asymptomatic with this.  Patient feels as though she continues to get stronger.        Review of Systems   Constitutional: Negative for activity change, appetite change, chills, diaphoresis, fatigue and fever.   Respiratory: Negative for cough and shortness of breath.    Cardiovascular: Negative for chest pain and palpitations.   Gastrointestinal: Negative for abdominal distention, constipation, diarrhea, nausea and vomiting.         All pertinent negatives and positives are as above. All other systems have been reviewed and are negative unless otherwise stated.     Objective    Temp:  [97.1 °F (36.2 °C)-98.6 °F (37 °C)] 97.5 °F (36.4 °C)  Heart Rate:  [46-69] 69  Resp:  [16-22] 19  BP: ()/(47-69) 111/50  Physical Exam   Constitutional: She is oriented to person, place, and time. No distress.   HENT:   Head: Normocephalic and atraumatic.   Eyes: Conjunctivae are normal. No scleral icterus.   Neck: Neck supple. No JVD present.   Cardiovascular: Normal rate and regular rhythm.   Telemetry reviewed    Pulmonary/Chest: Effort normal. No respiratory distress.   Neurological: She is alert and oriented to person, place, and time.   Skin: She is not diaphoretic. No erythema. No pallor.   Psychiatric: She has a normal mood and affect. Her behavior is  normal.   Nursing note and vitals reviewed.      This exam was performed remotely in the unit aided by real-time audio/visual communication tools and with assistance from nursing staff.      Results Review:  I have reviewed the labs, radiology results, and diagnostic studies.    Laboratory Data:          Results from last 7 days   Lab Units 04/13/20  0832   SODIUM mmol/L 142   POTASSIUM mmol/L 4.5   CHLORIDE mmol/L 103   CO2 mmol/L 28.0   BUN mg/dL 44*   CREATININE mg/dL 1.42*   CALCIUM mg/dL 9.6   BILIRUBIN mg/dL 0.5   ALK PHOS U/L 137*   ALT (SGPT) U/L 186*   AST (SGOT) U/L 72*   GLUCOSE mg/dL 198*       Culture Data:   No results found for: BLOODCX, URINECX, WOUNDCX, MRSACX, RESPCX, STOOLCX    Radiology Data:   Imaging Results (Last 24 Hours)     ** No results found for the last 24 hours. **          I have reviewed the patient's current medications.     Assessment/Plan     Active Hospital Problems    Diagnosis   • **COVID-19 virus detected   • Essential hypertension   • Vascular dementia without behavioral disturbance (CMS/HCC)   • Reactive depression   • Obesity (BMI 30-39.9)   • Elevated LFTs   • Elevated LDH   • Elevated ferritin   • CKD (chronic kidney disease) stage 4, GFR 15-29 ml/min (CMS/HCC)   • Febrile illness   • Type 2 diabetes mellitus (CMS/HCC)       Plan:  1.  Completed course of hydroxychloroquine (400 mg BID x 1 day, 200 mg BID x 4 days)  2.  Continues to be on room air, getting up and going to restroom on minimal assistance   3.  Repeat COVID-19 testing was positive on 4/15 and 4/17  4.  Appreciate infectious disease input  5.  PT/OT (telehealth) - increase activity as possible; up in chair   6.  Continue telemetry monitoring - having episodes of bradycardia - Asymptomatic  7.  Continue insulin Levemir to 40 units twice daily; outpt med rec list 61 units Lantus twice daily - Sliding scale insulin with qac/qhs accuchecks   8.  Continue supportive care  9.  COVID labs and retest for COVID-19 in  AM   10.  OKAY to d/c telemetry and pulse oximeter.  Will do spot checks of O2 with vital signs.                  Discharge Planning: I expect the patient to be discharged to SNF in ? days.    [facility] and likely  will require two consecutive negative COVID 19 tests 24-48 hours apart.  And a minimum of 72 hours afebrile (without fever reducing meds).  Bed offers made are tentative until all medical clearances met due to bed availability as COVID positive patients coming from the hospital require a private room.    Jesse Kilgore MD   04/19/20   13:58

## 2020-04-19 NOTE — PLAN OF CARE
Problem: Patient Care Overview  Goal: Plan of Care Review  Outcome: Ongoing (interventions implemented as appropriate)  Flowsheets (Taken 4/19/2020 1636)  Progress: no change  Plan of Care Reviewed With: patient  Outcome Summary: No significant change today.  Dr. Kilgore stated it was okay to remove continuous oxygen monitor and telemetry. Patient ambulated to bathroom with stand by assist.  Appetite very good. Covid reswab in AM and Dr. Kilgore order multiple labs for in AM.  Will continue to monitor

## 2020-04-20 LAB
ALBUMIN SERPL-MCNC: 3.1 G/DL (ref 3.5–5.2)
ALBUMIN/GLOB SERPL: 0.9 G/DL
ALP SERPL-CCNC: 103 U/L (ref 39–117)
ALT SERPL W P-5'-P-CCNC: 67 U/L (ref 1–33)
ANION GAP SERPL CALCULATED.3IONS-SCNC: 14 MMOL/L (ref 5–15)
AST SERPL-CCNC: 28 U/L (ref 1–32)
BASOPHILS # BLD AUTO: 0.08 10*3/MM3 (ref 0–0.2)
BASOPHILS NFR BLD AUTO: 0.8 % (ref 0–1.5)
BILIRUB SERPL-MCNC: 0.4 MG/DL (ref 0.2–1.2)
BUN BLD-MCNC: 43 MG/DL (ref 8–23)
BUN/CREAT SERPL: 26.5 (ref 7–25)
CALCIUM SPEC-SCNC: 9.4 MG/DL (ref 8.6–10.5)
CHLORIDE SERPL-SCNC: 106 MMOL/L (ref 98–107)
CO2 SERPL-SCNC: 24 MMOL/L (ref 22–29)
CREAT BLD-MCNC: 1.62 MG/DL (ref 0.57–1)
CRP SERPL-MCNC: 0.15 MG/DL (ref 0–0.5)
DEPRECATED RDW RBC AUTO: 50.1 FL (ref 37–54)
EOSINOPHIL # BLD AUTO: 0.22 10*3/MM3 (ref 0–0.4)
EOSINOPHIL NFR BLD AUTO: 2.2 % (ref 0.3–6.2)
ERYTHROCYTE [DISTWIDTH] IN BLOOD BY AUTOMATED COUNT: 14.3 % (ref 12.3–15.4)
FERRITIN SERPL-MCNC: 575 NG/ML (ref 13–150)
GFR SERPL CREATININE-BSD FRML MDRD: 30 ML/MIN/1.73
GLOBULIN UR ELPH-MCNC: 3.5 GM/DL
GLUCOSE BLD-MCNC: 83 MG/DL (ref 65–99)
GLUCOSE BLDC GLUCOMTR-MCNC: 118 MG/DL (ref 70–130)
GLUCOSE BLDC GLUCOMTR-MCNC: 139 MG/DL (ref 70–130)
GLUCOSE BLDC GLUCOMTR-MCNC: 170 MG/DL (ref 70–130)
GLUCOSE BLDC GLUCOMTR-MCNC: 81 MG/DL (ref 70–130)
HCT VFR BLD AUTO: 33.8 % (ref 34–46.6)
HGB BLD-MCNC: 10.7 G/DL (ref 12–15.9)
IMM GRANULOCYTES # BLD AUTO: 0.03 10*3/MM3 (ref 0–0.05)
IMM GRANULOCYTES NFR BLD AUTO: 0.3 % (ref 0–0.5)
INR PPP: 1.06 (ref 0.91–1.09)
LDH SERPL-CCNC: 204 U/L (ref 135–214)
LYMPHOCYTES # BLD AUTO: 3.56 10*3/MM3 (ref 0.7–3.1)
LYMPHOCYTES NFR BLD AUTO: 36.1 % (ref 19.6–45.3)
MCH RBC QN AUTO: 30.7 PG (ref 26.6–33)
MCHC RBC AUTO-ENTMCNC: 31.7 G/DL (ref 31.5–35.7)
MCV RBC AUTO: 97.1 FL (ref 79–97)
MONOCYTES # BLD AUTO: 0.68 10*3/MM3 (ref 0.1–0.9)
MONOCYTES NFR BLD AUTO: 6.9 % (ref 5–12)
NEUTROPHILS # BLD AUTO: 5.29 10*3/MM3 (ref 1.7–7)
NEUTROPHILS NFR BLD AUTO: 53.7 % (ref 42.7–76)
NRBC BLD AUTO-RTO: 0 /100 WBC (ref 0–0.2)
PLATELET # BLD AUTO: 341 10*3/MM3 (ref 140–450)
PMV BLD AUTO: 10.4 FL (ref 6–12)
POTASSIUM BLD-SCNC: 4.1 MMOL/L (ref 3.5–5.2)
PROCALCITONIN SERPL-MCNC: 0.06 NG/ML (ref 0.1–0.25)
PROT SERPL-MCNC: 6.6 G/DL (ref 6–8.5)
PROTHROMBIN TIME: 13.7 SECONDS (ref 11.9–14.6)
RBC # BLD AUTO: 3.48 10*6/MM3 (ref 3.77–5.28)
SARS-COV-2 RNA RESP QL NAA+PROBE: NOT DETECTED
SODIUM BLD-SCNC: 144 MMOL/L (ref 136–145)
WBC NRBC COR # BLD: 9.86 10*3/MM3 (ref 3.4–10.8)

## 2020-04-20 PROCEDURE — 97164 PT RE-EVAL EST PLAN CARE: CPT

## 2020-04-20 PROCEDURE — 85610 PROTHROMBIN TIME: CPT | Performed by: INTERNAL MEDICINE

## 2020-04-20 PROCEDURE — 84145 PROCALCITONIN (PCT): CPT | Performed by: INTERNAL MEDICINE

## 2020-04-20 PROCEDURE — 25010000002 ENOXAPARIN PER 10 MG: Performed by: FAMILY MEDICINE

## 2020-04-20 PROCEDURE — 80053 COMPREHEN METABOLIC PANEL: CPT | Performed by: INTERNAL MEDICINE

## 2020-04-20 PROCEDURE — 82962 GLUCOSE BLOOD TEST: CPT

## 2020-04-20 PROCEDURE — 87635 SARS-COV-2 COVID-19 AMP PRB: CPT | Performed by: INTERNAL MEDICINE

## 2020-04-20 PROCEDURE — 97168 OT RE-EVAL EST PLAN CARE: CPT | Performed by: OCCUPATIONAL THERAPIST

## 2020-04-20 PROCEDURE — 25010000002 FUROSEMIDE PER 20 MG: Performed by: INTERNAL MEDICINE

## 2020-04-20 PROCEDURE — 85025 COMPLETE CBC W/AUTO DIFF WBC: CPT | Performed by: INTERNAL MEDICINE

## 2020-04-20 PROCEDURE — 83615 LACTATE (LD) (LDH) ENZYME: CPT | Performed by: INTERNAL MEDICINE

## 2020-04-20 PROCEDURE — 63710000001 INSULIN LISPRO (HUMAN) PER 5 UNITS: Performed by: FAMILY MEDICINE

## 2020-04-20 PROCEDURE — 82728 ASSAY OF FERRITIN: CPT | Performed by: INTERNAL MEDICINE

## 2020-04-20 PROCEDURE — 63710000001 INSULIN DETEMIR PER 5 UNITS: Performed by: INTERNAL MEDICINE

## 2020-04-20 PROCEDURE — 86140 C-REACTIVE PROTEIN: CPT | Performed by: INTERNAL MEDICINE

## 2020-04-20 RX ORDER — FUROSEMIDE 10 MG/ML
40 INJECTION INTRAMUSCULAR; INTRAVENOUS ONCE
Status: COMPLETED | OUTPATIENT
Start: 2020-04-20 | End: 2020-04-20

## 2020-04-20 RX ADMIN — INSULIN LISPRO 2 UNITS: 100 INJECTION, SOLUTION INTRAVENOUS; SUBCUTANEOUS at 20:55

## 2020-04-20 RX ADMIN — SODIUM CHLORIDE, PRESERVATIVE FREE 10 ML: 5 INJECTION INTRAVENOUS at 08:00

## 2020-04-20 RX ADMIN — ROSUVASTATIN CALCIUM 20 MG: 20 TABLET, FILM COATED ORAL at 20:53

## 2020-04-20 RX ADMIN — FUROSEMIDE 40 MG: 10 INJECTION, SOLUTION INTRAMUSCULAR; INTRAVENOUS at 12:14

## 2020-04-20 RX ADMIN — INSULIN DETEMIR 40 UNITS: 100 INJECTION, SOLUTION SUBCUTANEOUS at 08:00

## 2020-04-20 RX ADMIN — FLUOXETINE 10 MG: 10 CAPSULE ORAL at 08:00

## 2020-04-20 RX ADMIN — Medication 100 MG: at 08:00

## 2020-04-20 RX ADMIN — ENOXAPARIN SODIUM 30 MG: 30 INJECTION SUBCUTANEOUS at 17:00

## 2020-04-20 RX ADMIN — INSULIN DETEMIR 40 UNITS: 100 INJECTION, SOLUTION SUBCUTANEOUS at 20:53

## 2020-04-20 NOTE — PROGRESS NOTES
Continued Stay Note  [facility]     Patient Name: Gema Chapman  MRN: [id number]  Today's Date: 4/20/2020    Admit Date: 4/4/2020    Discharge Plan     Row Name 04/20/20 0957       Plan    Plan  Attempted to contact patient's daughter, Celena Shaver [phone] to discuss dc planning.  Received voicemail.  Voicemail left requesting return call.        Discharge Codes    No documentation.             JOELLEN Parrish

## 2020-04-20 NOTE — PLAN OF CARE
Problem: Patient Care Overview  Goal: Plan of Care Review  Outcome: Ongoing (interventions implemented as appropriate)   OT re-evaluation completed. Pt has no complaints, reports feeling much better. Pt completed functional mobility with RW within room with no assistance from RN. No ROM or coordination deficits noted. Pt reports independent with LB dressing, toilet hygiene and all ADLs. RN confirms. Pt reports staff just supervise task but she completes independently. Pt reports she feels back to her baseline. No further skilled OT needs at this time. OT to sign off, with recommendations to cont BUE HEP. Please re-consult if new needs or concerns arise. Anticipated d/c home with family.

## 2020-04-20 NOTE — PROGRESS NOTES
Continued Stay Note  [facility]     Patient Name: Gema Chapman  MRN: [id number]  Today's Date: 4/20/2020    Admit Date: 4/4/2020    Discharge Plan     Row Name 04/20/20 1029       Plan    Plan  Home    Patient/Family in Agreement with Plan  yes    Plan Comments  SW spoke with patient's daughter, Celena Shaver [phone], regarding discharge plan.  Daughter is agreeable to patient returning home upon discharge with her.  Daughter advised she has also spoke with physician and it is her understanding patient will need a negative COVID test to return home due to compromised family members residing at residence.  SW will follow and assist with disposition.    Row Name 04/20/20 0957       Plan    Plan  Attempted to contact patient's daughter, Celena Shaver [phone] to discuss dc planning.  Received voicemail.  Voicemail left requesting return call.        Discharge Codes    No documentation.             IVORY ParrishW

## 2020-04-20 NOTE — THERAPY DISCHARGE NOTE
Acute Care - Occupational Therapy Re-Eval/Discharge  [facility]     Patient Name: Gema Chapman  : 1940  MRN: [id number]  Today's Date: 2020  Onset of Illness/Injury or Date of Surgery: 20  Date of Referral to OT: 04/10/20  Referring Physician: Dr. Kilgore      Admit Date: 2020       ICD-10-CM ICD-9-CM   1. Febrile illness R50.9 780.60   2. Pneumonia due to infectious organism, unspecified laterality, unspecified part of lung J18.9 136.9     484.8   3. Chronic kidney disease, unspecified CKD stage N18.9 585.9   4. Transaminitis R74.0 790.4   5. COVID-19 virus infection U07.1    6. Impaired mobility and ADLs Z74.09 799.89   7. Impaired mobility Z74.09 799.89     Patient Active Problem List   Diagnosis   • Febrile illness   • COVID-19 virus detected   • Type 2 diabetes mellitus (CMS/[facility])   • CKD (chronic kidney disease) stage 4, GFR 15-29 ml/min (CMS/HCC)   • Obesity (BMI 30-39.9)   • Elevated LFTs   • Elevated LDH   • Elevated ferritin   • Essential hypertension   • Vascular dementia without behavioral disturbance (CMS/HCC)   • Reactive depression     Past Medical History:   Diagnosis Date   • Diabetes (CMS/HCC)    • Hyperlipemia    • Hypertension    • Renal disease      Past Surgical History:   Procedure Laterality Date   • CHOLECYSTECTOMY     • HIP ARTHROPLASTY Left           OT ASSESSMENT FLOWSHEET (last 12 hours)      Occupational Therapy Evaluation     Row Name 20 1445                   OT Evaluation Time/Intention    Subjective Information  no complaints  -MM        Document Type  re-evaluation;progress note/recertification;discharge evaluation/summary  -MM        Mode of Treatment  occupational therapy  -MM        Comment  telecommunication with RN present in room  -MM           General Information    Patient Profile Reviewed?  yes  -MM        Patient Observations  alert;cooperative;agree to therapy  -MM        Patient/Family Observations  RN present with Ipad for  telecommunication  -MM        General Observations of Patient  reclined in chair, no apparent distress  -MM        Existing Precautions/Restrictions  fall COVID+  -MM        Barriers to Rehab  -- COVID+  -MM           Cognitive Assessment/Intervention- PT/OT    Affect/Mental Status (Cognitive)  WNL  -MM        Orientation Status (Cognition)  oriented x 4  -MM        Follows Commands (Cognition)  WNL  -MM        Cognitive Function (Cognitive)  WNL  -MM        Personal Safety Interventions  fall prevention program maintained;nonskid shoes/slippers when out of bed;supervised activity  -MM           Safety Issues, Functional Mobility    Comment, Safety Issues/Impairments (Mobility)  Pt reports she feels that she is back to her baseline. RN reports pt up ad javier with no concerns  -MM           Bed Mobility Assessment/Treatment    Comment (Bed Mobility)  in chair pre and post tx  -MM           Functional Mobility    Functional Mobility- Ind. Level  supervision required  -MM        Functional Mobility- Device  rolling walker  -MM        Functional Mobility- Comment  chair to door to chair  -MM           Transfer Assessment/Treatment    Transfer Assessment/Treatment  sit-stand transfer;stand-sit transfer  -MM           Sit-Stand Transfer    Sit-Stand Wirt (Transfers)  supervision  -MM        Assistive Device (Sit-Stand Transfers)  walker, front-wheeled  -MM           Stand-Sit Transfer    Stand-Sit Wirt (Transfers)  supervision  -MM        Assistive Device (Stand-Sit Transfers)  walker, front-wheeled  -MM           Lower Body Dressing Assessment/Training    Comment (Lower Body Dressing)  Pt reports independent with LB dressing, toilet hygiene and all ADLs. RN confirms. Pt reports staff just supervise task but she completes independently.  -MM           General ROM    GENERAL ROM COMMENTS  BUE AROM WFL  -MM           MMT (Manual Muscle Testing)    General MMT Comments  BUE appear functionally 4/5  -MM            Motor Assessment/Interventions    Additional Documentation  Fine Motor Testing & Training (Group);Gross Motor Coordination (Group)  -MM           Gross Motor Coordination    Gross Motor Skill, Impairments Detail  intact BUE functionally  -MM           Fine Motor Testing & Training    Comment, Fine Motor Coordination  intact BUE functionally  -MM           Sensory Assessment/Intervention    Sensory General Assessment  no sensation deficits identified per pt report  -MM           Positioning and Restraints    Pre-Treatment Position  sitting in chair/recliner  -MM        Post Treatment Position  chair  -MM        In Chair  sitting;call light within reach;encouraged to call for assist;with nsg  -MM           Pain Scale: Numbers Pre/Post-Treatment    Pain Scale: Numbers, Pretreatment  0/10 - no pain  -MM        Pre/Post Treatment Pain Comment  no complaints reports feeling much better  -MM           Wound 04/05/20 0800 Bilateral coccyx MASD (Moisture associated skin damage)    Wound - Properties Group Date first assessed: 04/05/20  - Time first assessed: 0800  - Present on Hospital Admission: Y  -MC Side: Bilateral  -MC Location: coccyx  -MC Primary Wound Type: MASD  -       Plan of Care Review    Plan of Care Reviewed With  patient  -MM        Progress  improving  -MM        Outcome Summary  OT re-evaluation completed. Pt has no complaints, reports feeling much better. Pt completed functional mobility with RW within room with no assistance from RN. No ROM or coordination deficits noted. Pt reports independent with LB dressing, toilet hygiene and all ADLs. RN confirms. Pt reports staff just supervise task but she completes independently. Pt reports she feels back to her baseline. No further skilled OT needs at this time. OT to sign off, with recommendations to cont BUE HEP. Please re-consult if new needs or concerns arise. Anticipated d/c home with family.  -MM           Clinical Impression (OT)    Date of  Referral to OT  04/10/20  -MM        Prognosis (OT Eval)  excellent  -MM        Functional Level at Time of Evaluation (OT Eval)  excellent  -MM        Criteria for Skilled Therapeutic Interventions Met (OT Eval)  no;no problems identified which require skilled intervention;current level of function same as previous level of function  -MM        Therapy Frequency (OT Eval)  evaluation only re-eval only  -MM        Predicted Duration of Therapy Intervention (Therapy Eval)  until d/c  -MM        Care Plan Review (OT)  evaluation/treatment results reviewed;care plan/treatment goals reviewed;current/potential barriers reviewed;risks/benefits reviewed;patient/other agree to care plan  -MM        Care Plan Review, Other Participant (OT Eval)  other (see comments) CECY louis  -MM        Anticipated Discharge Disposition (OT)  home with assist  -MM           OT Goals    Dressing Goal Selection (OT)  dressing, OT goal 1  -MM        Toileting Goal Selection (OT)  toileting, OT goal 1  -MM        Grooming Goal Selection (OT)  grooming, OT goal 1  -MM        Activity Tolerance Goal Selection (OT)  activity tolerance, OT goal 1  -MM        Additional Documentation  Grooming Goal Selection (OT) (Row);Activity Tolerance Goal Selection (OT) (Row)  -MM           Dressing Goal 1 (OT)    Activity/Assistive Device (Dressing Goal 1, OT)  dressing skills, all  -MM        [place]/Cues Needed (Dressing Goal 1, OT)  supervision required;set-up required;tactile cues required;verbal cues required  -MM        Time Frame (Dressing Goal 1, OT)  10 days  -MM        Progress/Outcome (Dressing Goal 1, OT)  goal met  -MM           Toileting Goal 1 (OT)    Activity/Device (Toileting Goal 1, OT)  toileting skills, all;commode, bedside with drop arms;commode, bedside without drop arms  -MM        [place] Level/Cues Needed (Toileting Goal 1, OT)  supervision required;set-up required;tactile cues required;verbal cues required  -MM        Time  Frame (Toileting Goal 1, OT)  10 days  -MM        Progress/Outcome (Toileting Goal 1, OT)  goal met  -MM           Grooming Goal 1 (OT)    Activity/Device (Grooming Goal 1, OT)  grooming skills, all  -MM        [place] (Grooming Goal 1, OT)  supervision required;set-up required;tactile cues required;verbal cues required  -MM        Time Frame (Grooming Goal 1, OT)  10 days  -MM        Progress/Outcome (Grooming Goal 1, OT)  goal met  -MM            Activity Tolerance Goal 1 (OT)    Activity Tolerance Goal 1 (OT)  Pt will participate in 5 min of ADL activity with O2 sats at or above 88%.  -MM        Activity Level (Endurance Goal 1, OT)  5 min activity;O2 sat >/ equal to 88%  -MM        Time Frame (Activity Tolerance Goal 1, OT)  10 days  -MM        Progress/Outcome (Activity Tolerance Goal 1, OT)  goal met  -MM          User Key  (r) = Recorded By, (t) = Taken By, (c) = Cosigned By    Initials Name Effective Dates    Lynette Knapp RN 08/02/16 -     MM Leno Mejía, OTR/L 04/03/18 -               OT Recommendation and Plan  Outcome Summary/Treatment Plan (OT)  Daily Summary of Progress (OT): progress toward functional goals is good  Barriers to Overall Progress (OT): decreased endurance  Plan for Continued Treatment (OT): cont OT POC  Anticipated Discharge Disposition (OT): home with assist  Planned Therapy Interventions (OT Eval): activity tolerance training, adaptive equipment training, BADL retraining, functional balance retraining, occupation/activity based interventions, passive ROM/stretching, patient/caregiver education/training, strengthening exercise, ROM/therapeutic exercise, transfer/mobility retraining, wheelchair assessment/training  Therapy Frequency (OT Eval): evaluation only(re-eval only)  Daily Summary of Progress (OT): progress toward functional goals is good  Plan of Care Review  Plan of Care Reviewed With: patient  Plan of Care Reviewed With: patient  Outcome Summary: OT  re-evaluation completed. Pt has no complaints, reports feeling much better. Pt completed functional mobility with RW within room with no assistance from RN. No ROM or coordination deficits noted. Pt reports independent with LB dressing, toilet hygiene and all ADLs. RN confirms. Pt reports staff just supervise task but she completes independently. Pt reports she feels back to her baseline. No further skilled OT needs at this time. OT to sign off, with recommendations to cont BUE HEP. Please re-consult if new needs or concerns arise. Anticipated d/c home with family.     Rehab Goal Summary     Row Name 04/20/20 1445 04/20/20 1430          Bed Mobility Goal 1 (PT)    Activity/Assistive Device (Bed Mobility Goal 1, PT)  --  sit to supine/supine to sit  -AGNIESZKA     [place] Level/Cues Needed (Bed Mobility Goal 1, PT)  --  independent  -AGNIESZKA     Time Frame (Bed Mobility Goal 1, PT)  --  long term goal (LTG);10 days  -AGNIESZKA     Progress/Outcomes (Bed Mobility Goal 1, PT)  --  goal met  -AGNIESZKA        Transfer Goal 1 (PT)    Activity/Assistive Device (Transfer Goal 1, PT)  --  sit-to-stand/stand-to-sit;bed-to-chair/chair-to-bed;walker, rolling  -AGNIESZKA     [place] Level/Cues Needed (Transfer Goal 1, PT)  --  supervision required  -AGNIESZKA     Time Frame (Transfer Goal 1, PT)  --  long term goal (LTG);10 days  -AGNIESZKA     Progress/Outcome (Transfer Goal 1, PT)  --  goal met  -AGNIESZKA        ROM Goal 1 (PT)    ROM Goal 1 (PT)  --  Independent B LE HEP x 20 reps  -AGNIESZKA     Time Frame (ROM Goal 1, PT)  --  long term goal (LTG)  -AGNIESZKA     Progress/Outcome (ROM Goal 1, PT)  --  goal met  -AGNIESZKA        Occupational Therapy Goals    Dressing Goal Selection (OT)  dressing, OT goal 1  -MM  --     Toileting Goal Selection (OT)  toileting, OT goal 1  -MM  --     Grooming Goal Selection (OT)  grooming, OT goal 1  -MM  --     Activity Tolerance Goal Selection (OT)  activity tolerance, OT goal 1  -MM  --        Dressing Goal 1 (OT)    Activity/Assistive Device  (Dressing Goal 1, OT)  dressing skills, all  -MM  --     [place]/Cues Needed (Dressing Goal 1, OT)  supervision required;set-up required;tactile cues required;verbal cues required  -MM  --     Time Frame (Dressing Goal 1, OT)  10 days  -MM  --     Progress/Outcome (Dressing Goal 1, OT)  goal met  -MM  --        Toileting Goal 1 (OT)    Activity/Device (Toileting Goal 1, OT)  toileting skills, all;commode, bedside with drop arms;commode, bedside without drop arms  -MM  --     [place] Level/Cues Needed (Toileting Goal 1, OT)  supervision required;set-up required;tactile cues required;verbal cues required  -MM  --     Time Frame (Toileting Goal 1, OT)  10 days  -MM  --     Progress/Outcome (Toileting Goal 1, OT)  goal met  -MM  --        Grooming Goal 1 (OT)    Activity/Device (Grooming Goal 1, OT)  grooming skills, all  -MM  --     [place] (Grooming Goal 1, OT)  supervision required;set-up required;tactile cues required;verbal cues required  -MM  --     Time Frame (Grooming Goal 1, OT)  10 days  -MM  --     Progress/Outcome (Grooming Goal 1, OT)  goal met  -MM  --         Activity Tolerance Goal 1 (OT)    Activity Tolerance Goal 1 (OT)  Pt will participate in 5 min of ADL activity with O2 sats at or above 88%.  -MM  --     Activity Level (Endurance Goal 1, OT)  5 min activity;O2 sat >/ equal to 88%  -MM  --     Time Frame (Activity Tolerance Goal 1, OT)  10 days  -MM  --     Progress/Outcome (Activity Tolerance Goal 1, OT)  goal met  -MM  --       User Key  (r) = Recorded By, (t) = Taken By, (c) = Cosigned By    Initials Name Provider Type Discipline    Jeff Arvizu, PT DPT Physical Therapist PT    MM Leno Mejía, OTR/L Occupational Therapist OT          Outcome Measures     Row Name 04/20/20 1600             How much help from another is currently needed...    Putting on and taking off regular lower body clothing?  4  -MM      Bathing (including washing, rinsing, and drying)  4  -MM       Toileting (which includes using toilet bed pan or urinal)  4  -MM      Putting on and taking off regular upper body clothing  4  -MM      Taking care of personal grooming (such as brushing teeth)  4  -MM      Eating meals  4  -MM      AM-PAC 6 Clicks Score (OT)  24  -MM         Functional Assessment    Outcome Measure Options  AM-PAC 6 Clicks Daily Activity (OT)  -MM        User Key  (r) = Recorded By, (t) = Taken By, (c) = Cosigned By    Initials Name Provider Type    Lneo Castro OTR/L Occupational Therapist          Time Calculation:   Time Calculation- OT     Row Name 04/20/20 1629             Time Calculation- OT    OT Start Time  1430  -MM      OT Stop Time  1455  -MM      OT Time Calculation (min)  25 min  -MM      OT Received On  04/20/20  -MM        User Key  (r) = Recorded By, (t) = Taken By, (c) = Cosigned By    Initials Name Provider Type    Leno Castro OTR/L Occupational Therapist        Therapy Suggested Charges     Code   Minutes Charges    94351 (CPT®) Hc Ot Neuromusc Re Education Ea 15 Min      35158 (CPT®) Hc Ot Ther Proc Ea 15 Min      64795 (CPT®) Hc Ot Therapeutic Act Ea 15 Min      66704 (CPT®) Hc Ot Manual Therapy Ea 15 Min      33715 (CPT®) Hc Ot Iontophoresis Ea 15 Min      20199 (CPT®) Hc Ot Elec Stim Ea-Per 15 Min      76464 (CPT®) Hc Ot Ultrasound Ea 15 Min      34275 (CPT®) Hc Ot Self Care/Mgmt/Train Ea 15 Min 8 1    Total  8 1        Therapy Charges for Today     Code Description Service Date Service Provider Modifiers Qty    22332188545 HC OT RE-EVAL 2 4/20/2020 Leno Mejía OTR/L GO 1               OT Discharge Summary  Anticipated Discharge Disposition (OT): home with assist  Reason for Discharge: Independent, At baseline function  Outcomes Achieved: Refer to plan of care for updates on goals achieved(re-evaluation/discharge)  Discharge Destination: Home with assist(expected d/c )    YASIR Acharya/VI  4/20/2020

## 2020-04-20 NOTE — PLAN OF CARE
Problem: Patient Care Overview  Goal: Plan of Care Review  Outcome: Ongoing (interventions implemented as appropriate)  Flowsheets  Taken 4/20/2020 0350  Progress: improving  Taken 4/19/2020 2050  Plan of Care Reviewed With: patient  Note:   VSS. No c/o pain. Re-swab for COVID today. Pt ambulates well to bathroom with walker.

## 2020-04-20 NOTE — PROGRESS NOTES
"Infectious Diseases Progress Note    Patient:  Gema Chapman  YOB: 1940  MRN: [id number]   Admit date: 4/4/2020   Admitting Physician: Jesse Kilgore MD  Primary Care Physician: Duy Hall MD    Chief Complaint/Interval History: Here to evaluate patient.  Nurses not going in the room currently.  Discussed her clinical course with nursing.  Interval notes reviewed.  She continues to do well.  She remains on room air.  She is without fever.  Screening follow-up COVID test this morning came back negative.  Another test ordered for tomorrow.    No intake or output data in the 24 hours ending 04/20/20 1858  Allergies: No Known Allergies  Current Scheduled Medications:     enoxaparin 30 mg Subcutaneous Q24H   FLUoxetine 10 mg Oral Daily   insulin detemir 40 Units Subcutaneous Q12H   insulin lispro 2-7 Units Subcutaneous 4x Daily With Meals & Nightly   rosuvastatin 20 mg Oral Nightly   sodium chloride 10 mL Intravenous Q12H   zinc gluconate 100 mg Oral Daily     Current PRN Medications:  •  acetaminophen **OR** acetaminophen **OR** acetaminophen  •  dextrose  •  dextrose  •  glucagon (human recombinant)  •  ondansetron  •  sodium chloride    Review of Systems see HPI    Vital Signs:  BP 96/60 (BP Location: Left arm, Patient Position: Lying)   Pulse 56   Temp 97.8 °F (36.6 °C) (Oral)   Resp 22   Ht 167.6 cm (66\")   Wt 85.4 kg (188 lb 4.4 oz)   SpO2 100%   BMI 30.39 kg/m²     Physical Exam  Vital signs - reviewed.  Interval notes and vital signs reviewed.  Discussed physical exam with nursing.  Exam per hospitalist reviewed.    Lab Results:  CBC: Results from last 7 days   Lab Units 04/20/20  0754   WBC 10*3/mm3 9.86   HEMOGLOBIN g/dL 10.7*   HEMATOCRIT % 33.8*   PLATELETS 10*3/mm3 341     BMP:  Results from last 7 days   Lab Units 04/20/20  0754   SODIUM mmol/L 144   POTASSIUM mmol/L 4.1   CHLORIDE mmol/L 106   CO2 mmol/L 24.0   BUN mg/dL 43*   CREATININE mg/dL 1.62*   GLUCOSE mg/dL " 83   CALCIUM mg/dL 9.4   ALT (SGPT) U/L 67*     COVID-19 PCR sent April 19, 2020-negative    Additional Studies Reviewed: None    Impression:   COVID-19 pneumonia.  Remains improved.    Recommendations:   Repeat COVID-19 PCR done tomorrow  If her repeat COVID-19 test tomorrow negative, enhanced droplet precautions can be discontinued    Drew Castaneda MD

## 2020-04-20 NOTE — PROGRESS NOTES
"    [facility] Medicine Services  INPATIENT PROGRESS NOTE    Patient Name: Gema Chapman  Date of Admission: 4/4/2020  Today's Date: 04/20/20  Length of Stay: 16  Primary Care Physician: Duy Hall MD    Subjective   Chief Complaint: \"doing well\"  HPI       Patient was seen and examined at bedside.  Appropriate PPE was worn into the room.  The patient is overall doing fantastic.  She is sitting up in the chair with her feet up.  She ate all of her breakfast.  She denies any nausea vomiting or diarrhea.  Patient has been afebrile and had no medically stable.  She continues to have brief runs of bradycardia while she is sleeping at night, but she is very asymptomatic with these.  She has not had any symptomatic bradycardia while awake.  Patient received a repeat nasal swab this morning, and COVID prognostic labs.  These will be reviewed below.          Review of Systems   Constitutional: Negative for activity change, appetite change, chills, diaphoresis and fatigue.   Respiratory: Negative for shortness of breath.    Cardiovascular: Negative for palpitations.   Gastrointestinal: Negative for abdominal distention and constipation.         All pertinent negatives and positives are as above. All other systems have been reviewed and are negative unless otherwise stated.     Objective    Temp:  [97.1 °F (36.2 °C)-98 °F (36.7 °C)] 97.6 °F (36.4 °C)  Heart Rate:  [54-69] 54  Resp:  [13-24] 21  BP: ()/(50-56) 95/52  Physical Exam   Constitutional: She is oriented to person, place, and time. No distress.   HENT:   Head: Normocephalic and atraumatic.   Eyes: Conjunctivae are normal. No scleral icterus.   Neck: Neck supple. No JVD present.   Cardiovascular: Normal rate and regular rhythm.   Telemetry reviewed    Pulmonary/Chest: Effort normal. No respiratory distress.   Neurological: She is alert and oriented to person, place, and time.   Skin: She is not diaphoretic. No " erythema. No pallor.   Psychiatric: She has a normal mood and affect. Her behavior is normal.   Nursing note and vitals reviewed.      This exam was performed remotely in the unit aided by real-time audio/visual communication tools and with assistance from nursing staff.      Results Review:  I have reviewed the labs, radiology results, and diagnostic studies.    Laboratory Data:   Results from last 7 days   Lab Units 04/20/20  0754   WBC 10*3/mm3 9.86   HEMOGLOBIN g/dL 10.7*   HEMATOCRIT % 33.8*   PLATELETS 10*3/mm3 341        Results from last 7 days   Lab Units 04/20/20  0754   SODIUM mmol/L 144   POTASSIUM mmol/L 4.1   CHLORIDE mmol/L 106   CO2 mmol/L 24.0   BUN mg/dL 43*   CREATININE mg/dL 1.62*   CALCIUM mg/dL 9.4   BILIRUBIN mg/dL 0.4   ALK PHOS U/L 103   ALT (SGPT) U/L 67*   AST (SGOT) U/L 28   GLUCOSE mg/dL 83       Culture Data:   No results found for: BLOODCX, URINECX, WOUNDCX, MRSACX, RESPCX, STOOLCX    Radiology Data:   Imaging Results (Last 24 Hours)     ** No results found for the last 24 hours. **          I have reviewed the patient's current medications.     Assessment/Plan     Active Hospital Problems    Diagnosis   • **COVID-19 virus detected   • Essential hypertension   • Vascular dementia without behavioral disturbance (CMS/HCC)   • Reactive depression   • Obesity (BMI 30-39.9)   • Elevated LFTs   • Elevated LDH   • Elevated ferritin   • CKD (chronic kidney disease) stage 4, GFR 15-29 ml/min (CMS/HCC)   • Febrile illness   • Type 2 diabetes mellitus (CMS/HCC)       Plan:  1.  Completed course of hydroxychloroquine (400 mg BID x 1 day, 200 mg BID x 4 days)  2.  Continues to be on room air, getting up and going to restroom on minimal assistance - Anticipate d/c home once not contagious   3.  Repeat COVID-19 testing was positive on 4/15 and 4/17  4.  Appreciate infectious disease input  5.  PT/OT (telehealth) - increase activity as possible; up in chair, walk around the room   6.  Continue  telemetry monitoring - having episodes of bradycardia - Asymptomatic  7.  Continue insulin Levemir to 40 units twice daily; outpt med rec list 61 units Lantus twice daily - Sliding scale insulin with qac/qhs accuchecks   8.  Continue supportive care  9.  Reswabbed this AM   10.  Ok to remove IV   11.  Discussed with Dr. Savana PERSON LABS: Labs were personally reviewed.  Patient shows significant improvement in his CRP, ferritin, and LDH.  The patient no longer has a lymphopenia, his white blood cell differential has normalized for the most part.  Procalcitonin is normal.  Clinically and laboratories have improved.  Results From Last 14 Days   Lab Units 04/20/20  0754 04/13/20  0832 04/11/20  0752   CK TOTAL U/L  --   --  29   CRP mg/dL 0.15 1.34* 3.07*   D DIMER QUANT mg/L (FEU)  --  7.29* 5.83*   FERRITIN ng/mL 575.00* 1,083.00* 1,358.00*   LDH U/L 204  --  248*   PROCALCITONIN ng/mL 0.06*  --  0.30*   PROTIME Seconds 13.7  --  15.5*   INR  1.06  --  1.24*       Lab Results   Component Value Date    LYMPHSABS 3.56 (H) 04/20/2020    LYMPHSABS 1.41 04/11/2020    NEUTROABS 5.29 04/20/2020    NEUTROABS 10.24 (H) 04/11/2020               Discharge Planning: I expect the patient to be discharged to home with assist vs. SNF in ? days.    [facility], [facility] and likely [place] will require two consecutive negative COVID 19 tests 24-48 hours apart.  And a minimum of 72 hours afebrile (without fever reducing meds).  Bed offers made are tentative until all medical clearances met due to bed availability as COVID positive patients coming from the hospital require a private room.    Jesse Kilgore MD   04/20/20   11:36

## 2020-04-20 NOTE — PLAN OF CARE
Problem: Patient Care Overview  Goal: Plan of Care Review  Flowsheets (Taken 4/20/2020 1430)  Outcome Summary: PT completed re-eval. The patient is now up independent in her room with RW. She ambulated within her room with RW during this re-eval. She needed no assistance from nursing, only supervision. The patient reports that she is back to her baseline mobility and she had no further PT needs, questions or concerns. She was educated on gait safety and to continue with her B LE exercises as needed. If PT becomes needed please reconsult therapy. PT to sign off, I anticipate she will d.c home with family.

## 2020-04-20 NOTE — THERAPY DISCHARGE NOTE
PT Re-eval/Discharge  Patient Name: Gema Chapman  : 1940    MRN: [id number]                              Today's Date: 2020       Admit Date: 2020    Visit Dx:     ICD-10-CM ICD-9-CM   1. Febrile illness R50.9 780.60   2. Pneumonia due to infectious organism, unspecified laterality, unspecified part of lung J18.9 136.9     484.8   3. Chronic kidney disease, unspecified CKD stage N18.9 585.9   4. Transaminitis R74.0 790.4   5. COVID-19 virus infection U07.1    6. Impaired mobility and ADLs Z74.09 799.89   7. Impaired mobility Z74.09 799.89     Patient Active Problem List   Diagnosis   • Febrile illness   • COVID-19 virus detected   • Type 2 diabetes mellitus (CMS/HCC)   • CKD (chronic kidney disease) stage 4, GFR 15-29 ml/min (CMS/HCC)   • Obesity (BMI 30-39.9)   • Elevated LFTs   • Elevated LDH   • Elevated ferritin   • Essential hypertension   • Vascular dementia without behavioral disturbance (CMS/HCC)   • Reactive depression     Past Medical History:   Diagnosis Date   • Diabetes (CMS/HCC)    • Hyperlipemia    • Hypertension    • Renal disease      Past Surgical History:   Procedure Laterality Date   • CHOLECYSTECTOMY     • HIP ARTHROPLASTY Left      General Information     Row Name 20 143          PT Evaluation Time/Intention    Document Type  re-evaluation;progress note/recertification;discharge evaluation/summary telecommunication with RN in room  -AGNIESZKA     Mode of Treatment  physical therapy  -AGNIESZKA     Row Name 20 143          General Information    Patient Profile Reviewed?  yes  -AGNIESZKA     Existing Precautions/Restrictions  fall COVID +  -AGNIESZKA     Barriers to Rehab  -- COVID +  -AGNIESZKA     Row Name 20 143          Cognitive Assessment/Intervention- PT/OT    Orientation Status (Cognition)  oriented x 4  -AGNIESZKA     Personal Safety Interventions  fall prevention program maintained;nonskid shoes/slippers when out of bed;supervised activity  -AGNIESZKA     Row Name 20 143          Safety  Issues, Functional Mobility    Comment, Safety Issues/Impairments (Mobility)  Patient reports that she is back to her baseline mobility  -AGNIESZKA       User Key  (r) = Recorded By, (t) = Taken By, (c) = Cosigned By    Initials Name Provider Type    Jeff Arvizu, PT DPT Physical Therapist        Mobility     Row Name 04/20/20 1430          Bed Mobility Assessment/Treatment    Comment (Bed Mobility)  In chair, left as found  -AGNIESZKA     Row Name 04/20/20 1430          Sit-Stand Transfer    Sit-Stand Price (Transfers)  supervision  -AGNIESZKA     Assistive Device (Sit-Stand Transfers)  walker, front-wheeled  -AGNIESZKA     Row Name 04/20/20 1430          Gait/Stairs Assessment/Training    Price Level (Gait)  supervision  -AGNIESZKA     Assistive Device (Gait)  walker, front-wheeled  -AGNIESZKA     Distance in Feet (Gait)  50  -AGNIESZKA     Pattern (Gait)  swing-through  -AGNIESZKA     Comment (Gait/Stairs)  (S) Patient/RN both report that she is up independent in her room with RW  -AGNIESZKA       User Key  (r) = Recorded By, (t) = Taken By, (c) = Cosigned By    Initials Name Provider Type    Jeff Arvizu, PT DPT Physical Therapist        Obj/Interventions     Row Name 04/20/20 1430          General ROM    GENERAL ROM COMMENTS  BLE AROM WFL  -AGNIESZKA     Row Name 04/20/20 1430          MMT (Manual Muscle Testing)    General MMT Comments  B LE appear functionally 4/5  -AGNIESZKA     Row Name 04/20/20 1430          Static Sitting Balance    Level of Price (Unsupported Sitting, Static Balance)  independent  -AGNIESZKA     Sitting Position (Unsupported Sitting, Static Balance)  sitting in chair  -AGNIESZKA     Row Name 04/20/20 1430          Dynamic Sitting Balance    Level of Price, Reaches Outside Midline (Sitting, Dynamic Balance)  independent  -AGNIESZKA     Sitting Position, Reaches Outside Midline (Sitting, Dynamic Balance)  sitting in chair  -AGNIESZKA     Row Name 04/20/20 1430          Static Standing Balance    Level of Price (Supported Standing, Static  Balance)  supervision  -AGNIESZKA     Assistive Device Utilized (Supported Standing, Static Balance)  walker, rolling  -AGNIESZKA     Row Name 04/20/20 1430          Dynamic Standing Balance    Level of Holly, Reaches Outside Midline (Standing, Dynamic Balance)  supervision  -AGNIESZKA     Assistive Device Utilized (Supported Standing, Dynamic Balance)  walker, rolling  -AGNIESZKA       User Key  (r) = Recorded By, (t) = Taken By, (c) = Cosigned By    Initials Name Provider Type    Jeff Arvizu, PT DPT Physical Therapist        Goals/Plan     Row Name 04/20/20 1430          Bed Mobility Goal 1 (PT)    Activity/Assistive Device (Bed Mobility Goal 1, PT)  sit to supine/supine to sit  -AGNIESZKA     Holly Level/Cues Needed (Bed Mobility Goal 1, PT)  independent  -AGNIESZKA     Time Frame (Bed Mobility Goal 1, PT)  long term goal (LTG);10 days  -AGNIESZKA     Progress/Outcomes (Bed Mobility Goal 1, PT)  goal met  -AGNIESZKA     Row Name 04/20/20 1430          Transfer Goal 1 (PT)    Activity/Assistive Device (Transfer Goal 1, PT)  sit-to-stand/stand-to-sit;bed-to-chair/chair-to-bed;walker, rolling  -AGNIESZKA     Holly Level/Cues Needed (Transfer Goal 1, PT)  supervision required  -AGNIESZKA     Time Frame (Transfer Goal 1, PT)  long term goal (LTG);10 days  -AGNIESZKA     Progress/Outcome (Transfer Goal 1, PT)  goal met  -AGNIESZKA     Row Name 04/20/20 1430          ROM Goal 1 (PT)    ROM Goal 1 (PT)  Independent B LE HEP x 20 reps  -AGNIESZKA     Time Frame (ROM Goal 1, PT)  long term goal (LTG)  -AGNIESZKA     Progress/Outcome (ROM Goal 1, PT)  goal met  -AGNIESZKA       User Key  (r) = Recorded By, (t) = Taken By, (c) = Cosigned By    Initials Name Provider Type    Jeff Arvizu, PT DPT Physical Therapist        Clinical Impression     Row Name 04/20/20 1430          Pain Assessment    Additional Documentation  Pain Scale: Numbers Pre/Post-Treatment (Group)  -AGNIESZKA     Row Name 04/20/20 1430          Pain Scale: Numbers Pre/Post-Treatment    Pain Scale: Numbers, Pretreatment  0/10 - no  pain  -AGNIESZKA     Pre/Post Treatment Pain Comment  no complaints today, reports feeling much improved  -AGNIESZKA     [facility] Name 04/20/20 1430          Plan of Care Review    Plan of Care Reviewed With  patient  -AGNIESZKA     Progress  improving  -AGNIESZKA     Row Name 04/20/20 1430          Physical Therapy Clinical Impression    Patient/Family Goals Statement (PT Clinical Impression)  increase mobility  -AGNIESZKA     Criteria for Skilled Interventions Met (PT Clinical Impression)  no;current level of function same as previous level of function;no problems identified which require skilled intervention  -AGNIESZKA     [facility] Name 04/20/20 1430          Positioning and Restraints    Pre-Treatment Position  sitting in chair/recliner  -AGNIESZKA     Post Treatment Position  chair  -AGNIESZKA     In Chair  sitting;call light within reach;encouraged to call for assist;with nsg  -AGNIESZKA       User Key  (r) = Recorded By, (t) = Taken By, (c) = Cosigned By    Initials Name Provider Type    Jeff Arvizu, PT DPT Physical Therapist        Outcome Measures     [facility] Name 04/20/20 1430          How much help from another person do you currently need...    Turning from your back to your side while in flat bed without using bedrails?  4  -AGNIESZKA     Moving from lying on back to sitting on the side of a flat bed without bedrails?  4  -AGNIESZKA     Moving to and from a bed to a chair (including a wheelchair)?  4  -AGNIESZKA     Standing up from a chair using your arms (e.g., wheelchair, bedside chair)?  4  -AGNIESZKA     Climbing 3-5 steps with a railing?  3  -AGNIESZKA     To walk in hospital room?  4  -AGNIESZKA     AM-PAC 6 Clicks Score (PT)  23  -AGNIESZKA     Row Name 04/20/20 1430          Functional Assessment    Outcome Measure Options  AM-PAC 6 Clicks Basic Mobility (PT)  -AGNIESZKA       User Key  (r) = Recorded By, (t) = Taken By, (c) = Cosigned By    Initials Name Provider Type    Jeff Arvizu, PT DPT Physical Therapist          PT Recommendation and Plan  Planned Therapy Interventions (PT Eval): bed mobility  training, transfer training, gait training, balance training, home exercise program, patient/family education, postural re-education, strengthening  Outcome Summary/Treatment Plan (PT)  Anticipated Discharge Disposition (PT): home with assist  Plan of Care Reviewed With: patient  Progress: improving  Outcome Summary: PT completed re-eval. The patient is now up independent in her room with RW. She ambulated within her room with RW during this re-eval. She needed no assistance from nursing, only supervision. The patient reports that she is back to her baseline mobility and she had no further PT needs, questions or concerns. She was educated on gait safety and to continue with her B LE exercises as needed. If PT becomes needed please reconsult therapy. PT to sign off, I anticipate she will d.c home with family.     Time Calculation:   PT Charges     Row Name 04/20/20 1552             Time Calculation    Start Time  1430  -AGNIESZKA      Stop Time  1455  -AGNIESZKA      Time Calculation (min)  25 min  -AGNIESZKA      PT Received On  04/20/20  -AGNIESZKA        User Key  (r) = Recorded By, (t) = Taken By, (c) = Cosigned By    Initials Name Provider Type    Jeff Arvizu, PT DPT Physical Therapist        Therapy Charges for Today     Code Description Service Date Service Provider Modifiers Qty    [id number]  PT RE-EVAL ESTABLISHED PLAN 2 4/20/2020 Jeff Rey PT DPSANTO GP 1        Physical Therapy completed Re-Certification and Discharge Evaluation via audio and video conferencing.        PT G-Codes  Outcome Measure Options: AM-PAC 6 Clicks Basic Mobility (PT)  AM-PAC 6 Clicks Score (PT): 23  AM-PAC 6 Clicks Score (OT): 15    PT Discharge Summary  Anticipated Discharge Disposition (PT): home with assist    Jeff Rey PT DPT  4/20/2020

## 2020-04-20 NOTE — PLAN OF CARE
Problem: Patient Care Overview  Goal: Plan of Care Review  4/20/2020 1624 by Fara Farnsworth RN  Flowsheets (Taken 4/20/2020 1624)  Progress: improving  Outcome Summary: No significant change this shift, pt remains afebrile, VSS, Covid test done today resulted negative, will retest in am before possible discharge. Pt is up ad javier in room with assist of rolling walker. Will continue to monitor.

## 2020-04-21 ENCOUNTER — READMISSION MANAGEMENT (OUTPATIENT)
Dept: CALL CENTER | Facility: HOSPITAL | Age: 80
End: 2020-04-21

## 2020-04-21 VITALS
OXYGEN SATURATION: 97 % | HEART RATE: 54 BPM | TEMPERATURE: 97.9 F | SYSTOLIC BLOOD PRESSURE: 115 MMHG | RESPIRATION RATE: 18 BRPM | HEIGHT: 66 IN | DIASTOLIC BLOOD PRESSURE: 54 MMHG | WEIGHT: 188.27 LBS | BODY MASS INDEX: 30.26 KG/M2

## 2020-04-21 LAB
GLUCOSE BLDC GLUCOMTR-MCNC: 168 MG/DL (ref 70–130)
GLUCOSE BLDC GLUCOMTR-MCNC: 80 MG/DL (ref 70–130)
SARS-COV-2 RNA RESP QL NAA+PROBE: NOT DETECTED

## 2020-04-21 PROCEDURE — 63710000001 INSULIN LISPRO (HUMAN) PER 5 UNITS: Performed by: FAMILY MEDICINE

## 2020-04-21 PROCEDURE — 82962 GLUCOSE BLOOD TEST: CPT

## 2020-04-21 PROCEDURE — 87635 SARS-COV-2 COVID-19 AMP PRB: CPT | Performed by: INTERNAL MEDICINE

## 2020-04-21 PROCEDURE — 63710000001 INSULIN DETEMIR PER 5 UNITS: Performed by: INTERNAL MEDICINE

## 2020-04-21 RX ORDER — INSULIN GLARGINE 100 [IU]/ML
40 INJECTION, SOLUTION SUBCUTANEOUS 2 TIMES DAILY
Start: 2020-04-21

## 2020-04-21 RX ORDER — FLUOXETINE 10 MG/1
10 CAPSULE ORAL DAILY
Qty: 30 CAPSULE | Refills: 0 | Status: SHIPPED | OUTPATIENT
Start: 2020-04-21

## 2020-04-21 RX ADMIN — INSULIN LISPRO 2 UNITS: 100 INJECTION, SOLUTION INTRAVENOUS; SUBCUTANEOUS at 11:58

## 2020-04-21 RX ADMIN — FLUOXETINE 10 MG: 10 CAPSULE ORAL at 07:59

## 2020-04-21 RX ADMIN — INSULIN DETEMIR 40 UNITS: 100 INJECTION, SOLUTION SUBCUTANEOUS at 08:03

## 2020-04-21 RX ADMIN — Medication 100 MG: at 07:59

## 2020-04-21 NOTE — PLAN OF CARE
Problem: Patient Care Overview  Goal: Plan of Care Review  4/21/2020 0500 by Rosalina Lei  Outcome: Ongoing (interventions implemented as appropriate)  Flowsheets  Taken 4/21/2020 0500  Progress: improving  Taken 4/20/2020 2057  Plan of Care Reviewed With: patient  Note:   No c/o pain. VSS. Pt up in chair all night. Re-swab this am. Possible d/c if COVID re-swab is negative.

## 2020-04-21 NOTE — DISCHARGE SUMMARY
[facility] Medicine Services  DISCHARGE SUMMARY       Date of Admission: 4/4/2020  Date of Discharge:  4/21/2020  Primary Care Physician: Duy Hall MD    Presenting Problem/History of Present Illness:  sob, cough    Final Discharge Diagnoses:  Active Hospital Problems    Diagnosis   • **COVID-19 virus detected   • Essential hypertension   • Vascular dementia without behavioral disturbance (CMS/[facility])   • Reactive depression   • Obesity (BMI 30-39.9)   • Elevated LFTs   • Elevated LDH   • Elevated ferritin   • CKD (chronic kidney disease) stage 4, GFR 15-29 ml/min (CMS/[facility])   • Febrile illness   • Type 2 diabetes mellitus (CMS/[facility])       Consults: Infectious Disease, Palliative Care     Procedures Performed: None    Pertinent Test Results:   Imaging Results (Last 7 Days)     ** No results found for the last 168 hours. **        Lab Results (last 7 days)     Procedure Component Value Units Date/Time    SARS-CoV-2 PCR ([place] IN-HOUSE PERFORMED), NP SWAB IN TRANSPORT MEDIA - Swab, Nasopharynx [[id number]]  (Normal) Collected:  04/21/20 0623    Specimen:  Swab from Nasopharynx Updated:  04/21/20 1433     COVID19 Not Detected    POC Glucose Once [[id number]]  (Abnormal) Collected:  04/21/20 1151    Specimen:  Blood Updated:  04/21/20 1203     Glucose 168 mg/dL      Comment: : [id number] Toby Ojeda MMeter ID: [id number]       POC Glucose Once [[id number]]  (Normal) Collected:  04/21/20 0759    Specimen:  Blood Updated:  04/21/20 0809     Glucose 80 mg/dL      Comment: : [id number] Toby Ojeda MMeter ID: [id number]       POC Glucose Once [[id number]]  (Abnormal) Collected:  04/20/20 2051    Specimen:  Blood Updated:  04/20/20 2112     Glucose 170 mg/dL      Comment: : [id number] Quique GreenehMeter ID: [id number]       POC Glucose Once [[id number]]  (Normal) Collected:  04/20/20 1658    Specimen:  Blood Updated:  04/20/20 1709     Glucose 118 mg/dL       "Comment: : [id number] Javad Saab ID: [id number]       SARS-CoV-2, PCR (IN-HOUSE), NP Swab in Transport Media - Swab, Nasopharynx [[id number]]  (Normal) Collected:  04/20/20 0559    Specimen:  Swab from Nasopharynx Updated:  04/20/20 1426     COVID19 Not Detected    POC Glucose Once [[id number]]  (Abnormal) Collected:  04/20/20 1211    Specimen:  Blood Updated:  04/20/20 1223     Glucose 139 mg/dL      Comment: : [id number] Javad Saab ID: [id number]       Procalcitonin [[id number]]  (Abnormal) Collected:  04/20/20 0754    Specimen:  Blood Updated:  04/20/20 0848     Procalcitonin 0.06 ng/mL     Narrative:       As a Marker for Sepsis (Non-Neonates):   1. <0.5 ng/mL represents a low risk of severe sepsis and/or septic shock.  1. >2 ng/mL represents a high risk of severe sepsis and/or septic shock.    As a Marker for Lower Respiratory Tract Infections that require antibiotic therapy:  PCT on Admission     Antibiotic Therapy             6-12 Hrs later  > 0.5                Strongly Recommended            >0.25 - <0.5         Recommended  0.1 - 0.25           Discouraged                   Remeasure/reassess PCT  <0.1                 Strongly Discouraged          Remeasure/reassess PCT      As 28 day mortality risk marker: \"Change in Procalcitonin Result\" (> 80 % or <=80 %) if Day 0 (or Day 1) and Day 4 values are available. Refer to http://www.[facility]-pct-calculator.com/   Change in PCT <=80 %   A decrease of PCT levels below or equal to 80 % defines a positive change in PCT test result representing a higher risk for 28-day all-cause mortality of patients diagnosed with severe sepsis or septic shock.  Change in PCT > 80 %   A decrease of PCT levels of more than 80 % defines a negative change in PCT result representing a lower risk for 28-day all-cause mortality of patients diagnosed with severe sepsis or septic shock.                Results may be falsely decreased if patient taking Biotin.     C-reactive " Protein [[id number]]  (Normal) Collected:  04/20/20 0754    Specimen:  Blood Updated:  04/20/20 0847     C-Reactive Protein 0.15 mg/dL     Comprehensive Metabolic Panel [[id number]]  (Abnormal) Collected:  04/20/20 0754    Specimen:  Blood Updated:  04/20/20 0847     Glucose 83 mg/dL      BUN 43 mg/dL      Creatinine 1.62 mg/dL      Sodium 144 mmol/L      Potassium 4.1 mmol/L      Chloride 106 mmol/L      CO2 24.0 mmol/L      Calcium 9.4 mg/dL      Total Protein 6.6 g/dL      Albumin 3.10 g/dL      ALT (SGPT) 67 U/L      AST (SGOT) 28 U/L      Alkaline Phosphatase 103 U/L      Total Bilirubin 0.4 mg/dL      eGFR Non African Amer 30 mL/min/1.73      Globulin 3.5 gm/dL      A/G Ratio 0.9 g/dL      BUN/Creatinine Ratio 26.5     Anion Gap 14.0 mmol/L     Narrative:       GFR Normal >60  Chronic Kidney Disease <60  Kidney Failure <15      Lactate Dehydrogenase [[id number]]  (Normal) Collected:  04/20/20 0754    Specimen:  Blood Updated:  04/20/20 0847      U/L     Ferritin [[id number]]  (Abnormal) Collected:  04/20/20 0754    Specimen:  Blood Updated:  04/20/20 0847     Ferritin 575.00 ng/mL     Narrative:       Results may be falsely decreased if patient taking Biotin.      Protime-INR [[id number]]  (Normal) Collected:  04/20/20 0754    Specimen:  Blood Updated:  04/20/20 0827     Protime 13.7 Seconds      INR 1.06    CBC & Differential [[id number]] Collected:  04/20/20 0754    Specimen:  Blood Updated:  04/20/20 0821    Narrative:       The following orders were created for panel order CBC & Differential.  Procedure                               Abnormality         Status                     ---------                               -----------         ------                     CBC Auto Differential[[id number]]        Abnormal            Final result                 Please view results for these tests on the individual orders.    CBC Auto Differential [[id number]]  (Abnormal) Collected:  04/20/20 0754    Specimen:  Blood  Updated:  04/20/20 0821     WBC 9.86 10*3/mm3      RBC 3.48 10*6/mm3      Hemoglobin 10.7 g/dL      Hematocrit 33.8 %      MCV 97.1 fL      MCH 30.7 pg      MCHC 31.7 g/dL      RDW 14.3 %      RDW-SD 50.1 fl      MPV 10.4 fL      Platelets 341 10*3/mm3      Neutrophil % 53.7 %      Lymphocyte % 36.1 %      Monocyte % 6.9 %      Eosinophil % 2.2 %      Basophil % 0.8 %      Immature Grans % 0.3 %      Neutrophils, Absolute 5.29 10*3/mm3      Lymphocytes, Absolute 3.56 10*3/mm3      Monocytes, Absolute 0.68 10*3/mm3      Eosinophils, Absolute 0.22 10*3/mm3      Basophils, Absolute 0.08 10*3/mm3      Immature Grans, Absolute 0.03 10*3/mm3      nRBC 0.0 /100 WBC     POC Glucose Once [[id number]]  (Normal) Collected:  04/20/20 0756    Specimen:  Blood Updated:  04/20/20 0815     Glucose 81 mg/dL      Comment: : [id number] Javad Saab ID: [id number]       POC Glucose Once [[id number]]  (Abnormal) Collected:  04/19/20 2045    Specimen:  Blood Updated:  04/19/20 2106     Glucose 158 mg/dL      Comment: : [id number] Quique RamonaApple ID: [id number]       POC Glucose Once [[id number]]  (Abnormal) Collected:  04/19/20 1712    Specimen:  Blood Updated:  04/19/20 1730     Glucose 228 mg/dL      Comment: : [id number] Rakesh PeresaMeter ID: [id number]       POC Glucose Once [[id number]]  (Abnormal) Collected:  04/19/20 1226    Specimen:  Blood Updated:  04/19/20 1252     Glucose 199 mg/dL      Comment: : [id number] Rakesh PeresaMeter ID: [id number]       POC Glucose Once [[id number]]  (Normal) Collected:  04/19/20 0753    Specimen:  Blood Updated:  04/19/20 0805     Glucose 130 mg/dL      Comment: : [id number] Rakesh PeresaMeter ID: [id number]       POC Glucose Once [[id number]]  (Abnormal) Collected:  04/18/20 2001    Specimen:  Blood Updated:  04/18/20 2023     Glucose 211 mg/dL      Comment: : [id number] Quique Tanner ID: [id number]       POC Glucose Once [[id number]]  (Abnormal) Collected:  04/18/20 1620     Specimen:  Blood Updated:  04/18/20 1641     Glucose 209 mg/dL      Comment: : [id number] Rakesh TaraMeter ID: [id number]       POC Glucose Once [[id number]]  (Abnormal) Collected:  04/18/20 1138    Specimen:  Blood Updated:  04/18/20 1203     Glucose 192 mg/dL      Comment: : [id number] Rakesh TaraMeter ID: [id number]       POC Glucose Once [[id number]]  (Normal) Collected:  04/18/20 0850    Specimen:  Blood Updated:  04/18/20 0902     Glucose 119 mg/dL      Comment: : [id number] Rakesh TaraMeter ID: [id number]       SARS-CoV-2, PCR (IN-HOUSE), NP Swab in Transport Media - Swab, Nasopharynx [[id number]]  (Abnormal) Collected:  04/17/20 1058    Specimen:  Swab from Nasopharynx Updated:  04/18/20 0820     COVID19 Detected    POC Glucose Once [[id number]]  (Abnormal) Collected:  04/17/20 2047    Specimen:  Blood Updated:  04/17/20 2221     Glucose 268 mg/dL      Comment: : [id number] TelelogosnMeter ID: [id number]       POC Glucose Once [[id number]]  (Abnormal) Collected:  04/17/20 1732    Specimen:  Blood Updated:  04/17/20 1742     Glucose 186 mg/dL      Comment: : [id number] Karla Marport Deep Sea TechnologiesnahMeter ID: [id number]       POC Glucose Once [[id number]]  (Abnormal) Collected:  04/17/20 1111    Specimen:  Blood Updated:  04/17/20 1122     Glucose 224 mg/dL      Comment: : [id number] Karla HannahMeter ID: [id number]       POC Glucose Once [[id number]]  (Abnormal) Collected:  04/17/20 0756    Specimen:  Blood Updated:  04/17/20 0808     Glucose 132 mg/dL      Comment: : [id number] [employer] CanadanahMeter ID: [id number]       POC Glucose Once [[id number]]  (Abnormal) Collected:  04/16/20 2042    Specimen:  Blood Updated:  04/16/20 2103     Glucose 237 mg/dL      Comment: : [id number] TelelogosnMeter ID: [id number]       POC Glucose Once [[id number]]  (Abnormal) Collected:  04/16/20 1636    Specimen:  Blood Updated:  04/16/20 1647     Glucose 172 mg/dL      Comment: : [id number] Quinten  (Nelson) Raine Brannon ID: [id number]       POC Glucose Once [[id number]]  (Abnormal) Collected:  04/16/20 1200    Specimen:  Blood Updated:  04/16/20 1223     Glucose 240 mg/dL      Comment: : [id number] Monika AlegriaMeter ID: [id number]       POC Glucose Once [[id number]]  (Abnormal) Collected:  04/16/20 0820    Specimen:  Blood Updated:  04/16/20 0831     Glucose 179 mg/dL      Comment: : [id number] Castro PainterMeter ID: [id number]       SARS-CoV-2, PCR (IN-HOUSE), NP Swab in Transport Media - Swab, Nasopharynx [[id number]]  (Abnormal) Collected:  04/15/20 1703    Specimen:  Swab from Nasopharynx Updated:  04/16/20 0745     COVID19 Detected    POC Glucose Once [[id number]]  (Abnormal) Collected:  04/15/20 2024    Specimen:  Blood Updated:  04/15/20 2115     Glucose 315 mg/dL      Comment: : [id number] Sanya AwadMeter ID: [id number]       POC Glucose Once [[id number]]  (Abnormal) Collected:  04/15/20 1708    Specimen:  Blood Updated:  04/15/20 1719     Glucose 222 mg/dL      Comment: : [id number] Karla NatalioindigohMeter ID: [id number]       POC Glucose Once [[id number]]  (Abnormal) Collected:  04/15/20 1155    Specimen:  Blood Updated:  04/15/20 1206     Glucose 307 mg/dL      Comment: : [id number] Karla GreenehMeter ID: [id number]       POC Glucose Once [[id number]]  (Abnormal) Collected:  04/15/20 0815    Specimen:  Blood Updated:  04/15/20 0826     Glucose 224 mg/dL      Comment: : [id number] Karla NatalionahMeter ID: [id number]       POC Glucose Once [[id number]]  (Abnormal) Collected:  04/14/20 2051    Specimen:  Blood Updated:  04/14/20 2122     Glucose 354 mg/dL      Comment: : [id number] Sanya YoungherMeter ID: [id number]       POC Glucose Once [[id number]]  (Abnormal) Collected:  04/14/20 1715    Specimen:  Blood Updated:  04/14/20 1726     Glucose 198 mg/dL      Comment: : [id number] Karla Tanner ID: [id number]           Hospital Course:  The patient is a 80 y.o. female who  "presented to [facility] with shortness of breath and cough.  Patient had a long hospitalization, this will serve as a brief high level overview.    HPI per Dr. Bell:  \"80-year-old white female with known history of diabetes hypertension who has apparently been being followed somewhat on outpatient basis by her primary provider Dr. Guerrero for about a one-week history of cough shortness of breath and feeling poorly.  The history is obtained somewhat from the on-call provider today who called me, the ER provider as well as the patient however she is not a great historian.  She tells me she has had a cough and fever for 1 week.  She was tested for Kopit on outpatient basis which apparently resulted positive yesterday.  She continued to digress however feeling worse therefore decision was made by her daughter to send her to the hospital today.  She states she is does not have a great appetite.  In the ER the patient was not hypoxic.  She was found to have a white count of 15,000, an elevated LDH, elevated ferritin, elevated CRP, this x-ray fairly unremarkable other than some atelectasis, blood cultures obtained.\"    Hospital Course:  Patient was diagnosed with COVID-19.  Patient was hypoxic and required up to 10 L high flow nasal cannula.  This was able to eventually be titrated down.  Her course was also complicated by very poor appetite, this also slowly improved to where appetite was very good before discharge.    Patient received treatment with zinc, atorvastatin, and hydroxychloroquine (400 mg BID day 1 then 200 mg BID for 4 days).    Patient QTc monitored closely.      Before discharge we ensured symptoms improved for 7 days, afebrile, and two negative tests.     Patient initially was going to require SNF, but she improved enough to be able to go home.        Physical Exam on Discharge:  /54 (BP Location: Left arm, Patient Position: Sitting)   Pulse 54   Temp 97.9 °F (36.6 °C) (Oral)   Resp " "18   Ht 167.6 cm (66\")   Wt 85.4 kg (188 lb 4.4 oz)   SpO2 97%   BMI 30.39 kg/m²   Physical Exam  onstitutional: She is oriented to person, place, and time. No distress.   HENT:   Head: Normocephalic and atraumatic.   Eyes: Conjunctivae are normal. No scleral icterus.   Neck: Neck supple. No JVD present.   Cardiovascular: Normal rate and regular rhythm.   Telemetry reviewed    Pulmonary/Chest: Effort normal. No respiratory distress.   Neurological: She is alert and oriented to person, place, and time.   Skin: She is not diaphoretic. No erythema. No pallor.   Psychiatric: She has a normal mood and affect. Her behavior is normal.   Nursing note and vitals reviewed.    Condition on Discharge: Baseline    Discharge Disposition:  Home or Self Care    Discharge Medications:     Discharge Medications      New Medications      Instructions Start Date   FLUoxetine 10 MG capsule  Commonly known as:  PROzac   10 mg, Oral, Daily         Changes to Medications      Instructions Start Date   Lantus SoloStar 100 UNIT/ML injection pen  Generic drug:  Insulin Glargine  What changed:  how much to take   40 Units, Subcutaneous, 2 Times Daily         Continue These Medications      Instructions Start Date   acetaminophen 500 MG tablet  Commonly known as:  TYLENOL   1,000 mg, Oral, Daily      albuterol sulfate  (90 Base) MCG/ACT inhaler  Commonly known as:  PROVENTIL HFA;VENTOLIN HFA;PROAIR HFA   2 puffs, Inhalation, 4 Times Daily      allopurinol 300 MG tablet  Commonly known as:  ZYLOPRIM   300 mg, Oral, Daily      aspirin 81 MG EC tablet   81 mg, Oral, Daily      calcitriol 0.25 MCG capsule  Commonly known as:  ROCALTROL   0.25 mcg, Oral, Daily      famotidine 20 MG tablet  Commonly known as:  PEPCID   20 mg, Oral, Daily      fish oil 1000 MG capsule capsule   1,000 mg, Oral, Daily With Breakfast      gabapentin 100 MG capsule  Commonly known as:  NEURONTIN   100 mg, Oral, Daily      guaifenesin-dextromethorphan  MG " tablet sustained-release 12 hour tablet   1-2 tablets, Oral, 2 Times Daily      magnesium chloride ER 64 MG DR tablet   64 mg, Oral, Daily      NovoLOG FlexPen 100 UNIT/ML solution pen-injector sc pen  Generic drug:  insulin aspart   13 Units, Subcutaneous, 3 Times Daily Before Meals      rosuvastatin 20 MG tablet  Commonly known as:  CRESTOR   20 mg, Oral, Daily      Semaglutide(0.25 or 0.5MG/DOS) 2 MG/1.5ML solution pen-injector  Commonly known as:  OZEMPIC   0.5 mg, Subcutaneous, Weekly, Monday.      Vitamin D (Cholecalciferol) 50 MCG (2000 UT) capsule   2,000 Units, Oral, Daily         Stop These Medications    amLODIPine-benazepril 5-10 MG per capsule  Commonly known as:  LOTREL 5-10     torsemide 20 MG tablet  Commonly known as:  DEMADEX          Discharge Diet:   Diet Instructions     Diet: Regular, Consistent Carbohydrate; Thin      Discharge Diet:   Regular  Consistent Carbohydrate       Fluid Consistency:  Thin        Cardiac/Consistant Carbohydrate diet.                 Activity at Discharge:   Activity Instructions     Activity as Tolerated      Additional Activity Instructions:      Gradually resume your normal daily activity as tolerated.  Fall precautions.                   Follow-up Appointments:   No future appointments.    Test Results Pending at Discharge: None    Jesse Kilgore MD  04/21/20  15:03    Time: 35 minutes.

## 2020-04-22 ENCOUNTER — READMISSION MANAGEMENT (OUTPATIENT)
Dept: CALL CENTER | Facility: HOSPITAL | Age: 80
End: 2020-04-22

## 2020-04-22 NOTE — OUTREACH NOTE
Prep Survey      Responses   [facility] patient discharged from?  [place]   Is LACE score < 7 ?  No   Eligibility  Readm Mgmt   Discharge diagnosis  SOB r/t Covid   COVID-19 Test Status  Confirmed   Does the patient have one of the following disease processes/diagnoses(primary or secondary)?  Other   Does the patient have Home health ordered?  No   Is there a DME ordered?  No   Prep survey completed?  Yes          Palak Lizarraga RN

## 2020-04-22 NOTE — OUTREACH NOTE
Medical Week 1 Survey      Responses   [facility] patient discharged from?  [place]   COVID-19 Test Status  Confirmed   Does the patient have one of the following disease processes/diagnoses(primary or secondary)?  Other   Is there a successful TCM telephone encounter documented?  No   Week 1 attempt successful?  No   Unsuccessful attempts  Attempt 1          Sydni Acosta LPN

## 2020-04-23 ENCOUNTER — READMISSION MANAGEMENT (OUTPATIENT)
Dept: CALL CENTER | Facility: HOSPITAL | Age: 80
End: 2020-04-23

## 2020-04-23 NOTE — OUTREACH NOTE
COPD/PN Week 1 Survey      Responses   [facility] patient discharged from?  [place]   COVID-19 Test Status  Confirmed   Does the patient have one of the following disease processes/diagnoses(primary or secondary)?  Other          Sydni Acosta LPN

## 2020-04-24 ENCOUNTER — READMISSION MANAGEMENT (OUTPATIENT)
Dept: CALL CENTER | Facility: HOSPITAL | Age: 80
End: 2020-04-24

## 2020-04-24 NOTE — OUTREACH NOTE
Medical Week 1 Survey      Responses   [facility] patient discharged from?  [place]   COVID-19 Test Status  Confirmed   Does the patient have one of the following disease processes/diagnoses(primary or secondary)?  Other   Is there a successful TCM telephone encounter documented?  No   Week 1 attempt successful?  No          Sydni Acosta LPN

## 2020-04-27 ENCOUNTER — READMISSION MANAGEMENT (OUTPATIENT)
Dept: CALL CENTER | Facility: HOSPITAL | Age: 80
End: 2020-04-27

## 2020-04-27 NOTE — OUTREACH NOTE
COPD/PN Week 1 Survey      Responses   [facility] patient discharged from?  [place]   COVID-19 Test Status  Confirmed   Does the patient have one of the following disease processes/diagnoses(primary or secondary)?  Other   Revoke  Decline to participate [4TH UNSUCCESSFUL ATTEMPT]          Brook Wick LPN

## 2020-11-09 ENCOUNTER — TRANSCRIBE ORDERS (OUTPATIENT)
Dept: ADMINISTRATIVE | Facility: HOSPITAL | Age: 80
End: 2020-11-09

## 2020-11-09 ENCOUNTER — HOSPITAL ENCOUNTER (OUTPATIENT)
Dept: ULTRASOUND IMAGING | Facility: HOSPITAL | Age: 80
Discharge: HOME | End: 2020-11-09
Admitting: NURSE PRACTITIONER

## 2020-11-09 DIAGNOSIS — R10.30 LOWER ABDOMINAL PAIN: ICD-10-CM

## 2020-11-09 DIAGNOSIS — N93.9 ABNORMAL UTERINE AND VAGINAL BLEEDING, UNSPECIFIED: Primary | ICD-10-CM

## 2020-11-09 PROCEDURE — 76856 US EXAM PELVIC COMPLETE: CPT

## 2020-11-20 ENCOUNTER — TELEPHONE (OUTPATIENT)
Dept: HEMATOLOGY | Age: 80
End: 2020-11-20

## 2021-02-23 ENCOUNTER — IMMUNIZATION (OUTPATIENT)
Dept: VACCINE CLINIC | Facility: HOSPITAL | Age: 81
End: 2021-02-23

## 2021-02-23 PROCEDURE — 0011A: CPT | Performed by: OBSTETRICS & GYNECOLOGY

## 2021-02-23 PROCEDURE — 91301 HC SARSCO02 VAC 100MCG/0.5ML IM: CPT | Performed by: OBSTETRICS & GYNECOLOGY

## 2021-03-23 ENCOUNTER — IMMUNIZATION (OUTPATIENT)
Dept: VACCINE CLINIC | Facility: HOSPITAL | Age: 81
End: 2021-03-23

## 2021-03-23 PROCEDURE — 0012A: CPT | Performed by: OBSTETRICS & GYNECOLOGY

## 2021-03-23 PROCEDURE — 91301 HC SARSCO02 VAC 100MCG/0.5ML IM: CPT | Performed by: OBSTETRICS & GYNECOLOGY

## 2023-12-10 DIAGNOSIS — U07.1 COVID-19 VIRUS INFECTION: Primary | ICD-10-CM

## 2023-12-10 RX ORDER — NIRMATRELVIR AND RITONAVIR 150-100 MG
2 KIT ORAL 2 TIMES DAILY
Qty: 1 EACH | Refills: 0 | Status: SHIPPED | OUTPATIENT
Start: 2023-12-10